# Patient Record
Sex: FEMALE | Race: BLACK OR AFRICAN AMERICAN | NOT HISPANIC OR LATINO | Employment: OTHER | URBAN - NONMETROPOLITAN AREA
[De-identification: names, ages, dates, MRNs, and addresses within clinical notes are randomized per-mention and may not be internally consistent; named-entity substitution may affect disease eponyms.]

---

## 2023-05-05 ENCOUNTER — APPOINTMENT (OUTPATIENT)
Dept: GENERAL RADIOLOGY | Facility: HOSPITAL | Age: 73
End: 2023-05-05
Payer: MEDICARE

## 2023-05-05 ENCOUNTER — HOSPITAL ENCOUNTER (INPATIENT)
Facility: HOSPITAL | Age: 73
LOS: 11 days | Discharge: NURSING, REHAB OR LONG-TERM CARE | End: 2023-05-16
Attending: INTERNAL MEDICINE | Admitting: INTERNAL MEDICINE
Payer: MEDICARE

## 2023-05-05 DIAGNOSIS — Z74.09 IMPAIRED MOBILITY: ICD-10-CM

## 2023-05-05 DIAGNOSIS — R57.1 HYPOVOLEMIC SHOCK: ICD-10-CM

## 2023-05-05 DIAGNOSIS — D62 ACUTE BLOOD LOSS ANEMIA: Primary | ICD-10-CM

## 2023-05-05 DIAGNOSIS — Z78.9 IMPAIRED MOBILITY AND ADLS: ICD-10-CM

## 2023-05-05 DIAGNOSIS — Z74.09 IMPAIRED MOBILITY AND ADLS: ICD-10-CM

## 2023-05-05 DIAGNOSIS — K25.0 ACUTE GASTRIC ULCER WITH HEMORRHAGE: ICD-10-CM

## 2023-05-05 DIAGNOSIS — R13.11 ORAL PHASE DYSPHAGIA: ICD-10-CM

## 2023-05-05 PROBLEM — R09.02 HYPOXIA: Status: ACTIVE | Noted: 2023-05-05

## 2023-05-05 PROBLEM — J18.9 RIGHT LOWER LOBE PNEUMONIA: Status: ACTIVE | Noted: 2023-05-05

## 2023-05-05 PROBLEM — G93.41 METABOLIC ENCEPHALOPATHY: Status: ACTIVE | Noted: 2023-05-05

## 2023-05-05 PROBLEM — R74.01 TRANSAMINITIS: Status: ACTIVE | Noted: 2023-05-05

## 2023-05-05 PROBLEM — D64.9 ANEMIA: Status: ACTIVE | Noted: 2023-05-05

## 2023-05-05 PROBLEM — R65.21 SEPTIC SHOCK: Status: ACTIVE | Noted: 2023-05-05

## 2023-05-05 PROBLEM — F79 INTELLECTUAL DISABILITY: Status: ACTIVE | Noted: 2023-05-05

## 2023-05-05 PROBLEM — D69.6 THROMBOCYTOPENIA: Status: ACTIVE | Noted: 2023-05-05

## 2023-05-05 PROBLEM — A41.9 SEPTIC SHOCK: Status: ACTIVE | Noted: 2023-05-05

## 2023-05-05 LAB
ALBUMIN SERPL-MCNC: 2.1 G/DL (ref 3.5–5.2)
ALBUMIN/GLOB SERPL: 1.2 G/DL
ALP SERPL-CCNC: 53 U/L (ref 39–117)
ALT SERPL W P-5'-P-CCNC: 54 U/L (ref 1–33)
ANION GAP SERPL CALCULATED.3IONS-SCNC: 5 MMOL/L (ref 5–15)
ARTERIAL PATENCY WRIST A: POSITIVE
AST SERPL-CCNC: 39 U/L (ref 1–32)
ATMOSPHERIC PRESS: 751 MMHG
BASE EXCESS BLDA CALC-SCNC: -0.9 MMOL/L (ref 0–2)
BASOPHILS # BLD MANUAL: 0.11 10*3/MM3 (ref 0–0.2)
BASOPHILS NFR BLD MANUAL: 1.6 % (ref 0–1.5)
BDY SITE: ABNORMAL
BILIRUB SERPL-MCNC: 0.3 MG/DL (ref 0–1.2)
BODY TEMPERATURE: 37 C
BUN SERPL-MCNC: 56 MG/DL (ref 8–23)
BUN/CREAT SERPL: 67.5 (ref 7–25)
CALCIUM SPEC-SCNC: 7.8 MG/DL (ref 8.6–10.5)
CHLORIDE SERPL-SCNC: 114 MMOL/L (ref 98–107)
CO2 SERPL-SCNC: 25 MMOL/L (ref 22–29)
CREAT SERPL-MCNC: 0.83 MG/DL (ref 0.57–1)
D-LACTATE SERPL-SCNC: 1.8 MMOL/L (ref 0.5–2)
DEPRECATED RDW RBC AUTO: 45 FL (ref 37–54)
EGFRCR SERPLBLD CKD-EPI 2021: 74.5 ML/MIN/1.73
ERYTHROCYTE [DISTWIDTH] IN BLOOD BY AUTOMATED COUNT: 15 % (ref 12.3–15.4)
GAS FLOW AIRWAY: 2 LPM
GIANT PLATELETS: ABNORMAL
GLOBULIN UR ELPH-MCNC: 1.8 GM/DL
GLUCOSE SERPL-MCNC: 145 MG/DL (ref 65–99)
HCO3 BLDA-SCNC: 23.3 MMOL/L (ref 20–26)
HCT VFR BLD AUTO: 19.4 % (ref 34–46.6)
HGB BLD-MCNC: 6.7 G/DL (ref 12–15.9)
L PNEUMO1 AG UR QL IA: NEGATIVE
LYMPHOCYTES # BLD MANUAL: 1.65 10*3/MM3 (ref 0.7–3.1)
LYMPHOCYTES NFR BLD MANUAL: 2.4 % (ref 5–12)
Lab: ABNORMAL
MAGNESIUM SERPL-MCNC: 1.7 MG/DL (ref 1.6–2.4)
MCH RBC QN AUTO: 28.9 PG (ref 26.6–33)
MCHC RBC AUTO-ENTMCNC: 34.5 G/DL (ref 31.5–35.7)
MCV RBC AUTO: 83.6 FL (ref 79–97)
MODALITY: ABNORMAL
MONOCYTES # BLD: 0.17 10*3/MM3 (ref 0.1–0.9)
MRSA DNA SPEC QL NAA+PROBE: NORMAL
NEUTROPHILS # BLD AUTO: 5.11 10*3/MM3 (ref 1.7–7)
NEUTROPHILS NFR BLD MANUAL: 72.6 % (ref 42.7–76)
NRBC SPEC MANUAL: 7.3 /100 WBC (ref 0–0.2)
PCO2 BLDA: 35.1 MM HG (ref 35–45)
PCO2 TEMP ADJ BLD: 35.1 MM HG (ref 35–45)
PH BLDA: 7.43 PH UNITS (ref 7.35–7.45)
PH, TEMP CORRECTED: 7.43 PH UNITS (ref 7.35–7.45)
PHOSPHATE SERPL-MCNC: 2.1 MG/DL (ref 2.5–4.5)
PLATELET # BLD AUTO: 80 10*3/MM3 (ref 140–450)
PMV BLD AUTO: 12.4 FL (ref 6–12)
PO2 BLDA: 92.1 MM HG (ref 83–108)
PO2 TEMP ADJ BLD: 92.1 MM HG (ref 83–108)
POIKILOCYTOSIS BLD QL SMEAR: ABNORMAL
POTASSIUM SERPL-SCNC: 3.9 MMOL/L (ref 3.5–5.2)
PROCALCITONIN SERPL-MCNC: 0.27 NG/ML (ref 0–0.25)
PROT SERPL-MCNC: 3.9 G/DL (ref 6–8.5)
RBC # BLD AUTO: 2.32 10*6/MM3 (ref 3.77–5.28)
S PNEUM AG SPEC QL LA: NEGATIVE
SAO2 % BLDCOA: 98.4 % (ref 94–99)
SMALL PLATELETS BLD QL SMEAR: ABNORMAL
SODIUM SERPL-SCNC: 144 MMOL/L (ref 136–145)
TARGETS BLD QL SMEAR: ABNORMAL
VARIANT LYMPHS NFR BLD MANUAL: 23.4 % (ref 19.6–45.3)
VENTILATOR MODE: ABNORMAL
WBC MORPH BLD: NORMAL
WBC NRBC COR # BLD: 7.04 10*3/MM3 (ref 3.4–10.8)

## 2023-05-05 PROCEDURE — 86901 BLOOD TYPING SEROLOGIC RH(D): CPT | Performed by: INTERNAL MEDICINE

## 2023-05-05 PROCEDURE — P9016 RBC LEUKOCYTES REDUCED: HCPCS

## 2023-05-05 PROCEDURE — 86900 BLOOD TYPING SEROLOGIC ABO: CPT | Performed by: INTERNAL MEDICINE

## 2023-05-05 PROCEDURE — 85025 COMPLETE CBC W/AUTO DIFF WBC: CPT | Performed by: INTERNAL MEDICINE

## 2023-05-05 PROCEDURE — 86850 RBC ANTIBODY SCREEN: CPT | Performed by: INTERNAL MEDICINE

## 2023-05-05 PROCEDURE — 25010000002 PIPERACILLIN SOD-TAZOBACTAM PER 1 G: Performed by: INTERNAL MEDICINE

## 2023-05-05 PROCEDURE — 36430 TRANSFUSION BLD/BLD COMPNT: CPT

## 2023-05-05 PROCEDURE — 82803 BLOOD GASES ANY COMBINATION: CPT

## 2023-05-05 PROCEDURE — 83735 ASSAY OF MAGNESIUM: CPT | Performed by: INTERNAL MEDICINE

## 2023-05-05 PROCEDURE — 87899 AGENT NOS ASSAY W/OPTIC: CPT | Performed by: INTERNAL MEDICINE

## 2023-05-05 PROCEDURE — 85007 BL SMEAR W/DIFF WBC COUNT: CPT | Performed by: INTERNAL MEDICINE

## 2023-05-05 PROCEDURE — 36600 WITHDRAWAL OF ARTERIAL BLOOD: CPT

## 2023-05-05 PROCEDURE — 94799 UNLISTED PULMONARY SVC/PX: CPT

## 2023-05-05 PROCEDURE — 93010 ELECTROCARDIOGRAM REPORT: CPT | Performed by: EMERGENCY MEDICINE

## 2023-05-05 PROCEDURE — 87641 MR-STAPH DNA AMP PROBE: CPT | Performed by: INTERNAL MEDICINE

## 2023-05-05 PROCEDURE — 86900 BLOOD TYPING SEROLOGIC ABO: CPT

## 2023-05-05 PROCEDURE — 80053 COMPREHEN METABOLIC PANEL: CPT | Performed by: INTERNAL MEDICINE

## 2023-05-05 PROCEDURE — 84145 PROCALCITONIN (PCT): CPT | Performed by: INTERNAL MEDICINE

## 2023-05-05 PROCEDURE — 87040 BLOOD CULTURE FOR BACTERIA: CPT | Performed by: INTERNAL MEDICINE

## 2023-05-05 PROCEDURE — 93005 ELECTROCARDIOGRAM TRACING: CPT | Performed by: INTERNAL MEDICINE

## 2023-05-05 PROCEDURE — 94761 N-INVAS EAR/PLS OXIMETRY MLT: CPT

## 2023-05-05 PROCEDURE — 87449 NOS EACH ORGANISM AG IA: CPT | Performed by: INTERNAL MEDICINE

## 2023-05-05 PROCEDURE — 86920 COMPATIBILITY TEST SPIN: CPT

## 2023-05-05 PROCEDURE — 84100 ASSAY OF PHOSPHORUS: CPT | Performed by: INTERNAL MEDICINE

## 2023-05-05 PROCEDURE — 94640 AIRWAY INHALATION TREATMENT: CPT

## 2023-05-05 PROCEDURE — 94664 DEMO&/EVAL PT USE INHALER: CPT

## 2023-05-05 PROCEDURE — 86901 BLOOD TYPING SEROLOGIC RH(D): CPT

## 2023-05-05 PROCEDURE — 83605 ASSAY OF LACTIC ACID: CPT | Performed by: INTERNAL MEDICINE

## 2023-05-05 PROCEDURE — 71045 X-RAY EXAM CHEST 1 VIEW: CPT

## 2023-05-05 PROCEDURE — 25010000002 VANCOMYCIN 10 G RECONSTITUTED SOLUTION: Performed by: INTERNAL MEDICINE

## 2023-05-05 RX ORDER — IPRATROPIUM BROMIDE AND ALBUTEROL SULFATE 2.5; .5 MG/3ML; MG/3ML
3 SOLUTION RESPIRATORY (INHALATION)
Status: DISCONTINUED | OUTPATIENT
Start: 2023-05-05 | End: 2023-05-06

## 2023-05-05 RX ORDER — ACETAMINOPHEN 325 MG/1
650 TABLET ORAL EVERY 4 HOURS PRN
Status: DISCONTINUED | OUTPATIENT
Start: 2023-05-05 | End: 2023-05-16 | Stop reason: HOSPADM

## 2023-05-05 RX ORDER — SODIUM CHLORIDE 0.9 % (FLUSH) 0.9 %
10 SYRINGE (ML) INJECTION EVERY 12 HOURS SCHEDULED
Status: DISCONTINUED | OUTPATIENT
Start: 2023-05-05 | End: 2023-05-16 | Stop reason: HOSPADM

## 2023-05-05 RX ORDER — SODIUM CHLORIDE 9 MG/ML
40 INJECTION, SOLUTION INTRAVENOUS AS NEEDED
Status: DISCONTINUED | OUTPATIENT
Start: 2023-05-05 | End: 2023-05-16 | Stop reason: HOSPADM

## 2023-05-05 RX ORDER — ACETAMINOPHEN 650 MG/1
650 SUPPOSITORY RECTAL EVERY 4 HOURS PRN
Status: DISCONTINUED | OUTPATIENT
Start: 2023-05-05 | End: 2023-05-16 | Stop reason: HOSPADM

## 2023-05-05 RX ORDER — ONDANSETRON 2 MG/ML
4 INJECTION INTRAMUSCULAR; INTRAVENOUS EVERY 6 HOURS PRN
Status: DISCONTINUED | OUTPATIENT
Start: 2023-05-05 | End: 2023-05-16 | Stop reason: HOSPADM

## 2023-05-05 RX ORDER — SODIUM CHLORIDE 9 MG/ML
100 INJECTION, SOLUTION INTRAVENOUS CONTINUOUS
Status: DISCONTINUED | OUTPATIENT
Start: 2023-05-05 | End: 2023-05-06

## 2023-05-05 RX ORDER — NOREPINEPHRINE BITARTRATE 0.03 MG/ML
.02-.3 INJECTION, SOLUTION INTRAVENOUS
Status: DISCONTINUED | OUTPATIENT
Start: 2023-05-05 | End: 2023-05-07

## 2023-05-05 RX ORDER — SODIUM CHLORIDE 0.9 % (FLUSH) 0.9 %
10 SYRINGE (ML) INJECTION AS NEEDED
Status: DISCONTINUED | OUTPATIENT
Start: 2023-05-05 | End: 2023-05-16 | Stop reason: HOSPADM

## 2023-05-05 RX ORDER — ACETAMINOPHEN 160 MG/5ML
650 SOLUTION ORAL EVERY 4 HOURS PRN
Status: DISCONTINUED | OUTPATIENT
Start: 2023-05-05 | End: 2023-05-16 | Stop reason: HOSPADM

## 2023-05-05 RX ADMIN — VANCOMYCIN HYDROCHLORIDE 1250 MG: 10 INJECTION, POWDER, LYOPHILIZED, FOR SOLUTION INTRAVENOUS at 22:05

## 2023-05-05 RX ADMIN — SODIUM CHLORIDE, POTASSIUM CHLORIDE, SODIUM LACTATE AND CALCIUM CHLORIDE 1000 ML: 600; 310; 30; 20 INJECTION, SOLUTION INTRAVENOUS at 20:09

## 2023-05-05 RX ADMIN — TAZOBACTAM SODIUM AND PIPERACILLIN SODIUM 4.5 G: 500; 4 INJECTION, SOLUTION INTRAVENOUS at 21:38

## 2023-05-05 RX ADMIN — IPRATROPIUM BROMIDE AND ALBUTEROL SULFATE 3 ML: .5; 3 SOLUTION RESPIRATORY (INHALATION) at 20:39

## 2023-05-05 RX ADMIN — Medication 0.27 MCG/KG/MIN: at 20:10

## 2023-05-05 RX ADMIN — SODIUM CHLORIDE 100 ML/HR: 9 INJECTION, SOLUTION INTRAVENOUS at 20:09

## 2023-05-05 RX ADMIN — Medication 0.3 MCG/KG/MIN: at 18:31

## 2023-05-05 RX ADMIN — Medication 10 ML: at 20:10

## 2023-05-05 NOTE — H&P
[facility] Medicine Services  HISTORY AND PHYSICAL    Date of Admission: 5/5/2023  Primary Care Physician: David Frazier MD    Subjective   Primary Historian: Chart.    Chief Complaint: Transferred for hypotension.    History of Present Illness  This is a 72-year-old -American female patient who lives at a group home in [place], [place].  She is a snow of the [facility] in [place].  She has had a prior stroke and has a cognitive deficit according to the transferring physician.  Her baseline is unclear to me.  She arrives unable to provide any history.  She is fairly somnolent and does not speak.    She was admitted at the transferring facility on 5/3.  She comes from [facility] in [facility].  Her primary care doctor, Dr. Frazier, had been caring for her.  He states that when he admitted her she was hypothermic and had altered mental status.  He searched for signs of infection and found none.  He felt that she was dehydrated.  She was warmed and placed on fluids.  She has not improved very much.  She started requiring some oxygen earlier today after she had not been.  He repeated a chest x-ray and saw a right lower lobe pneumonia.  She had also been having blood pressures in the 80s so he placed a central line and started her on norepinephrine.  They do not have an intensive care unit so he requested that she come here.    Review of Systems   Unable to perform ROS: Mental status change      Past Medical History:   Past Medical History:   Diagnosis Date   • Hypertension    • Intellectual disability    • Psychosis    • Right hemiparesis    • Stroke       Past Surgical History:  Past Surgical History:   Procedure Laterality Date   • HIP SURGERY Right 2019     Social History: Alcohol use questions deferred to the physician. Drug use questions deferred to the physician.    Family History: Family history is unknown by patient.       Allergies:  No Known  Allergies    Medications:  It appears that her outpatient medications were acetaminophen, amlodipine, aspirin, atorvastatin, carvedilol, loratadine, Risperdal, Dyazide, calcium and vitamin D, PreserVision, and Senokot.    In the hospital she had been receiving albuterol, pantoprazole, Lovenox, acetaminophen, and vancomycin/Zosyn.    I have utilized all available immediate resources to obtain, update, or review the patient's current medications (including all prescriptions, over-the-counter products, herbals, cannabis/cannabidiol products, and vitamin/mineral/dietary (nutritional) supplements).    Objective     Vital Signs: BP 99/68   Pulse 88   Temp 98.5 °F (36.9 °C) (Axillary)   Resp 12   Wt 57 kg (125 lb 10.6 oz)   SpO2 93%   Physical Exam  Constitutional:       Comments: In bed.   HENT:      Head: Normocephalic and atraumatic.      Mouth/Throat:      Mouth: Mucous membranes are dry.   Eyes:      Conjunctiva/sclera: Conjunctivae normal.      Pupils: Pupils are equal, round, and reactive to light.   Neck:      Vascular: No JVD.      Comments: Right IJ CVC placed at the transferring facility.  We will get a chest x-ray to confirm placement.  Cardiovascular:      Rate and Rhythm: Normal rate and regular rhythm.      Heart sounds: Normal heart sounds.   Pulmonary:      Breath sounds: Rhonchi and rales present. No wheezing.      Comments: On 3 L of oxygen.  Slightly tachypneic at times.  Chest:      Chest wall: No tenderness.   Abdominal:      General: Bowel sounds are normal. There is no distension.      Palpations: Abdomen is soft.      Tenderness: There is no abdominal tenderness.   Musculoskeletal:         General: No tenderness or deformity. Normal range of motion.      Cervical back: Neck supple.   Skin:     General: Skin is warm and dry.      Findings: No rash.   Neurological:      Mental Status: She is disoriented.      Motor: Weakness present. No abnormal muscle tone.      Comments: Seems to be globally  weak at present, but also record stated she has more chronic right hemiparesis after her stroke.  She responded to painful stimuli in the lower extremities, but not well in the upper extremities.   Psychiatric:      Comments: Fairly somnolent.        Results Reviewed from [facility]:  1.  ABG on 5/3 showed a pH of 7.316, PCO2 65, PaO2 555.  Sat of 100% on room air.  2.  Chemistries from 5/5 show a sodium of 147, potassium 4.1.  Chloride 115 and bicarb 29.  BUN and creatinine 51 and 0.96 respectively.  Calcium 7.9 and glucose 126.  Hepatic function panel shows an AST of 54, ALT 85.  Alkaline phosphatase 62 with a total bilirubin of 0.27.  Total protein 4.0 and albumin 1.4.  3.  Magnesium on 5/3 was 2.1.  4.  BNP slightly elevated today at 322.  5.  High-sensitivity troponin on 5/3 was normal at 15 and was repeated today at 52.  Upper limit of normal is 51.  6.  CBC from today shows white blood cell count of 4.8.  H&H 8.3 and 23.9 respectively.  Platelets 86,000.  7.  COVID-19 testing negative.  8.  Urinalysis showed 2+ bacteria with no pyuria and negative nitrites with trace leukocyte esterase.  9.  EKG from today at 1117 showed sinus rhythm without ST or T wave changes suggestive of ischemia.  10.  She had a recent CT of the head without contrast and a chest x-ray.  The transferring physician stated that her initial chest x-ray failed to show any problems.  He told me on the transfer line that the chest x-ray from today showed a developing right lower lobe pneumonia.  Recent CT of the head was apparently unremarkable.  No reads were sent.      I have personally reviewed and interpreted the radiology studies and ECG obtained at time of admission.     Assessment / Plan   Assessment:   Active Hospital Problems    Diagnosis    • **Septic shock    • Right lower lobe pneumonia    • Hypoxia    • Metabolic encephalopathy    • Anemia    • Thrombocytopenia    • Intellectual disability    • Transaminitis       Treatment Plan  The patient will be admitted to my service here at [facility].  She is transferred here from [facility] in [facility].  Her primary care provider had her in the hospital there for the last 2 days.  She presented with hypothermia and altered mental status.  He thought at first she was just dehydrated and then she ultimately developed a right lower lobe pneumonia and some hypoxemia after initially having clear chest imaging and a normal urinalysis.  She became hypotensive.  A central line was placed and she was started on norepinephrine and sent here.    Check lactate and procalcitonin.  MRSA nasal screen.  Streptococcal/Legionella urinary antigens. Highly unlikely to produce sputum.    Dose vancomycin and Zosyn.    Make efforts at pulmonary toilet.  DuoNeb.    Bolus fluids and check CVP.  Try to wean off norepinephrine.    Speech therapy to evaluate her if she becomes more awake.    She may be chronically thrombocytopenic and anemic.  Unclear if she is.  Also has a transaminitis.  These issues may need to be looked into further soon.    SCDs for DVT prophylaxis.    Medical Decision Making  Number and Complexity of problems: 1 acute, highly complex problem in the form of her septic shock caused by a presumed right lower lobe pneumonia that was checked out by the transferring provider.  Differential Diagnosis: CHF.    Conditions and Status        Condition is worsening.     [facility] Data  External documents reviewed: Reviewed records from [facility].   Cardiac tracing (EKG, telemetry) interpretation: NSR.   Radiology interpretation: Was told by the transferring facility that she had a right lower lobe pneumonia.  I was told the CT of the head was unremarkable.  Labs reviewed: As above.  Any tests that were considered but not ordered: CT of the chest.     Decision rules/scores evaluated (example [id number], Wells, etc): None considered at present.     Discussed with: The  transferring physician as above.  Discussed with her nurse (Chantel) here.     Care Planning  Shared decision making: Unable to consent to anything.  Her transfer was approved by her state guardian.  Code status and discussions: Full with full interventions according to her state guardian.  Her primary care provider discussed her case with them prior to transfer.    Disposition  Social Determinants of Health that impact treatment or disposition: Lives in a group home with an intellectual disability.  [place] of the [facility].  Estimated length of stay is 3-5 days.     I confirmed that the patient's advanced care plan is present, code status is documented, and a surrogate decision maker is listed in the patient's medical record.     The patient's surrogate decision maker is her state guardian in [place].     The patient was seen and examined by me on 05/05/23 at 1745.    Electronically signed by David Laird DO, 05/05/23, 19:13 CDT.    I spent 35 minutes providing critical care management to this patient. This excludes time spent in performing separately billed procedures.

## 2023-05-06 ENCOUNTER — ANESTHESIA EVENT (OUTPATIENT)
Dept: PERIOP | Facility: HOSPITAL | Age: 73
End: 2023-05-06
Payer: MEDICARE

## 2023-05-06 ENCOUNTER — ANESTHESIA (OUTPATIENT)
Dept: PERIOP | Facility: HOSPITAL | Age: 73
End: 2023-05-06
Payer: MEDICARE

## 2023-05-06 LAB
ABO GROUP BLD: NORMAL
ANION GAP SERPL CALCULATED.3IONS-SCNC: 8 MMOL/L (ref 5–15)
APTT PPP: 39 SECONDS (ref 24.1–35)
BACTERIA UR QL AUTO: ABNORMAL /HPF
BILIRUB UR QL STRIP: NEGATIVE
BLD GP AB SCN SERPL QL: NEGATIVE
BUN SERPL-MCNC: 52 MG/DL (ref 8–23)
BUN/CREAT SERPL: 83.9 (ref 7–25)
CALCIUM SPEC-SCNC: 7.5 MG/DL (ref 8.6–10.5)
CHLORIDE SERPL-SCNC: 120 MMOL/L (ref 98–107)
CLARITY UR: CLEAR
CO2 SERPL-SCNC: 19 MMOL/L (ref 22–29)
COLOR UR: YELLOW
CREAT SERPL-MCNC: 0.62 MG/DL (ref 0.57–1)
DEPRECATED RDW RBC AUTO: 43.7 FL (ref 37–54)
EGFRCR SERPLBLD CKD-EPI 2021: 94.2 ML/MIN/1.73
ERYTHROCYTE [DISTWIDTH] IN BLOOD BY AUTOMATED COUNT: 13.9 % (ref 12.3–15.4)
GLUCOSE BLDC GLUCOMTR-MCNC: 139 MG/DL (ref 70–130)
GLUCOSE SERPL-MCNC: 156 MG/DL (ref 65–99)
GLUCOSE UR STRIP-MCNC: NEGATIVE MG/DL
HCT VFR BLD AUTO: 23.4 % (ref 34–46.6)
HCT VFR BLD AUTO: 27.1 % (ref 34–46.6)
HCT VFR BLD AUTO: 27.6 % (ref 34–46.6)
HCT VFR BLD AUTO: 30.4 % (ref 34–46.6)
HGB BLD-MCNC: 10 G/DL (ref 12–15.9)
HGB BLD-MCNC: 7.8 G/DL (ref 12–15.9)
HGB BLD-MCNC: 9.2 G/DL (ref 12–15.9)
HGB BLD-MCNC: 9.3 G/DL (ref 12–15.9)
HGB UR QL STRIP.AUTO: ABNORMAL
HYALINE CASTS UR QL AUTO: ABNORMAL /LPF
INR PPP: 1.38 (ref 0.91–1.09)
KETONES UR QL STRIP: NEGATIVE
LEUKOCYTE ESTERASE UR QL STRIP.AUTO: NEGATIVE
MCH RBC QN AUTO: 28.5 PG (ref 26.6–33)
MCHC RBC AUTO-ENTMCNC: 32.9 G/DL (ref 31.5–35.7)
MCV RBC AUTO: 86.6 FL (ref 79–97)
NITRITE UR QL STRIP: NEGATIVE
PH UR STRIP.AUTO: <=5 [PH] (ref 5–8)
PLATELET # BLD AUTO: 59 10*3/MM3 (ref 140–450)
PMV BLD AUTO: 13.4 FL (ref 6–12)
POTASSIUM SERPL-SCNC: 3.6 MMOL/L (ref 3.5–5.2)
PROT UR QL STRIP: NEGATIVE
PROTHROMBIN TIME: 17.2 SECONDS (ref 11.8–14.8)
RBC # BLD AUTO: 3.51 10*6/MM3 (ref 3.77–5.28)
RBC # UR STRIP: ABNORMAL /HPF
REF LAB TEST METHOD: ABNORMAL
RH BLD: POSITIVE
SODIUM SERPL-SCNC: 147 MMOL/L (ref 136–145)
SP GR UR STRIP: 1.02 (ref 1–1.03)
SQUAMOUS #/AREA URNS HPF: ABNORMAL /HPF
T&S EXPIRATION DATE: NORMAL
UROBILINOGEN UR QL STRIP: ABNORMAL
WBC # UR STRIP: ABNORMAL /HPF
WBC NRBC COR # BLD: 8.66 10*3/MM3 (ref 3.4–10.8)

## 2023-05-06 PROCEDURE — 36430 TRANSFUSION BLD/BLD COMPNT: CPT

## 2023-05-06 PROCEDURE — 94799 UNLISTED PULMONARY SVC/PX: CPT

## 2023-05-06 PROCEDURE — 43255 EGD CONTROL BLEEDING ANY: CPT | Performed by: INTERNAL MEDICINE

## 2023-05-06 PROCEDURE — 25010000002 ONDANSETRON PER 1 MG: Performed by: NURSE ANESTHETIST, CERTIFIED REGISTERED

## 2023-05-06 PROCEDURE — P9100 PATHOGEN TEST FOR PLATELETS: HCPCS

## 2023-05-06 PROCEDURE — P9037 PLATE PHERES LEUKOREDU IRRAD: HCPCS

## 2023-05-06 PROCEDURE — 0W3P8ZZ CONTROL BLEEDING IN GASTROINTESTINAL TRACT, VIA NATURAL OR ARTIFICIAL OPENING ENDOSCOPIC: ICD-10-PCS | Performed by: INTERNAL MEDICINE

## 2023-05-06 PROCEDURE — 25010000002 SUGAMMADEX 200 MG/2ML SOLUTION: Performed by: NURSE ANESTHETIST, CERTIFIED REGISTERED

## 2023-05-06 PROCEDURE — 85027 COMPLETE CBC AUTOMATED: CPT | Performed by: INTERNAL MEDICINE

## 2023-05-06 PROCEDURE — P9040 RBC LEUKOREDUCED IRRADIATED: HCPCS

## 2023-05-06 PROCEDURE — 25010000002 DESMOPRESSIN PER 1 MCG: Performed by: INTERNAL MEDICINE

## 2023-05-06 PROCEDURE — 85730 THROMBOPLASTIN TIME PARTIAL: CPT | Performed by: FAMILY MEDICINE

## 2023-05-06 PROCEDURE — 25010000002 PIPERACILLIN SOD-TAZOBACTAM PER 1 G: Performed by: INTERNAL MEDICINE

## 2023-05-06 PROCEDURE — 25010000002 EPINEPHRINE 1 MG/10ML SOLUTION PREFILLED SYRINGE: Performed by: INTERNAL MEDICINE

## 2023-05-06 PROCEDURE — 80048 BASIC METABOLIC PNL TOTAL CA: CPT | Performed by: INTERNAL MEDICINE

## 2023-05-06 PROCEDURE — 85018 HEMOGLOBIN: CPT | Performed by: INTERNAL MEDICINE

## 2023-05-06 PROCEDURE — 81001 URINALYSIS AUTO W/SCOPE: CPT | Performed by: FAMILY MEDICINE

## 2023-05-06 PROCEDURE — 82948 REAGENT STRIP/BLOOD GLUCOSE: CPT

## 2023-05-06 PROCEDURE — P9016 RBC LEUKOCYTES REDUCED: HCPCS

## 2023-05-06 PROCEDURE — 85018 HEMOGLOBIN: CPT | Performed by: FAMILY MEDICINE

## 2023-05-06 PROCEDURE — 85014 HEMATOCRIT: CPT | Performed by: FAMILY MEDICINE

## 2023-05-06 PROCEDURE — P9035 PLATELET PHERES LEUKOREDUCED: HCPCS

## 2023-05-06 PROCEDURE — 25010000002 PROPOFOL 10 MG/ML EMULSION: Performed by: NURSE ANESTHETIST, CERTIFIED REGISTERED

## 2023-05-06 PROCEDURE — 85610 PROTHROMBIN TIME: CPT | Performed by: FAMILY MEDICINE

## 2023-05-06 PROCEDURE — 86900 BLOOD TYPING SEROLOGIC ABO: CPT

## 2023-05-06 PROCEDURE — 85014 HEMATOCRIT: CPT | Performed by: INTERNAL MEDICINE

## 2023-05-06 DEVICE — DEV CLIP ENDO RESOLUTION360 CONTRL ROT 235CM: Type: IMPLANTABLE DEVICE | Site: STOMACH | Status: FUNCTIONAL

## 2023-05-06 DEVICE — DEV CLIP HEMO RESOLUTION360/ULTR 235CM 17MM STRL: Type: IMPLANTABLE DEVICE | Site: STOMACH | Status: FUNCTIONAL

## 2023-05-06 RX ORDER — TRAZODONE HYDROCHLORIDE 50 MG/1
25 TABLET ORAL NIGHTLY
COMMUNITY

## 2023-05-06 RX ORDER — ASPIRIN 81 MG/1
81 TABLET, CHEWABLE ORAL DAILY
COMMUNITY

## 2023-05-06 RX ORDER — IPRATROPIUM BROMIDE AND ALBUTEROL SULFATE 2.5; .5 MG/3ML; MG/3ML
3 SOLUTION RESPIRATORY (INHALATION) EVERY 6 HOURS PRN
Status: DISCONTINUED | OUTPATIENT
Start: 2023-05-06 | End: 2023-05-16 | Stop reason: HOSPADM

## 2023-05-06 RX ORDER — ATORVASTATIN CALCIUM 10 MG/1
10 TABLET, FILM COATED ORAL NIGHTLY
COMMUNITY

## 2023-05-06 RX ORDER — EPINEPHRINE IN SOD CHLOR,ISO 1 MG/10 ML
SYRINGE (ML) INTRAVENOUS AS NEEDED
Status: DISCONTINUED | OUTPATIENT
Start: 2023-05-06 | End: 2023-05-06 | Stop reason: HOSPADM

## 2023-05-06 RX ORDER — RISPERIDONE 0.5 MG/1
0.5 TABLET ORAL DAILY
COMMUNITY
End: 2023-05-16 | Stop reason: HOSPADM

## 2023-05-06 RX ORDER — LORATADINE 10 MG/1
10 CAPSULE, LIQUID FILLED ORAL DAILY PRN
COMMUNITY

## 2023-05-06 RX ORDER — PHENOL 1.4 %
600 AEROSOL, SPRAY (ML) MUCOUS MEMBRANE 2 TIMES DAILY WITH MEALS
Status: ON HOLD | COMMUNITY
End: 2023-05-06

## 2023-05-06 RX ORDER — CARVEDILOL 12.5 MG/1
12.5 TABLET ORAL 2 TIMES DAILY WITH MEALS
COMMUNITY

## 2023-05-06 RX ORDER — PROPOFOL 10 MG/ML
VIAL (ML) INTRAVENOUS AS NEEDED
Status: DISCONTINUED | OUTPATIENT
Start: 2023-05-06 | End: 2023-05-06 | Stop reason: SURG

## 2023-05-06 RX ORDER — ROCURONIUM BROMIDE 10 MG/ML
INJECTION, SOLUTION INTRAVENOUS AS NEEDED
Status: DISCONTINUED | OUTPATIENT
Start: 2023-05-06 | End: 2023-05-06 | Stop reason: SURG

## 2023-05-06 RX ORDER — RISPERIDONE 1 MG/1
1 TABLET ORAL 2 TIMES DAILY
COMMUNITY

## 2023-05-06 RX ORDER — LIDOCAINE HYDROCHLORIDE 20 MG/ML
INJECTION, SOLUTION EPIDURAL; INFILTRATION; INTRACAUDAL; PERINEURAL AS NEEDED
Status: DISCONTINUED | OUTPATIENT
Start: 2023-05-06 | End: 2023-05-06 | Stop reason: SURG

## 2023-05-06 RX ORDER — ANTIOX #8/OM3/DHA/EPA/LUT/ZEAX 250-2.5 MG
1 CAPSULE ORAL 2 TIMES DAILY
COMMUNITY

## 2023-05-06 RX ORDER — ONDANSETRON 2 MG/ML
INJECTION INTRAMUSCULAR; INTRAVENOUS AS NEEDED
Status: DISCONTINUED | OUTPATIENT
Start: 2023-05-06 | End: 2023-05-06 | Stop reason: SURG

## 2023-05-06 RX ORDER — TRIAMTERENE AND HYDROCHLOROTHIAZIDE 37.5; 25 MG/1; MG/1
1 CAPSULE ORAL EVERY MORNING
COMMUNITY
End: 2023-05-16 | Stop reason: HOSPADM

## 2023-05-06 RX ORDER — SODIUM CHLORIDE, SODIUM LACTATE, POTASSIUM CHLORIDE, CALCIUM CHLORIDE 600; 310; 30; 20 MG/100ML; MG/100ML; MG/100ML; MG/100ML
100 INJECTION, SOLUTION INTRAVENOUS CONTINUOUS
Status: DISCONTINUED | OUTPATIENT
Start: 2023-05-06 | End: 2023-05-07

## 2023-05-06 RX ORDER — ACETAMINOPHEN 325 MG/1
650 TABLET ORAL EVERY 6 HOURS PRN
COMMUNITY

## 2023-05-06 RX ORDER — ALBUTEROL SULFATE 2.5 MG/3ML
2.5 SOLUTION RESPIRATORY (INHALATION) EVERY 4 HOURS PRN
COMMUNITY

## 2023-05-06 RX ORDER — BUPIVACAINE HCL/0.9 % NACL/PF 0.125 %
PLASTIC BAG, INJECTION (ML) EPIDURAL AS NEEDED
Status: DISCONTINUED | OUTPATIENT
Start: 2023-05-06 | End: 2023-05-06 | Stop reason: SURG

## 2023-05-06 RX ORDER — AMOXICILLIN 250 MG
2 CAPSULE ORAL NIGHTLY
COMMUNITY

## 2023-05-06 RX ORDER — AMLODIPINE BESYLATE 5 MG/1
5 TABLET ORAL DAILY
COMMUNITY

## 2023-05-06 RX ORDER — POTASSIUM CHLORIDE 20 MEQ/1
20 TABLET, EXTENDED RELEASE ORAL DAILY
COMMUNITY
End: 2023-05-16 | Stop reason: HOSPADM

## 2023-05-06 RX ADMIN — SUGAMMADEX 200 MG: 100 INJECTION, SOLUTION INTRAVENOUS at 14:22

## 2023-05-06 RX ADMIN — PANTOPRAZOLE SODIUM 8 MG/HR: 40 INJECTION, POWDER, FOR SOLUTION INTRAVENOUS at 19:00

## 2023-05-06 RX ADMIN — DESMOPRESSIN ACETATE 18 MCG: 4 SOLUTION INTRAVENOUS at 10:15

## 2023-05-06 RX ADMIN — PROPOFOL 80 MG: 10 INJECTION, EMULSION INTRAVENOUS at 13:57

## 2023-05-06 RX ADMIN — SODIUM CHLORIDE, POTASSIUM CHLORIDE, SODIUM LACTATE AND CALCIUM CHLORIDE 100 ML/HR: 600; 310; 30; 20 INJECTION, SOLUTION INTRAVENOUS at 09:30

## 2023-05-06 RX ADMIN — GLYCOPYRROLATE 0.2 MG: 0.2 INJECTION INTRAMUSCULAR; INTRAVENOUS at 14:01

## 2023-05-06 RX ADMIN — SODIUM CHLORIDE 100 ML/HR: 9 INJECTION, SOLUTION INTRAVENOUS at 03:23

## 2023-05-06 RX ADMIN — IPRATROPIUM BROMIDE AND ALBUTEROL SULFATE 3 ML: .5; 3 SOLUTION RESPIRATORY (INHALATION) at 07:38

## 2023-05-06 RX ADMIN — ONDANSETRON 4 MG: 2 INJECTION INTRAMUSCULAR; INTRAVENOUS at 14:15

## 2023-05-06 RX ADMIN — TAZOBACTAM SODIUM AND PIPERACILLIN SODIUM 4.5 G: 500; 4 INJECTION, SOLUTION INTRAVENOUS at 12:35

## 2023-05-06 RX ADMIN — ROCURONIUM BROMIDE 60 MG: 10 INJECTION, SOLUTION INTRAVENOUS at 13:57

## 2023-05-06 RX ADMIN — PANTOPRAZOLE SODIUM 8 MG/HR: 40 INJECTION, POWDER, FOR SOLUTION INTRAVENOUS at 23:13

## 2023-05-06 RX ADMIN — TAZOBACTAM SODIUM AND PIPERACILLIN SODIUM 4.5 G: 500; 4 INJECTION, SOLUTION INTRAVENOUS at 03:22

## 2023-05-06 RX ADMIN — Medication 0.23 MCG/KG/MIN: at 09:56

## 2023-05-06 RX ADMIN — Medication 100 MCG: at 13:57

## 2023-05-06 RX ADMIN — PANTOPRAZOLE SODIUM 8 MG/HR: 40 INJECTION, POWDER, FOR SOLUTION INTRAVENOUS at 09:30

## 2023-05-06 RX ADMIN — Medication 0.3 MCG/KG/MIN: at 01:03

## 2023-05-06 RX ADMIN — IPRATROPIUM BROMIDE AND ALBUTEROL SULFATE 3 ML: .5; 3 SOLUTION RESPIRATORY (INHALATION) at 10:41

## 2023-05-06 RX ADMIN — Medication 10 ML: at 09:30

## 2023-05-06 RX ADMIN — SODIUM CHLORIDE, POTASSIUM CHLORIDE, SODIUM LACTATE AND CALCIUM CHLORIDE 100 ML/HR: 600; 310; 30; 20 INJECTION, SOLUTION INTRAVENOUS at 17:45

## 2023-05-06 RX ADMIN — Medication 10 ML: at 20:10

## 2023-05-06 RX ADMIN — TAZOBACTAM SODIUM AND PIPERACILLIN SODIUM 4.5 G: 500; 4 INJECTION, SOLUTION INTRAVENOUS at 19:57

## 2023-05-06 RX ADMIN — LIDOCAINE HYDROCHLORIDE 100 MG: 20 INJECTION, SOLUTION EPIDURAL; INFILTRATION; INTRACAUDAL; PERINEURAL at 13:57

## 2023-05-06 NOTE — PROGRESS NOTES
[facility] Medicine Services  INPATIENT PROGRESS NOTE    Patient Name: Jess Ramsey  Date of Admission: 5/5/2023  Today's Date: 05/06/23  Length of Stay: 1  Primary Care Physician: David Frazier MD    Subjective   Chief Complaint: Altered mental status/pneumonia.    HPI   Patient is currently nonverbal, moaning and screaming.  Patient's is more alert than yesterday per nursing staff.  Blood pressure is improving, continue to wean off Levophed drip for now.  Ongoing treatment of pneumonia.    Review of Systems   Unable to obtain due to altered mental status.  All pertinent negatives and positives are as above. All other systems have been reviewed and are negative unless otherwise stated.     Objective    Temp:  [96.9 °F (36.1 °C)-98.5 °F (36.9 °C)] 97.9 °F (36.6 °C)  Heart Rate:  [76-99] 93  Resp:  [12-22] 20  BP: ()/() 91/63  Physical Exam  Vitals and nursing note reviewed.   Constitutional:       Comments: Chronically ill.   HENT:      Head: Normocephalic.   Eyes:      Conjunctiva/sclera: Conjunctivae normal.      Pupils: Pupils are equal, round, and reactive to light.   Neck:      Vascular: No JVD.   Cardiovascular:      Rate and Rhythm: Normal rate and regular rhythm.      Heart sounds: Normal heart sounds.   Pulmonary:      Effort: No respiratory distress.      Breath sounds: Rhonchi present. No wheezing or rales.      Comments: Patient is on 2 L of oxygen, rhonchi bilateral bases.  Chest:      Chest wall: No tenderness.   Abdominal:      General: Bowel sounds are normal. There is no distension.      Palpations: Abdomen is soft.      Tenderness: There is no abdominal tenderness.   Musculoskeletal:         General: No tenderness or deformity.      Cervical back: Neck supple.   Skin:     General: Skin is warm and dry.      Capillary Refill: Capillary refill takes 2 to 3 seconds.      Findings: No rash.   Neurological:      Cranial Nerves: No cranial nerve  deficit.      Motor: Weakness present. No abnormal muscle tone.      Coordination: Coordination abnormal.      Gait: Gait abnormal.      Deep Tendon Reflexes: Reflexes normal.             Results Review:  I have reviewed the labs, radiology results, and diagnostic studies.    Laboratory Data:   Results from last 7 days   Lab Units 05/06/23 0400 05/05/23 2000   WBC 10*3/mm3 8.66 7.04   HEMOGLOBIN g/dL 10.0* 6.7*   HEMATOCRIT % 30.4* 19.4*   PLATELETS 10*3/mm3 59* 80*        Results from last 7 days   Lab Units 05/06/23  0400 05/05/23 2000   SODIUM mmol/L 147* 144   POTASSIUM mmol/L 3.6 3.9   CHLORIDE mmol/L 120* 114*   CO2 mmol/L 19.0* 25.0   BUN mg/dL 52* 56*   CREATININE mg/dL 0.62 0.83   CALCIUM mg/dL 7.5* 7.8*   BILIRUBIN mg/dL  --  0.3   ALK PHOS U/L  --  53   ALT (SGPT) U/L  --  54*   AST (SGOT) U/L  --  39*   GLUCOSE mg/dL 156* 145*       Culture Data:   Blood Culture   Date Value Ref Range Status   05/05/2023 No growth at less than 24 hours  Preliminary       Radiology Data:   Imaging Results (Last 24 Hours)     Procedure Component Value Units Date/Time    XR Chest 1 View [[id number]] Collected: 05/05/23 1959     Updated: 05/05/23 2003    Narrative:      EXAM/TECHNIQUE: XR CHEST 1 VW-     INDICATION: Line placement, pneumonia.     COMPARISON: None     FINDINGS:     Right-sided CVL with tip overlying the cavoatrial junction. No visible  pneumothorax. Hazy veiling RIGHT basilar opacity. LEFT lung and pleural  space are clear. Cardiac silhouette is normal size. No acute osseous  finding.       Impression:         1.  Right-sided CVL with tip overlying the low SVC. No pneumothorax.  2.  Veiling RIGHT basilar opacity, likely representing small layering  pleural effusion and/or atelectasis.  This report was finalized on 05/05/2023 20:00 by Dr. Miguel Angel Grewal MD.          I have reviewed the patient's current medications.     Assessment/Plan   Assessment  Active Hospital Problems    Diagnosis    • **Septic shock     • Right lower lobe pneumonia    • Intellectual disability    • Anemia    • Thrombocytopenia    • Transaminitis    • Hypoxia    • Metabolic encephalopathy        Treatment Plan  HPI . Patient will be admitted at [facility].  She is transferred here from [facility] in [facility].  Her primary care provider had her in the hospital there for the last 2 days.  She presented with hypothermia and altered mental status.  He thought at first she was just dehydrated and then she ultimately developed a right lower lobe pneumonia and some hypoxemia after initially having clear chest imaging and a normal urinalysis.  She became hypotensive.  A central line was placed and she was started on norepinephrine and sent here.    Hypotension.  Wean down Levophed drip.  Normal saline.    GI bleed.  Black tarry stool noted.  GI consult.  Serial hemoglobin and stat hemoglobin now.  Protonix drip.    Anemia.  Status post 2 units of blood transfusion last night.  Hemoglobin 10 this morning.      Thrombocytopenia.  Platelet counts decreasing.  Platelet count today is 59.    Pneumonia/patient arrives somnolent/hypothermia/altered mental status . Zosyn.  Vancomycin.  Chest x-ray-  Right-sided CVL with tip overlying the low SVC,  No pneumothorax,  Veiling RIGHT basilar opacity, likely representing small layering pleural effusion and/or atelectasis.  Patient is currently on 2 L of oxygen    Buttock wound, stage II.  Wound care consult.    Hypernatremia.  Will follow    Dehydration . Ongoing IV hydration    History of stroke and cognitive deficit according to transfer physician.   Patient unable to provide any history.  Chronic right-sided weakness, nonverbal, patient does ambulate at baseline.    Patient from a group home in [facility].     Nutrition . Speech evaluate     Deconditioning . PT and OT consult    Blood culture with SUSHIL- pending.  Legionella antigen-negative.  MRSA screen-negative.  Strep  pneumo-negative.  UA ordered.    Patient transferred from [facility].   Patient is a [facility] of [facility].  [id number]-Tznirmu Virgil emergency contact, ulloa of [facility].    Patient lives in [facility] a [facility].  At baseline patient get around a wheelchair, unable to speak a spoon to feed himself, she can  food and needed, mostly had to be spoon fed.  Behavior wise baseline aggressive and yelling, this has been better with change in medications.    Palliative care consult on Monday    Medical Decision Making  Number and Complexity of problems: Hypotension/pneumonia/GI bleed/anemia/failure to thrive/buttock wound/hyponatremia  Differential Diagnosis: None.     Conditions and Status      Critical .     MDM Data  External documents reviewed: Previous note .  Cardiac tracing (EKG, telemetry) interpretation: Sinus .  Radiology interpretation: X-ray .  Labs reviewed: Laboratory  Any tests that were considered but not ordered: Lab now and in AM.     Decision rules/scores evaluated (example [id number], Wells, etc): None     Discussed with: Nursing and patient.     Care Planning  Shared decision making: Nursing and patient  Code status and discussions: Full code.  [place] of the state    Disposition  Social Determinants of Health that impact treatment or disposition: Patient is from a group home, [place] of the state.  3 to 6 days.    Electronically signed by Gallo Hazel MD, 05/06/23, 08:26 CDT.

## 2023-05-06 NOTE — OP NOTE
ESOPHAGOGASTRODUODENOSCOPY    Date:  5/6/2023    Indications:   1.  Acute blood loss anemia  2.  Melena       Procedure: ESOPHAGOGASTRODUODENOSCOPY/control of bleeding/submucosal tattoo injection    Sedation: As per anesthesia.    Surgeon: Sherif Villegas MD    Anesthesia: Monitored Anesthesia Care     Procedure  Description: After informed consent was obtained, a timeout was called in the endoscopy suite to confirm the correct patient and appropriate procedure.  Thereafter with the patient in the left lateral position, esophagus was intubated under direct vision with adult Olympus gastroscope.  Thereafter scope was slowly advanced into the second portion of the duodenum under direct vision.  Careful examination of the duodenum, stomach and esophagus was performed while slowly withdrawing the scope.  Retroflex examination of the gastric cardia and fundus were also performed.    Findings:   Esophagus: Normal esophageal body mucosa.  Normal Z-line without esophagitis endoscopically.  No Breanne-Wang tear.  No varices.    Stomach:  In the mid to proximal gastric body along the lesser curvature there were 2 subcentimeter fusiform ulcers with overlying eschar and dark adherent clot.  No bleeding to water pump provocation and no obvious significant pathology underlying.  2 large clips placed with 1 on each site with excellent apposition of tissue.  No bleeding.  Adjacent to this there was a large 1.5 cm well-organized fibrinous clot firmly adherent to a small ulcer base.  Approximately 16 mL of 1:10,000 epinephrine injected submucosally with excellent blanching effect in the periphery.  Despite using water pump provocation and a cold snare to gently trim the periphery of the clot, an insufficient amount was removed but not enough to visualize any underlying pathology such as a visible vessel.  The needle sheath was used to displace the clot laterally to visualize the underlying ulcer although again the clot was firmly in  place and no further attempts were made as not to cause any catastrophic bleeding.  2 large clips were placed immediately lateral to the clot at the apices of the small ulcer in an attempt to undermine any feeding vessels.  No resulting bleeding was noted.  No other interventions were performed.  4 mL spot tattoo ink was placed into the deep layers adjacent to the clot should surgical intervention be required in the future. No red blood or active bleeding noted.    Duodenum:  No obvious ulcerations were noted.  Gastric fluid mixed with thin old blood visualized.  No red blood or active bleeding noted.    Complications: No immediate complications.    Recommendations:   1.  Continue pantoprazole IV drip  2.  Serial H&H every 6 hours  3.  Strict n.p.o.  4.  Surgery evaluation in case patient rebleeds.  Should that occur, I would be happy to perform an EGD once again in the operating room in an attempt to control the bleeding endoscopically with surgical backup in case this was unsuccessful.  Interventional radiology will not be available.  5.  Consider relook EGD after 72 hours    Procedure CPT code: 55841    Sherif Villegas MD

## 2023-05-06 NOTE — ANESTHESIA POSTPROCEDURE EVALUATION
"Patient: Jess Ramsey    Procedure Summary     Date: 05/06/23 Room / Location:  [facility] OR 02 /  [facility] OR    Anesthesia Start: 1349 Anesthesia Stop: 1439    Procedure: ESOPHAGOGASTRODUODENOSCOPY WITH ANESTHESIA Diagnosis: (gi bleed)    Surgeons: Sherif Villegas MD Provider: Chandni Aguliar CRNA    Anesthesia Type: general ASA Status: 4 - Emergent          Anesthesia Type: general    Vitals  Vitals Value Taken Time   /105 05/06/23 1451   Temp 97.9 °F (36.6 °C) 05/06/23 1450   Pulse 87 05/06/23 1452   Resp 22 05/06/23 1450   SpO2 94 % 05/06/23 1452   Vitals shown include unvalidated device data.        Post Anesthesia Care and Evaluation    Patient location during evaluation: PACU  Patient participation: complete - patient participated  Level of consciousness: awake and alert  Pain management: adequate    Airway patency: patent  Anesthetic complications: No anesthetic complications    Cardiovascular status: acceptable  Respiratory status: acceptable  Hydration status: acceptable    Comments: Blood pressure 147/85, pulse 88, temperature 97.9 °F (36.6 °C), temperature source Temporal, resp. rate 22, height 154.9 cm (61\"), weight 60 kg (132 lb 4.8 oz), SpO2 96 %.    Pt discharged from PACU based on alma score >8  No anesthesia care post op    "

## 2023-05-06 NOTE — PLAN OF CARE
Patient is more alert this morning. will now open eyes to touch. non verbal at baseline. Afebrile overnight. Continues to be hypotensive. Given I liter bolus, 2 units of PRBC, and remains on a high dose of levophed. Noted HGB was 14.7 on 5/3 in paper chart and has been trending down. 6.7 on 5/5 PM lab work. Up to 10.0 this AM after two units of blood. No obvious signs of bleeding overnight. HR  sinus rhythm. UOP aprrox. 75mls/hr.   Problem: Skin Injury Risk Increased  Goal: Skin Health and Integrity  Outcome: Ongoing, Progressing  Intervention: Optimize Skin Protection  Recent Flowsheet Documentation  Taken 5/6/2023 0411 by Clemente Harris RN  Head of Bed (HOB) Positioning: HOB at 20-30 degrees  Taken 5/6/2023 0150 by Clemente Harris RN  Head of Bed (HOB) Positioning: HOB at 20 degrees  Taken 5/6/2023 0005 by Clemente Harris RN  Head of Bed (HOB) Positioning: HOB at 20 degrees  Taken 5/5/2023 2200 by Clemente Harris RN  Head of Bed (HOB) Positioning: HOB flat  Taken 5/5/2023 2000 by Clemente Harris RN  Head of Bed (HOB) Positioning: HOB flat     Problem: Fall Injury Risk  Goal: Absence of Fall and Fall-Related Injury  Outcome: Ongoing, Progressing  Intervention: Promote Injury-Free Environment  Recent Flowsheet Documentation  Taken 5/6/2023 0411 by Clemente Harris RN  Safety Promotion/Fall Prevention: safety round/check completed  Taken 5/6/2023 0300 by Clemente Harris RN  Safety Promotion/Fall Prevention: safety round/check completed  Taken 5/6/2023 0150 by Clemente Harris RN  Safety Promotion/Fall Prevention: safety round/check completed  Taken 5/6/2023 0100 by Clemente Harris RN  Safety Promotion/Fall Prevention: safety round/check completed  Taken 5/6/2023 0005 by Clemente Harris RN  Safety Promotion/Fall Prevention: safety round/check completed  Taken 5/5/2023 2300 by Clemente Harris RN  Safety Promotion/Fall Prevention: safety round/check completed  Taken 5/5/2023 2200 by Clemente Harris RN  Safety Promotion/Fall Prevention:  safety round/check completed  Taken 5/5/2023 2100 by Clemente Harris RN  Safety Promotion/Fall Prevention: safety round/check completed  Taken 5/5/2023 2000 by Clemente Harris RN  Safety Promotion/Fall Prevention: safety round/check completed  Taken 5/5/2023 1900 by Clemente Harris RN  Safety Promotion/Fall Prevention: safety round/check completed     Problem: Adult Inpatient Plan of Care  Goal: Plan of Care Review  Outcome: Ongoing, Progressing  Goal: Patient-Specific Goal (Individualized)  Outcome: Ongoing, Progressing  Goal: Absence of Hospital-Acquired Illness or Injury  Outcome: Ongoing, Progressing  Intervention: Identify and Manage Fall Risk  Recent Flowsheet Documentation  Taken 5/6/2023 0411 by Clemente Harris RN  Safety Promotion/Fall Prevention: safety round/check completed  Taken 5/6/2023 0300 by Clemente Harris RN  Safety Promotion/Fall Prevention: safety round/check completed  Taken 5/6/2023 0150 by Clemente Harris RN  Safety Promotion/Fall Prevention: safety round/check completed  Taken 5/6/2023 0100 by Clemente Harris RN  Safety Promotion/Fall Prevention: safety round/check completed  Taken 5/6/2023 0005 by Clemente Harris RN  Safety Promotion/Fall Prevention: safety round/check completed  Taken 5/5/2023 2300 by Clemente Harris RN  Safety Promotion/Fall Prevention: safety round/check completed  Taken 5/5/2023 2200 by Clemente Harris RN  Safety Promotion/Fall Prevention: safety round/check completed  Taken 5/5/2023 2100 by Clemente Harris RN  Safety Promotion/Fall Prevention: safety round/check completed  Taken 5/5/2023 2000 by Clemente Harris RN  Safety Promotion/Fall Prevention: safety round/check completed  Taken 5/5/2023 1900 by Clemente Harris RN  Safety Promotion/Fall Prevention: safety round/check completed  Intervention: Prevent Skin Injury  Recent Flowsheet Documentation  Taken 5/6/2023 0411 by Clemente Harris RN  Body Position:   turned   right  Taken 5/6/2023 0150 by Clemente Harris RN  Body Position: supine  Taken  5/6/2023 0005 by Clemente Harris RN  Body Position:   turned   left  Taken 5/5/2023 2200 by Clemente Harris RN  Body Position:   turned   right  Taken 5/5/2023 2000 by Clemente Harris RN  Body Position:   turned   left  Intervention: Prevent and Manage VTE (Venous Thromboembolism) Risk  Recent Flowsheet Documentation  Taken 5/6/2023 0411 by Clemente Harris RN  VTE Prevention/Management: sequential compression devices on  Taken 5/6/2023 0005 by Clemente Harris RN  Activity Management: bedrest  VTE Prevention/Management: sequential compression devices on  Taken 5/5/2023 2000 by Clemente Harris RN  Activity Management: bedrest  VTE Prevention/Management: sequential compression devices on  Taken 5/5/2023 1910 by Clemente Harris RN  VTE Prevention/Management: sequential compression devices on  Goal: Optimal Comfort and Wellbeing  Outcome: Ongoing, Progressing  Goal: Readiness for Transition of Care  Outcome: Ongoing, Progressing  Intervention: Mutually Develop Transition Plan  Recent Flowsheet Documentation  Taken 5/6/2023 0220 by Clemente Harris RN  Transportation Anticipated: agency  Patient/Family Anticipated Services at Transition: none  Patient/Family Anticipates Transition to: (unknown at this time) other (see comments)  Taken 5/6/2023 0217 by Clemente Harris RN  Equipment Currently Used at Home: none     Problem: Fluid Imbalance (Pneumonia)  Goal: Fluid Balance  Outcome: Ongoing, Progressing     Problem: Infection (Pneumonia)  Goal: Resolution of Infection Signs and Symptoms  Outcome: Ongoing, Progressing     Problem: Respiratory Compromise (Pneumonia)  Goal: Effective Oxygenation and Ventilation  Outcome: Ongoing, Progressing  Intervention: Promote Airway Secretion Clearance  Recent Flowsheet Documentation  Taken 5/6/2023 0005 by Clemente Harris RN  Cough And Deep Breathing: unable to perform  Taken 5/5/2023 2000 by Clemente Harris RN  Cough And Deep Breathing: unable to perform  Intervention: Optimize Oxygenation and  Ventilation  Recent Flowsheet Documentation  Taken 5/6/2023 0411 by Clemente Harris RN  Head of Bed (HOB) Positioning: HOB at 20-30 degrees  Taken 5/6/2023 0150 by Clemente Harris RN  Head of Bed (HOB) Positioning: HOB at 20 degrees  Taken 5/6/2023 0005 by Clemente Harris RN  Head of Bed (HOB) Positioning: HOB at 20 degrees  Taken 5/5/2023 2200 by Clemente Harris RN  Head of Bed (HOB) Positioning: HOB flat  Taken 5/5/2023 2000 by Clemente Harris RN  Head of Bed (HOB) Positioning: HOB flat   Goal Outcome Evaluation:

## 2023-05-06 NOTE — CONSULTS
"Pharmacy Dosing Service  Pharmacokinetics  Vancomycin Initial Evaluation  Assessment/Action/Plan:  Loading dose?: 1250mg  Current Order: Vancomycin 500 mg IVPB every 12 hours  Current end date:5/10  Levels: not ordered  Additional antimicrobial agent(s): Zosyn    Vancomycin dosage initiated based on population pharmacokinetic parameters. Pharmacy will continue to follow daily and adjust dose accordingly.     Subjective:  Jess Ramsey is a 72 y.o. female with a Vancomycin \"Pharmacy to Dose\" consult for the treatment of sepsis,PNA , day 1 of 5 of treatment.    AUC Model Data:  Loading dose: 1250mg  Regimen: 500 mg IV every 12 hours.  Start time: 21:13 on 05/05/2023  Exposure target: AUC24 (range)400-600 mg/L.hr   AUC24,ss: 389 mg/L.hr  PAUC*: 47 %  Ctrough,ss: 12.9 mg/L  Pconc*: 10 %  Tox.: 8 %      Objective:  Ht: 154.9 cm (61\"); Wt: 57 kg (125 lb 11.2 oz)  Estimated Creatinine Clearance: 54.3 mL/min (by C-G formula based on SCr of 0.83 mg/dL).   Creatinine   Date Value Ref Range Status   05/05/2023 0.83 0.57 - 1.00 mg/dL Final   01/06/2021 0.6 0.5 - 1.1 mg/dL Final   11/02/2020 0.6 0.5 - 1.1 mg/dL Final   10/23/2019 0.9 0.5 - 1.1 mg/dL Final      Lab Results   Component Value Date    WBC 7.8 10/23/2019    WBC 11.8 (H) 07/21/2019    WBC 9.4 05/24/2019      Baseline culture results:  Microbiology Results (last 10 days)       Procedure Component Value - Date/Time    S. Pneumo Ag Urine or CSF - Urine, Urine, Clean Catch [[id number]]  (Normal) Collected: 05/05/23 1942    Lab Status: Final result Specimen: Urine, Clean Catch Updated: 05/05/23 2035     Strep Pneumo Ag Negative    Legionella Antigen, Urine - Urine, Urine, Clean Catch [[id number]]  (Normal) Collected: 05/05/23 1942    Lab Status: Final result Specimen: Urine, Clean Catch Updated: 05/05/23 2035     LEGIONELLA ANTIGEN, URINE Negative            Princess Stern [facility]  05/05/23 21:13 CDT    "

## 2023-05-06 NOTE — PLAN OF CARE
Goal Outcome Evaluation:  Plan of Care Reviewed With: patient, other (see comments)        Progress: no change  Outcome Evaluation: Initial nutrition assessment.Pt transferred from a hospital in [place] d/t not having an ICU. She lives at a group home in [place], [place]. She has a hx of stroke and cognitive deficit. She is non-verbal at baseline. She is NPO at present. ST to eval when she becomes more awake. She has a stage 2 wound to gluteal area. She is dependent for ADL's. Unsure of recent appetite or diet consistency required. Will follow for nutrition POC and SLP diet recommendations once able to eval. She would likely benefit from an ONS and MVI w/minerals once diet started. If unable to start/tolerate a po diet, she may need enteral/parenteral nutrition if within POC goals. Will follow per protocol.

## 2023-05-06 NOTE — CONSULTS
"Pharmacy Dosing Service  Antimicrobial Dosing  Zosyn    Assessment/Action/Plan:  Based on indication and renal function, Zosyn 4.5 gm IV every 8 hours. Pharmacy will continue to monitor daily and make further adjustment(s) accordingly.     Subjective:  Jess Ramsey is a 72 y.o. female with a  \"Pharmacy to Dose  \" consult for the treatment of PNA, sepsis , day 1 of 3 of treatment.    Objective:  Ht: 154.9 cm (61\"); Wt: 57 kg (125 lb 11.2 oz)  Estimated Creatinine Clearance: 54.3 mL/min (by C-G formula based on SCr of 0.83 mg/dL).   Creatinine   Date Value Ref Range Status   05/05/2023 0.83 0.57 - 1.00 mg/dL Final   01/06/2021 0.6 0.5 - 1.1 mg/dL Final   11/02/2020 0.6 0.5 - 1.1 mg/dL Final   10/23/2019 0.9 0.5 - 1.1 mg/dL Final      Lab Results   Component Value Date    WBC 7.8 10/23/2019    WBC 11.8 (H) 07/21/2019    WBC 9.4 05/24/2019      Baseline culture results:  Microbiology Results (last 10 days)       Procedure Component Value - Date/Time    S. Pneumo Ag Urine or CSF - Urine, Urine, Clean Catch [[id number]]  (Normal) Collected: 05/05/23 1942    Lab Status: Final result Specimen: Urine, Clean Catch Updated: 05/05/23 2035     Strep Pneumo Ag Negative    Legionella Antigen, Urine - Urine, Urine, Clean Catch [[id number]]  (Normal) Collected: 05/05/23 1942    Lab Status: Final result Specimen: Urine, Clean Catch Updated: 05/05/23 2035     LEGIONELLA ANTIGEN, URINE Negative            Princess Stern [facility]  05/05/23 21:02 CDT    "

## 2023-05-06 NOTE — ANESTHESIA PREPROCEDURE EVALUATION
Anesthesia Evaluation                  Airway   Mallampati: I  Dental    (+) poor dentition and edentulous    Pulmonary    (+) pneumonia ,   Cardiovascular   Exercise tolerance: poor (<4 METS)    Beta blocker given within 24 hours of surgery    (+) hypertension,     ROS comment: Currently on 0.2 of levophed    Neuro/Psych  (+) CVA residual symptoms,      ROS Comment: Mental retardation.   GI/Hepatic/Renal/Endo      Musculoskeletal     Abdominal    Substance History      OB/GYN          Other   blood dyscrasia anemia,                   Anesthesia Plan    ASA 4 - emergent     general   Rapid sequence  (Pt is a [place] living in a nursing home. Pt is mentally impaired and unable to provide consent or medical history. Medical history obtained from the chart. Proceeded to OR emergently. Pt currently receiving 1unit PRBC for hgb of 7.2)  intravenous induction       Plan discussed with CRNA.        CODE STATUS:    Code Status (Patient has no pulse and is not breathing): CPR (Attempt to Resuscitate)  Medical Interventions (Patient has pulse or is breathing): Full Support  Comments: Records from [place]

## 2023-05-06 NOTE — PLAN OF CARE
Goal Outcome Evaluation:  Plan of Care Reviewed With: other (see comments) (guardian)        Progress: improving  Outcome Evaluation: Large liquid tarry to dark red stools this morning.  Protonix drip, DDAVP, platelets, and 1 unit PRBCs given.  Sent for endo this afternoon.  No stools this afternoon and have weaned off levophed.  T94.7, warming blanket applied.  General surgery consulted in case rebleeding occurs, palliative care consulted for Monday.

## 2023-05-06 NOTE — PLAN OF CARE
Goal Outcome Evaluation:  Plan of Care Reviewed With: patient        Progress: no change  Outcome Evaluation: Received patient in transfer from outside hospital.  She is maxed on levophed drip receiving fluid bolus.  Central line and villagran are in place.  She is on 3L NC. She is responsive to pain only.

## 2023-05-06 NOTE — CONSULTS
[facility] Gastroenterology  Inpatient Consult Note  Today's date:  05/06/23    Jess Ramsey  1950       Referring Provider: David Frazier MD  Primary Physician: David Frazier MD     Date of Admission: 5/5/2023  Date of Service:  05/06/23    Reason for Consultation/Chief Complaint:   Melena     History of present illness:    Patient is a 72-year-old female with a history of developmental delay, prior CVA and cognitive deficit who is a snow of the [facility] and has been designated a state appointed guardian who was transferred from an outlying facility with hypothermia and hypotension and was designated a DNR.  She was excepted at our facility where a work-up revealed a probable RLL pneumonia, and she has been receiving antibiotics.  Since admission she has had a large melanotic stool this morning when a GI consult was obtained.  Hgb at that time was 6.7 compared to a prior hemoglobin of 14.1 on 12/22/2021.  She received 2 units PRBCs with increase hemoglobin of 10 decreasing to 9.2 and a 12 PM hemoglobin dropping further to 7.8.  Her INR was 1.38 and her platelet count was 59 which had decreased from 271 on 12/22/2021.  She had been given a unit of platelets in addition to a dose of DDAVP.  She was also started on Protonix IV drip.  She has had 2 additional stools which have become maroon.  Her BUNs/creatinine was 52/0.62 she was placed on Levophed pressor support which stabilized her blood pressure and it further responded to the colloid.  She continues to be on Levophed.  The patient is not able to communicate very well but she does appear that she is asking to go home.  She is not following verbal commands.  She is unable to verbalize what else is bothering her.  According to her facility, she does get out of bed and eat her meals and have some degree of a quality of life.  When they were told she would be DNR at the other facility they were surprised that she has been somewhat  functional at the facility.  No obvious history of GI bleeding as per care everywhere.  She had an EUS performed due to a pancreatic cyst which revealed a main IPMN and an ERCP attempt at that time that was not successful.     Past Medical History:   Diagnosis Date   • Gastritis    • Hypertension    • Intellectual disability    • Psychosis    • Right hemiparesis    • Stroke        Past Surgical History:   Procedure Laterality Date   • HIP SURGERY Right 2019        No Known Allergies    Medications Prior to Admission   Medication Sig Dispense Refill Last Dose   • acetaminophen (TYLENOL) 325 MG tablet Take 2 tablets by mouth Every 6 (Six) Hours As Needed for Mild Pain.      • albuterol (PROVENTIL) (2.5 MG/3ML) 0.083% nebulizer solution Take 2.5 mg by nebulization Every 4 (Four) Hours As Needed for Wheezing.      • amLODIPine (NORVASC) 5 MG tablet Take 1 tablet by mouth Daily.      • aspirin 81 MG chewable tablet Chew 1 tablet Daily.      • atorvastatin (LIPITOR) 10 MG tablet Take 1 tablet by mouth Every Night.      • calcium carbonate (OS-SAVANA) 600 MG tablet Take 1 tablet by mouth 2 (Two) Times a Day With Meals.      • carvedilol (COREG) 12.5 MG tablet Take 1 tablet by mouth 2 (Two) Times a Day With Meals.      • Loratadine 10 MG capsule Take 1 capsule by mouth Daily As Needed.      • multivitamins-minerals (PRESERVISION AREDS 2) capsule capsule Take 1 capsule by mouth 2 (Two) Times a Day.      • risperiDONE (risperDAL) 0.5 MG tablet Take 1 tablet by mouth Daily.      • risperiDONE (risperDAL) 1 MG tablet Take 1 tablet by mouth Every Night.      • sennosides-docusate (Senexon-S) 8.6-50 MG per tablet Take 1 tablet by mouth Every Night.      • triamterene-hydrochlorothiazide (DYAZIDE) 37.5-25 MG per capsule Take 1 capsule by mouth Every Morning.          Hospital Medications (active)       Dose Frequency Start End    acetaminophen (TYLENOL) 160 MG/5ML solution 650 mg 650 mg Every 4 Hours PRN 5/5/2023     Admin  Instructions: Based on patient request - if ordered for moderate or severe pain, provider allows for administration of a medication prescribed for a lower pain scale.  Do not exceed 4 grams of acetaminophen in a 24 hr period. Max dose of 2gm for AST/ALT greater than 120 units/L    If given for fever, use fever parameter: fever greater than 100.4 °F.    If given for pain, use the following pain scale:   Mild Pain = Pain Score of 1-3, CPOT 1-2  Moderate Pain = Pain Score of 4-6, CPOT 3-4  Severe Pain = Pain Score of 7-10, CPOT 5-8    Route: Oral    Linked Group 1: See Hyperspace for full Linked Orders Report.        acetaminophen (TYLENOL) suppository 650 mg 650 mg Every 4 Hours PRN 5/5/2023     Admin Instructions: Based on patient request - if ordered for moderate or severe pain, provider allows for administration of a medication prescribed for a lower pain scale.  Do not exceed 4 grams of acetaminophen in a 24 hr period. Max dose of 2gm for AST/ALT greater than 120 units/L    If given for fever, use fever parameter: fever greater than 100.4 °F.    If given for pain, use the following pain scale:   Mild Pain = Pain Score of 1-3, CPOT 1-2  Moderate Pain = Pain Score of 4-6, CPOT 3-4  Severe Pain = Pain Score of 7-10, CPOT 5-8    Route: Rectal    Linked Group 1: See [employer] for full Linked Orders Report.        acetaminophen (TYLENOL) tablet 650 mg 650 mg Every 4 Hours PRN 5/5/2023     Admin Instructions: Based on patient request - if ordered for moderate or severe pain, provider allows for administration of a medication prescribed for a lower pain scale.  Do not exceed 4 grams of acetaminophen in a 24 hr period. Max dose of 2gm for AST/ALT greater than 120 units/L    If given for fever, use fever parameter: fever greater than 100.4 °F.    If given for pain, use the following pain scale:   Mild Pain = Pain Score of 1-3, CPOT 1-2  Moderate Pain = Pain Score of 4-6, CPOT 3-4  Severe Pain = Pain Score of 7-10, CPOT 5-8     Route: Oral    Linked Group 1: See [facility] for full Linked Orders Report.        desmopressin (DDAVP) 18 mcg in sodium chloride 0.9 % 50 mL IVPB 0.3 mcg/kg × 60 kg Once 5/6/2023     Route: Intravenous    ipratropium-albuterol (DUO-NEB) nebulizer solution 3 mL 3 mL 4 Times Daily - RT 5/5/2023     Admin Instructions: Include Respiratory Treatment Education    Route: Nebulization    lactated ringers infusion 100 mL/hr Continuous 5/6/2023     Route: Intravenous    norepinephrine (LEVOPHED) 8 mg in 250 mL NS infusion (premix) 0.02-0.3 mcg/kg/min × 57 kg Titrated 5/5/2023     Admin Instructions: Initiate infusion at 0.02 mcg/kg/min and titrate by 0.02 - 0.06 mcg/kg/min every 5 - 10 minutes to use the lowest dose possible to maintain a MAP greater than or equal To 65 mmHg. Maximum Dose = 0.3 mcg/kg/min. Contact provider if unable to maintain MAP target at the maximum dose or if MAP is greater than 95 mmHg. Once MAP target achieved, obtain vitals a minimum of every 30 minutes.  With provider order may titrate to maximum dose of 0.5 mcg/kg/min.  Concentration 8 mg/250 mL          Central line preferred, if unavailable use large bore IV access with frequent nurse monitoring of IV site.    Route: Intravenous    ondansetron (ZOFRAN) injection 4 mg 4 mg Every 6 Hours PRN 5/5/2023     Admin Instructions: If BOTH ondansetron (ZOFRAN) and promethazine (PHENERGAN) are ordered use ondansetron first and THEN promethazine IF ondansetron is ineffective.    Route: Intravenous    pantoprazole (PROTONIX) 40 mg in 100mL NS IVPB 8 mg/hr Continuous 5/6/2023     Admin Instructions: Break seal and mix to activate vial before use    Route: Intravenous    Pharmacy Consult  Continuous PRN 5/6/2023     Route: Does not apply    Pharmacy to Dose Zosyn  Continuous PRN 5/5/2023 5/8/2023    Route: Does not apply    piperacillin-tazobactam (ZOSYN) 4.5 g in iso-osmotic dextrose 100 mL IVPB (premix) 4.5 g Every 8 Hours 5/6/2023 5/9/2023    Admin  Instructions: Refrigerate    Route: Intravenous    sodium chloride 0.9 % flush 10 mL 10 mL Every 12 Hours Scheduled 5/5/2023     Route: Intravenous    sodium chloride 0.9 % flush 10 mL 10 mL As Needed 5/5/2023     Route: Intravenous    sodium chloride 0.9 % infusion 40 mL 40 mL As Needed 5/5/2023     Admin Instructions: Following administration of an IV intermittent medication, flush line with 40mL NS at 100mL/hr.    Route: Intravenous          Social History     Tobacco Use   • Smoking status: Unknown   • Smokeless tobacco: Not on file   Substance Use Topics   • Alcohol use: Defer        Past Family History:  Family History   Family history unknown: Yes       Review of Systems:  Unable to obtain secondary to mental status      Physical Exam:  Temp:  [96.9 °F (36.1 °C)-98.5 °F (36.9 °C)] 98.5 °F (36.9 °C)  Heart Rate:  [76-99] 93  Resp:  [12-22] 19  BP: ()/() 67/56  Body mass index is 25 kg/m².    Intake/Output Summary (Last 24 hours) at 5/6/2023 0902  Last data filed at 5/6/2023 0800  Gross per 24 hour   Intake 3559 ml   Output 1130 ml   Net 2429 ml     I/O this shift:  In: 481 [I.V.:415; IV Piggyback:66]  Out: 200 [Urine:200]    General appearance:   HEENT: Nonicteric sclerae.    Lungs: Good expansion bilaterally  Heart: Regular rate and rhythm.    Abdomen: Soft, nondistended, possibly tender upper abdomen.  Extremities: No cyanosis, edema or pulse deficits.  Skin: No rash or jaundice.    Results Review:  Lab Results (last 24 hours)     Procedure Component Value Units Date/Time    aPTT [[id number]]  (Abnormal) Collected: 05/06/23 0839    Specimen: Blood Updated: 05/06/23 0856     PTT 39.0 seconds     Protime-INR [[id number]]  (Abnormal) Collected: 05/06/23 0839    Specimen: Blood Updated: 05/06/23 0856     Protime 17.2 Seconds      INR 1.38    Hemoglobin & Hematocrit, Blood [[id number]]  (Abnormal) Collected: 05/06/23 0834    Specimen: Blood Updated: 05/06/23 0846     Hemoglobin 9.2 g/dL      Hematocrit  27.1 %     Blood Culture With SUSHIL - Blood, Arm, Left [[id number]]  (Normal) Collected: 05/05/23 1959    Specimen: Blood from Arm, Left Updated: 05/06/23 0815     Blood Culture No growth at less than 24 hours    Basic Metabolic Panel [[id number]]  (Abnormal) Collected: 05/06/23 0400    Specimen: Blood Updated: 05/06/23 0459     Glucose 156 mg/dL      BUN 52 mg/dL      Creatinine 0.62 mg/dL      Sodium 147 mmol/L      Potassium 3.6 mmol/L      Comment: Slight hemolysis detected by analyzer. Results may be affected.        Chloride 120 mmol/L      CO2 19.0 mmol/L      Calcium 7.5 mg/dL      BUN/Creatinine Ratio 83.9     Anion Gap 8.0 mmol/L      eGFR 94.2 mL/min/1.73     Narrative:      GFR Normal >60  Chronic Kidney Disease <60  Kidney Failure <15    The GFR formula is only valid for adults with stable renal function between ages 18 and 70.    CBC (No Diff) [[id number]]  (Abnormal) Collected: 05/06/23 0400    Specimen: Blood Updated: 05/06/23 0458     WBC 8.66 10*3/mm3      RBC 3.51 10*6/mm3      Hemoglobin 10.0 g/dL      Hematocrit 30.4 %      MCV 86.6 fL      MCH 28.5 pg      MCHC 32.9 g/dL      RDW 13.9 %      RDW-SD 43.7 fl      MPV 13.4 fL      Platelets 59 10*3/mm3     MRSA Screen, PCR (Inpatient) - Swab, Nares [[id number]]  (Normal) Collected: 05/05/23 1946    Specimen: Swab from Nares Updated: 05/05/23 2138     MRSA PCR No MRSA Detected    Narrative:      The negative predictive value of this diagnostic test is high and should only be used to consider de-escalating anti-MRSA therapy. A positive result may indicate colonization with MRSA and must be correlated clinically.    Manual Differential [[id number]]  (Abnormal) Collected: 05/05/23 2000    Specimen: Blood Updated: 05/05/23 2127     Neutrophil % 72.6 %      Lymphocyte % 23.4 %      Monocyte % 2.4 %      Basophil % 1.6 %      Neutrophils Absolute 5.11 10*3/mm3      Lymphocytes Absolute 1.65 10*3/mm3      Monocytes Absolute 0.17 10*3/mm3      Basophils Absolute  0.11 10*3/mm3      nRBC 7.3 /100 WBC      Poikilocytes Slight/1+     Target Cells Slight/1+     WBC Morphology Normal     Platelet Estimate Decreased     Giant Platelets Large/3+    CBC Auto Differential [[id number]]  (Abnormal) Collected: 05/05/23 2000    Specimen: Blood Updated: 05/05/23 2127     WBC 7.04 10*3/mm3      RBC 2.32 10*6/mm3      Hemoglobin 6.7 g/dL      Hematocrit 19.4 %      MCV 83.6 fL      MCH 28.9 pg      MCHC 34.5 g/dL      RDW 15.0 %      RDW-SD 45.0 fl      MPV 12.4 fL      Platelets 80 10*3/mm3     Blood Gas, Arterial - [[id number]]  (Abnormal) Collected: 05/05/23 2103    Specimen: Arterial Blood Updated: 05/05/23 2101     Site Right Radial     Cayden's Test Positive     pH, Arterial 7.430 pH units      pCO2, Arterial 35.1 mm Hg      pO2, Arterial 92.1 mm Hg      HCO3, Arterial 23.3 mmol/L      Base Excess, Arterial -0.9 mmol/L      Comment: 84 Value below reference range        O2 Saturation, Arterial 98.4 %      Temperature 37.0 C      Barometric Pressure for Blood Gas 751 mmHg      Modality Nasal Cup     Flow Rate 2.0 lpm      Ventilator Mode NA     Collected by [id number]     Comment: Meter: [id number]     :  [id number]        pCO2, Temperature Corrected 35.1 mm Hg      pH, Temp Corrected 7.430 pH Units      pO2, Temperature Corrected 92.1 mm Hg     Procalcitonin [[id number]]  (Abnormal) Collected: 05/05/23 2000    Specimen: Blood Updated: 05/05/23 2042     Procalcitonin 0.27 ng/mL     Narrative:      As a Marker for Sepsis (Non-Neonates):    1. <0.5 ng/mL represents a low risk of severe sepsis and/or septic shock.  2. >2 ng/mL represents a high risk of severe sepsis and/or septic shock.    As a Marker for Lower Respiratory Tract Infections that require antibiotic therapy:    PCT on Admission    Antibiotic Therapy       6-12 Hrs later    >0.5                Strongly Recommended  >0.25 - <0.5        Recommended   0.1 - 0.25          Discouraged              Remeasure/reassess PCT  <0.1   "              Strongly Discouraged     Remeasure/reassess PCT    As 28 day mortality risk marker: \"Change in Procalcitonin Result\" (>80% or <=80%) if Day 0 (or Day 1) and Day 4 values are available. Refer to [email or web address]-pct-[email or web address]    Change in PCT <=80%  A decrease of PCT levels below or equal to 80% defines a positive change in PCT test result representing a higher risk for 28-day all-cause mortality of patients diagnosed with severe sepsis for septic shock.    Change in PCT >80%  A decrease of PCT levels of more than 80% defines a negative change in PCT result representing a lower risk for 28-day all-cause mortality of patients diagnosed with severe sepsis or septic shock.       S. Pneumo Ag Urine or CSF - Urine, Urine, Clean Catch [[id number]]  (Normal) Collected: 05/05/23 1942    Specimen: Urine, Clean Catch Updated: 05/05/23 2035     Strep Pneumo Ag Negative    Magnesium [[id number]]  (Normal) Collected: 05/05/23 2000    Specimen: Blood Updated: 05/05/23 2035     Magnesium 1.7 mg/dL     Comprehensive Metabolic Panel [[id number]]  (Abnormal) Collected: 05/05/23 2000    Specimen: Blood Updated: 05/05/23 2035     Glucose 145 mg/dL      BUN 56 mg/dL      Creatinine 0.83 mg/dL      Sodium 144 mmol/L      Potassium 3.9 mmol/L      Comment: Slight hemolysis detected by analyzer. Results may be affected.        Chloride 114 mmol/L      CO2 25.0 mmol/L      Calcium 7.8 mg/dL      Total Protein 3.9 g/dL      Albumin 2.1 g/dL      ALT (SGPT) 54 U/L      AST (SGOT) 39 U/L      Alkaline Phosphatase 53 U/L      Total Bilirubin 0.3 mg/dL      Globulin 1.8 gm/dL      A/G Ratio 1.2 g/dL      BUN/Creatinine Ratio 67.5     Anion Gap 5.0 mmol/L      eGFR 74.5 mL/min/1.73     Narrative:      GFR Normal >60  Chronic Kidney Disease <60  Kidney Failure <15    The GFR formula is only valid for adults with stable renal function between ages 18 and 70.    Legionella Antigen, Urine - Urine, Urine, Clean Catch [[id number]]  " (Normal) Collected: 05/05/23 1942    Specimen: Urine, Clean Catch Updated: 05/05/23 2035     LEGIONELLA ANTIGEN, URINE Negative    Lactic Acid, Plasma [[id number]]  (Normal) Collected: 05/05/23 2000    Specimen: Blood Updated: 05/05/23 2033     Lactate 1.8 mmol/L     Phosphorus [[id number]]  (Abnormal) Collected: 05/05/23 2000    Specimen: Blood Updated: 05/05/23 2032     Phosphorus 2.1 mg/dL           Radiology Review:  Imaging Results (Last 72 Hours)     Procedure Component Value Units Date/Time    XR Chest 1 View [[id number]] Collected: 05/05/23 1959     Updated: 05/05/23 2003    Narrative:      EXAM/TECHNIQUE: XR CHEST 1 VW-     INDICATION: Line placement, pneumonia.     COMPARISON: None     FINDINGS:     Right-sided CVL with tip overlying the cavoatrial junction. No visible  pneumothorax. Hazy veiling RIGHT basilar opacity. LEFT lung and pleural  space are clear. Cardiac silhouette is normal size. No acute osseous  finding.       Impression:         1.  Right-sided CVL with tip overlying the low SVC. No pneumothorax.  2.  Veiling RIGHT basilar opacity, likely representing small layering  pleural effusion and/or atelectasis.  This report was finalized on 05/05/2023 20:00 by Dr. Miguel Angel Grewal MD.          Impression/Plan:  Patient Active Problem List   Diagnosis Code   • Septic shock A41.9, R65.21   • Right lower lobe pneumonia J18.9   • Intellectual disability F79   • Anemia D64.9   • Thrombocytopenia D69.6   • Transaminitis R74.01   • Hypoxia R09.02   • Metabolic encephalopathy G93.41       1.  Melena/acute blood loss anemia.  Elevated BUN.  Levophed support.  Suggestive of brisk upper GI bleed.  Thrombocytopenia being corrected.  Status post 2 units PRBCs.  Protonix IV drip on board.  Status post DDAVP for potential qualitative platelet defect.    -Stat EGD   -Continue aggressive support  -If EGD is negative, will need CTA if possible, if not bleeding scan with further recommendations.  May require transfer to  facility with interventional radiology.      2. RLL pneumonia/    -Continue antibiotics    Sherif Villegas MD  05/06/23   09:02 CDT

## 2023-05-06 NOTE — ANESTHESIA PROCEDURE NOTES
Airway  Urgency: elective    Date/Time: 5/6/2023 1:58 PM  Airway not difficult    General Information and Staff    Patient location during procedure: OR  CRNA/CAA: Chandni Aguilar CRNA    Indications and Patient Condition  Indications for airway management: airway protection    Preoxygenated: yes  Mask difficulty assessment: 0 - not attempted    Final Airway Details  Final airway type: endotracheal airway      Successful airway: ETT  Cuffed: yes   Successful intubation technique: direct laryngoscopy, video laryngoscopy and RSI  Facilitating devices/methods: intubating stylet and cricoid pressure  Endotracheal tube insertion site: oral  Blade: Armenta  ETT size (mm): 7.5  Cormack-Lehane Classification: grade I - full view of glottis  Placement verified by: chest auscultation and capnometry   Cuff volume (mL): 8  Measured from: lips  ETT/EBT  to lips (cm): 21  Number of attempts at approach: 1  Assessment: lips, teeth, and gum same as pre-op and atraumatic intubation

## 2023-05-07 LAB
ALBUMIN SERPL-MCNC: 2.4 G/DL (ref 3.5–5.2)
ALBUMIN/GLOB SERPL: 1.5 G/DL
ALP SERPL-CCNC: 51 U/L (ref 39–117)
ALT SERPL W P-5'-P-CCNC: 42 U/L (ref 1–33)
ANION GAP SERPL CALCULATED.3IONS-SCNC: 4 MMOL/L (ref 5–15)
AST SERPL-CCNC: 39 U/L (ref 1–32)
BASOPHILS # BLD AUTO: 0.02 10*3/MM3 (ref 0–0.2)
BASOPHILS NFR BLD AUTO: 0.2 % (ref 0–1.5)
BH BB BLOOD EXPIRATION DATE: NORMAL
BH BB BLOOD TYPE BARCODE: 5100
BH BB BLOOD TYPE BARCODE: 5100
BH BB BLOOD TYPE BARCODE: 6200
BH BB BLOOD TYPE BARCODE: 9500
BH BB DISPENSE STATUS: NORMAL
BH BB PRODUCT CODE: NORMAL
BH BB UNIT NUMBER: NORMAL
BILIRUB SERPL-MCNC: 0.7 MG/DL (ref 0–1.2)
BUN SERPL-MCNC: 31 MG/DL (ref 8–23)
BUN/CREAT SERPL: 60.8 (ref 7–25)
CALCIUM SPEC-SCNC: 8.3 MG/DL (ref 8.6–10.5)
CHLORIDE SERPL-SCNC: 123 MMOL/L (ref 98–107)
CHOLEST SERPL-MCNC: 81 MG/DL (ref 0–200)
CO2 SERPL-SCNC: 26 MMOL/L (ref 22–29)
CREAT SERPL-MCNC: 0.51 MG/DL (ref 0.57–1)
CROSSMATCH INTERPRETATION: NORMAL
DEPRECATED RDW RBC AUTO: 43.6 FL (ref 37–54)
EGFRCR SERPLBLD CKD-EPI 2021: 98.7 ML/MIN/1.73
EOSINOPHIL # BLD AUTO: 0.08 10*3/MM3 (ref 0–0.4)
EOSINOPHIL NFR BLD AUTO: 0.9 % (ref 0.3–6.2)
ERYTHROCYTE [DISTWIDTH] IN BLOOD BY AUTOMATED COUNT: 14.2 % (ref 12.3–15.4)
GLOBULIN UR ELPH-MCNC: 1.6 GM/DL
GLUCOSE SERPL-MCNC: 78 MG/DL (ref 65–99)
HBA1C MFR BLD: 5.7 % (ref 4.8–5.6)
HCT VFR BLD AUTO: 23.4 % (ref 34–46.6)
HCT VFR BLD AUTO: 27.1 % (ref 34–46.6)
HCT VFR BLD AUTO: 28 % (ref 34–46.6)
HCT VFR BLD AUTO: 28.7 % (ref 34–46.6)
HDLC SERPL-MCNC: 26 MG/DL (ref 40–60)
HGB BLD-MCNC: 8.2 G/DL (ref 12–15.9)
HGB BLD-MCNC: 8.7 G/DL (ref 12–15.9)
HGB BLD-MCNC: 9.7 G/DL (ref 12–15.9)
HGB BLD-MCNC: 9.7 G/DL (ref 12–15.9)
INR PPP: 1.09 (ref 0.91–1.09)
LDLC SERPL CALC-MCNC: 37 MG/DL (ref 0–100)
LDLC/HDLC SERPL: 1.43 {RATIO}
LYMPHOCYTES # BLD AUTO: 1.18 10*3/MM3 (ref 0.7–3.1)
LYMPHOCYTES NFR BLD AUTO: 13.8 % (ref 19.6–45.3)
MAGNESIUM SERPL-MCNC: 1.7 MG/DL (ref 1.6–2.4)
MCH RBC QN AUTO: 29.7 PG (ref 26.6–33)
MCHC RBC AUTO-ENTMCNC: 35 G/DL (ref 31.5–35.7)
MCV RBC AUTO: 84.8 FL (ref 79–97)
MONOCYTES # BLD AUTO: 0.66 10*3/MM3 (ref 0.1–0.9)
MONOCYTES NFR BLD AUTO: 7.7 % (ref 5–12)
NEUTROPHILS NFR BLD AUTO: 6.55 10*3/MM3 (ref 1.7–7)
NEUTROPHILS NFR BLD AUTO: 76.6 % (ref 42.7–76)
PLATELET # BLD AUTO: 84 10*3/MM3 (ref 140–450)
PMV BLD AUTO: 11.5 FL (ref 6–12)
POTASSIUM SERPL-SCNC: 3.1 MMOL/L (ref 3.5–5.2)
PROT SERPL-MCNC: 4 G/DL (ref 6–8.5)
PROTHROMBIN TIME: 14.2 SECONDS (ref 11.8–14.8)
RBC # BLD AUTO: 2.76 10*6/MM3 (ref 3.77–5.28)
SODIUM SERPL-SCNC: 153 MMOL/L (ref 136–145)
TRIGL SERPL-MCNC: 89 MG/DL (ref 0–150)
TSH SERPL DL<=0.05 MIU/L-ACNC: 4.05 UIU/ML (ref 0.27–4.2)
UNIT  ABO: NORMAL
UNIT  RH: NORMAL
VLDLC SERPL-MCNC: 18 MG/DL (ref 5–40)
WBC NRBC COR # BLD: 8.56 10*3/MM3 (ref 3.4–10.8)

## 2023-05-07 PROCEDURE — 83036 HEMOGLOBIN GLYCOSYLATED A1C: CPT | Performed by: INTERNAL MEDICINE

## 2023-05-07 PROCEDURE — 85018 HEMOGLOBIN: CPT | Performed by: INTERNAL MEDICINE

## 2023-05-07 PROCEDURE — 85014 HEMATOCRIT: CPT | Performed by: INTERNAL MEDICINE

## 2023-05-07 PROCEDURE — 85014 HEMATOCRIT: CPT | Performed by: FAMILY MEDICINE

## 2023-05-07 PROCEDURE — 25010000002 MORPHINE PER 10 MG: Performed by: INTERNAL MEDICINE

## 2023-05-07 PROCEDURE — 86677 HELICOBACTER PYLORI ANTIBODY: CPT | Performed by: INTERNAL MEDICINE

## 2023-05-07 PROCEDURE — 99221 1ST HOSP IP/OBS SF/LOW 40: CPT | Performed by: SPECIALIST

## 2023-05-07 PROCEDURE — 80053 COMPREHEN METABOLIC PANEL: CPT | Performed by: INTERNAL MEDICINE

## 2023-05-07 PROCEDURE — 83735 ASSAY OF MAGNESIUM: CPT | Performed by: FAMILY MEDICINE

## 2023-05-07 PROCEDURE — 85025 COMPLETE CBC W/AUTO DIFF WBC: CPT | Performed by: INTERNAL MEDICINE

## 2023-05-07 PROCEDURE — 25010000002 ZIPRASIDONE MESYLATE PER 10 MG: Performed by: INTERNAL MEDICINE

## 2023-05-07 PROCEDURE — 25010000002 POTASSIUM CHLORIDE PER 2 MEQ: Performed by: FAMILY MEDICINE

## 2023-05-07 PROCEDURE — 84443 ASSAY THYROID STIM HORMONE: CPT | Performed by: INTERNAL MEDICINE

## 2023-05-07 PROCEDURE — 85018 HEMOGLOBIN: CPT | Performed by: FAMILY MEDICINE

## 2023-05-07 PROCEDURE — 85610 PROTHROMBIN TIME: CPT | Performed by: SPECIALIST

## 2023-05-07 PROCEDURE — 25810000003 DEXTROSE 5 % WITH KCL 20 MEQ 20 MEQ/L SOLUTION: Performed by: FAMILY MEDICINE

## 2023-05-07 PROCEDURE — 80061 LIPID PANEL: CPT | Performed by: INTERNAL MEDICINE

## 2023-05-07 PROCEDURE — 25010000002 PIPERACILLIN SOD-TAZOBACTAM PER 1 G: Performed by: INTERNAL MEDICINE

## 2023-05-07 RX ORDER — MORPHINE SULFATE 2 MG/ML
1 INJECTION, SOLUTION INTRAMUSCULAR; INTRAVENOUS EVERY 4 HOURS PRN
Status: DISCONTINUED | OUTPATIENT
Start: 2023-05-07 | End: 2023-05-12 | Stop reason: RX

## 2023-05-07 RX ORDER — POTASSIUM CHLORIDE, DEXTROSE MONOHYDRATE 150; 5 MG/100ML; G/100ML
75 INJECTION, SOLUTION INTRAVENOUS CONTINUOUS
Status: DISCONTINUED | OUTPATIENT
Start: 2023-05-07 | End: 2023-05-09

## 2023-05-07 RX ORDER — POTASSIUM CHLORIDE 14.9 MG/ML
20 INJECTION INTRAVENOUS ONCE
Status: COMPLETED | OUTPATIENT
Start: 2023-05-07 | End: 2023-05-07

## 2023-05-07 RX ORDER — ZIPRASIDONE MESYLATE 20 MG/ML
10 INJECTION, POWDER, LYOPHILIZED, FOR SOLUTION INTRAMUSCULAR EVERY 12 HOURS PRN
Status: DISCONTINUED | OUTPATIENT
Start: 2023-05-07 | End: 2023-05-15

## 2023-05-07 RX ORDER — CHLORHEXIDINE GLUCONATE 500 MG/1
1 CLOTH TOPICAL ONCE
Status: COMPLETED | OUTPATIENT
Start: 2023-05-07 | End: 2023-05-07

## 2023-05-07 RX ORDER — CHLORHEXIDINE GLUCONATE 500 MG/1
1 CLOTH TOPICAL EVERY 24 HOURS
Status: DISCONTINUED | OUTPATIENT
Start: 2023-05-08 | End: 2023-05-09 | Stop reason: HOSPADM

## 2023-05-07 RX ADMIN — TAZOBACTAM SODIUM AND PIPERACILLIN SODIUM 4.5 G: 500; 4 INJECTION, SOLUTION INTRAVENOUS at 04:20

## 2023-05-07 RX ADMIN — CHLORHEXIDINE GLUCONATE 1 APPLICATION: 500 CLOTH TOPICAL at 12:27

## 2023-05-07 RX ADMIN — MORPHINE SULFATE 1 MG: 2 INJECTION, SOLUTION INTRAMUSCULAR; INTRAVENOUS at 00:16

## 2023-05-07 RX ADMIN — SODIUM CHLORIDE, POTASSIUM CHLORIDE, SODIUM LACTATE AND CALCIUM CHLORIDE 100 ML/HR: 600; 310; 30; 20 INJECTION, SOLUTION INTRAVENOUS at 02:58

## 2023-05-07 RX ADMIN — PANTOPRAZOLE SODIUM 8 MG/HR: 40 INJECTION, POWDER, FOR SOLUTION INTRAVENOUS at 16:15

## 2023-05-07 RX ADMIN — ZIPRASIDONE MESYLATE 10 MG: 20 INJECTION, POWDER, LYOPHILIZED, FOR SOLUTION INTRAMUSCULAR at 02:58

## 2023-05-07 RX ADMIN — POTASSIUM CHLORIDE AND DEXTROSE MONOHYDRATE 50 ML/HR: 150; 5 INJECTION, SOLUTION INTRAVENOUS at 13:53

## 2023-05-07 RX ADMIN — PANTOPRAZOLE SODIUM 8 MG/HR: 40 INJECTION, POWDER, FOR SOLUTION INTRAVENOUS at 04:20

## 2023-05-07 RX ADMIN — Medication 1 APPLICATION: at 21:16

## 2023-05-07 RX ADMIN — Medication 10 ML: at 09:02

## 2023-05-07 RX ADMIN — Medication 1 APPLICATION: at 12:27

## 2023-05-07 RX ADMIN — PANTOPRAZOLE SODIUM 8 MG/HR: 40 INJECTION, POWDER, FOR SOLUTION INTRAVENOUS at 09:02

## 2023-05-07 RX ADMIN — PANTOPRAZOLE SODIUM 8 MG/HR: 40 INJECTION, POWDER, FOR SOLUTION INTRAVENOUS at 21:16

## 2023-05-07 RX ADMIN — TAZOBACTAM SODIUM AND PIPERACILLIN SODIUM 4.5 G: 500; 4 INJECTION, SOLUTION INTRAVENOUS at 12:27

## 2023-05-07 RX ADMIN — POTASSIUM CHLORIDE 20 MEQ: 14.9 INJECTION, SOLUTION INTRAVENOUS at 14:03

## 2023-05-07 RX ADMIN — Medication 10 ML: at 21:16

## 2023-05-07 RX ADMIN — TAZOBACTAM SODIUM AND PIPERACILLIN SODIUM 4.5 G: 500; 4 INJECTION, SOLUTION INTRAVENOUS at 19:34

## 2023-05-07 NOTE — PROGRESS NOTES
[facility] Gastroenterology  Inpatient Progress Note  Today's date:  05/07/23    Jess Ramsey  1950       Reason for Follow Up:    1.  Upper GI bleed  2.  Gastric ulcer    Subjective:   Patient had a few small black stools with the last one being 1 smear.  No new events overnight.  Essentially has been hemodynamically stable.    No Known Allergies    Current Facility-Administered Medications:   •  acetaminophen (TYLENOL) tablet 650 mg, 650 mg, Oral, Q4H PRN **OR** acetaminophen (TYLENOL) 160 MG/5ML solution 650 mg, 650 mg, Oral, Q4H PRN **OR** acetaminophen (TYLENOL) suppository 650 mg, 650 mg, Rectal, Q4H PRN, Sherif Villegas MD  •  [START ON 5/8/2023] Chlorhexidine Gluconate Cloth 2 % pads 1 application, 1 application, Topical, Q24H, Gallo Hazel MD  •  dextrose 5 % with KCl 20 mEq/L infusion, 50 mL/hr, Intravenous, Continuous, Gallo Hazel MD, Last Rate: 50 mL/hr at 05/07/23 1353, 50 mL/hr at 05/07/23 1353  •  ipratropium-albuterol (DUO-NEB) nebulizer solution 3 mL, 3 mL, Nebulization, Q6H PRN, Sherif Villegas MD  •  Morphine sulfate (PF) injection 1 mg, 1 mg, Intravenous, Q4H PRN, David Laird DO, 1 mg at 05/07/23 0016  •  mupirocin (BACTROBAN) 2 % nasal ointment 1 application, 1 application, Each Nare, BID, Gallo Hazel MD, 1 application at 05/07/23 1227  •  ondansetron (ZOFRAN) injection 4 mg, 4 mg, Intravenous, Q6H PRN, Sherif Villegas MD  •  pantoprazole (PROTONIX) 40 mg in 100mL NS IVPB, 8 mg/hr, Intravenous, Continuous, Sherif Villegas MD, Last Rate: 20 mL/hr at 05/07/23 0902, 8 mg/hr at 05/07/23 0902  •  Pharmacy to Dose Zosyn, , Does not apply, Continuous PRN, Sherif Villegas MD  •  piperacillin-tazobactam (ZOSYN) 4.5 g in iso-osmotic dextrose 100 mL IVPB (premix), 4.5 g, Intravenous, Q8H, Sherif Villegas MD, 4.5 g at 05/07/23 1227  •  potassium chloride 20 mEq in 100 mL IVPB, 20 mEq, Intravenous, Once, Gallo Hazel MD, Last Rate: 50 mL/hr at 05/07/23 1403, 20 mEq at  05/07/23 1403  •  sodium chloride 0.9 % flush 10 mL, 10 mL, Intravenous, Q12H, Sherif Villegas MD, 10 mL at 05/07/23 0902  •  sodium chloride 0.9 % flush 10 mL, 10 mL, Intravenous, PRN, Sherif Villegas MD  •  sodium chloride 0.9 % infusion 40 mL, 40 mL, Intravenous, PRN, Sherif Villegas MD  •  ziprasidone (GEODON) injection 10 mg, 10 mg, Intramuscular, Q12H PRN, David Laird DO, 10 mg at 05/07/23 0258    Review of Systems:   Review of Systems   All other systems reviewed and are negative.  Unable to obtain due to mental status; patient minimally verbal; difficult to communicate    Vital Signs:  Temp:  [94.7 °F (34.8 °C)-98.8 °F (37.1 °C)] 97.7 °F (36.5 °C)  Heart Rate:  [71-93] 81  Resp:  [13-16] 15  BP: ()/(37-96) 135/82  Body mass index is 24.83 kg/m².     Intake/Output Summary (Last 24 hours) at 5/7/2023 1601  Last data filed at 5/7/2023 1222  Gross per 24 hour   Intake 3021 ml   Output 1350 ml   Net 1671 ml     I/O this shift:  In: 1039 [I.V.:939; IV Piggyback:100]  Out: 350 [Urine:350]  Physical Exam:  Physical Exam  CV-RRR  Lung-good expansion bilaterally  ABD-benign   Results Review:   I have reviewed all of the patient's current test results    Results from last 7 days   Lab Units 05/07/23  1156 05/07/23  0537 05/07/23  0010 05/06/23  0834 05/06/23  0400 05/05/23 2000   WBC 10*3/mm3  --  8.56  --   --  8.66 7.04   HEMOGLOBIN g/dL 9.7* 8.2* 8.7*   < > 10.0* 6.7*   HEMATOCRIT % 28.0* 23.4* 27.1*   < > 30.4* 19.4*   PLATELETS 10*3/mm3  --  84*  --   --  59* 80*    < > = values in this interval not displayed.       Results from last 7 days   Lab Units 05/07/23  0537 05/06/23  0400 05/05/23 2000   SODIUM mmol/L 153* 147* 144   POTASSIUM mmol/L 3.1* 3.6 3.9   CHLORIDE mmol/L 123* 120* 114*   CO2 mmol/L 26.0 19.0* 25.0   BUN mg/dL 31* 52* 56*   CREATININE mg/dL 0.51* 0.62 0.83   CALCIUM mg/dL 8.3* 7.5* 7.8*   BILIRUBIN mg/dL 0.7  --  0.3   ALK PHOS U/L 51  --  53   ALT (SGPT) U/L 42*  --  54*   AST  (SGOT) U/L 39*  --  39*   GLUCOSE mg/dL 78 156* 145*       Results from last 7 days   Lab Units 05/07/23  1156 05/06/23  0839   INR  1.09 1.38*       Lab Results   Lab Value Date/Time    LIPASE 25 08/14/2020 0712    LIPASE 16 07/21/2019 2134       Radiology Review:  Imaging Results (Last 24 Hours)     ** No results found for the last 24 hours. **          Impression/Plan:  Patient Active Problem List   Diagnosis Code   • Septic shock A41.9, R65.21   • Right lower lobe pneumonia J18.9   • Intellectual disability F79   • Anemia D64.9   • Thrombocytopenia D69.6   • Transaminitis R74.01   • Hypoxia R09.02   • Metabolic encephalopathy G93.41     UGI bleed/secondary to gastric ulcer.  Status post epinephrine/Hemoclip with moderate success.  No further bleeding with stable Hgb.  Thrombocytopenia stable.  INR WNL.    Surgery evaluation appreciated.  Agree with vascular consult for possible coiling as prevention of rebleeding.    -Continue aggressive supportive care  -PPI  -Clears okay  -Consider relook EGD on Tuesday  -Check H. pylori antibody    Sherif Villegas MD  05/07/23  16:01 CDT

## 2023-05-07 NOTE — PROGRESS NOTES
[facility]  INPATIENT PROGRESS NOTE    Patient Name: Jess Ramsey  Date of Admission: 5/5/2023  Today's Date: 05/07/23  Length of Stay: 2  Primary Care Physician: David Frazier MD    Subjective   Chief Complaint: Altered mental status/pneumonia/GI    HPI   Patient remains nonverbal.  Patient does scream and yell.  Blood pressure stable, Levophed stopped yesterday.  Hyponatremia, change fluids to D5 water with 20 KCl.  Hypokalemia 20 mg potassium runs.  Hemoglobin stabilizing, still has black tarry stool but improving.  Thrombocytopenia, improving with 1 unit of platelets.  Change serial hemoglobin to every 8 hours.  Patient is on 2 L.    Review of Systems    Unable to obtain due to altered mental status.    All pertinent negatives and positives are as above. All other systems have been reviewed and are negative unless otherwise stated.     Objective    Temp:  [94.5 °F (34.7 °C)-98.8 °F (37.1 °C)] 97.7 °F (36.5 °C)  Heart Rate:  [] 82  Resp:  [13-27] 14  BP: ()/(37-96) 101/68  Physical Exam  Vitals and nursing note reviewed.   Constitutional:       Comments: Chronically ill.   Nonverbal  HENT:      Head: Normocephalic.   Eyes:      Conjunctiva/sclera: Conjunctivae normal.      Pupils: Pupils are equal, round, and reactive to light.   Neck:      Vascular: No JVD.   Cardiovascular:      Rate and Rhythm: Normal rate and regular rhythm.      Heart sounds: Normal heart sounds.   Pulmonary:      Effort: No respiratory distress.      Breath sounds: Rhonchi present. No wheezing or rales.      Comments: Patient is on 2 L of oxygen, rhonchi bilateral bases.  Chest:      Chest wall: No tenderness.   Abdominal:      General: Bowel sounds are normal. There is no distension.      Palpations: Abdomen is soft.      Tenderness: There is no abdominal tenderness.   Musculoskeletal:         General: No tenderness or deformity.      Cervical back: Neck supple.    Skin:     General: Skin is warm and dry.      Capillary Refill: Capillary refill takes 2 to 3 seconds.      Findings: No rash.   Neurological:      Cranial Nerves: No cranial nerve deficit.      Motor: Weakness present. No abnormal muscle tone.      Coordination: Coordination abnormal.      Gait: Gait abnormal.      Deep Tendon Reflexes: Reflexes normal.       Results Review:  I have reviewed the labs, radiology results, and diagnostic studies.    Laboratory Data:   Results from last 7 days   Lab Units 05/07/23  1156 05/07/23  0537 05/07/23  0010 05/06/23  0834 05/06/23  0400 05/05/23 2000   WBC 10*3/mm3  --  8.56  --   --  8.66 7.04   HEMOGLOBIN g/dL 9.7* 8.2* 8.7*   < > 10.0* 6.7*   HEMATOCRIT % 28.0* 23.4* 27.1*   < > 30.4* 19.4*   PLATELETS 10*3/mm3  --  84*  --   --  59* 80*    < > = values in this interval not displayed.        Results from last 7 days   Lab Units 05/07/23 0537 05/06/23  0400 05/05/23 2000   SODIUM mmol/L 153* 147* 144   POTASSIUM mmol/L 3.1* 3.6 3.9   CHLORIDE mmol/L 123* 120* 114*   CO2 mmol/L 26.0 19.0* 25.0   BUN mg/dL 31* 52* 56*   CREATININE mg/dL 0.51* 0.62 0.83   CALCIUM mg/dL 8.3* 7.5* 7.8*   BILIRUBIN mg/dL 0.7  --  0.3   ALK PHOS U/L 51  --  53   ALT (SGPT) U/L 42*  --  54*   AST (SGOT) U/L 39*  --  39*   GLUCOSE mg/dL 78 156* 145*       Culture Data:   Blood Culture   Date Value Ref Range Status   05/05/2023 No growth at 24 hours  Preliminary       Radiology Data:   Imaging Results (Last 24 Hours)     ** No results found for the last 24 hours. **          I have reviewed the patient's current medications.     Assessment/Plan   Assessment  Active Hospital Problems    Diagnosis    • **Septic shock    • Right lower lobe pneumonia    • Intellectual disability    • Anemia    • Thrombocytopenia    • Transaminitis    • Hypoxia    • Metabolic encephalopathy        Treatment Plan  HPI . Patient will be admitted at [facility].  She is transferred here from [facility] in [facility].  Her primary care provider had her in the hospital there for the last 2 days.  She presented with hypothermia and altered mental status.  He thought at first she was just dehydrated and then she ultimately developed a right lower lobe pneumonia and some hypoxemia after initially having clear chest imaging and a normal urinalysis.  She became hypotensive.  A central line was placed and she was started on norepinephrine and sent here.     Hypotension.  Resolved.  Levophed DC yesterday 5/6/2023.    Hypernatremia.  Change fluids to D5 water with 20 KCl.     Hypokalemia.  Lab in a.m. 20 mg IV potassium.     GI bleed.  Black tarry stool improving.  GI consult.  Serial hemoglobin and stat hemoglobin now.  Protonix drip.  I think the bleeding has been stopped.  EGD- 2 subcentimeter fusiform ulcers with overlying eschar and dark adherent clot- 2 clip was placed, epinephrine was injected, tattoo ink.  GI recommendation- possible EGD at the 72 hours.  General surgery recommendation- potentially could be coil by vascular.  Consult vascular in AM.      Anemia.  Status post 3 units of blood transfusion last night.  Hemoglobin 9.7 this morning.     Thrombocytopenia.    Status post 1 unit of platelets transfusion.  Platelet count today is 84.     Pneumonia/patient arrives somnolent/hypothermia/altered mental status . Zosyn.  Vancomycin.  Chest x-ray-  Right-sided CVL with tip overlying the low SVC,  No pneumothorax,  Veiling RIGHT basilar opacity, likely representing small layering pleural effusion and/or atelectasis.  Patient is currently on 2 L of oxygen    Elevated liver enzyme.  We will follow.     Buttock wound, stage II.  Wound care consult.       Dehydration . Ongoing IV hydration     History of stroke and cognitive deficit according to transfer physician.   Patient unable to provide any history.  Chronic right-sided weakness, nonverbal, patient does ambulate at baseline.     Patient from a  group home in [facility].     Nutrition . Speech evaluate      Deconditioning . PT and OT consult     Blood culture with SUSHIL-  no growth in 24 hours.  Legionella antigen-negative.  MRSA screen-negative.  Strep pneumo-negative.  UA ordered.     Patient transferred from [facility].   Patient is a ulloa of the [facility].  [phone]-[employer] [place] emergency contact, ulloa of [facility].   Patient lives in [facility].  At baseline patient get around a wheelchair, unable to use spoon to feed herself, she can  food with her hand and had to be spoon fed. Behavior wise baseline aggressive and yelling, this has been better with change in psych medications.      Palliative care consult on Monday     Medical Decision Making  Number and Complexity of problems: Hypotension/pneumonia/GI bleed/anemia/failure to thrive/buttock wound/hyponatremia  Differential Diagnosis: None.     Conditions and Status      Critical .     MDM Data  External documents reviewed: Previous note .  Cardiac tracing (EKG, telemetry) interpretation: Sinus .  Radiology interpretation: X-ray .  Labs reviewed: Laboratory  Any tests that were considered but not ordered: Lab now and in AM.     Decision rules/scores evaluated (example [id number], [place], etc): None     Discussed with: Nursing and patient.     Care Planning  Shared decision making: Nursing and patient  Code status and discussions: Full code.  [place]     Disposition  Social Determinants of Health that impact treatment or disposition: Patient is from a group home, [place] the state.  3 to 6 days.     Electronically signed by Gallo Hazel MD, 05/07/23, 12:39 CDT.

## 2023-05-07 NOTE — PLAN OF CARE
Goal Outcome Evaluation:  Plan of Care Reviewed With: patient        Progress: improving  Outcome Evaluation: Two small tarry stools this morning.  Alert, stable H/H and vital signs.  Protonix drip continued.  Vascular consult for am. May advance to clear liquid diet once speech evaluates.

## 2023-05-07 NOTE — PROGRESS NOTES
Laine Olsen MD Consult Note      Patient Care Team:  David Frazier MD as PCP - General (Family Medicine)    Chief complaint bleeding ulcer    Subjective .     History of present illness: Patient is a snow of the [facility] and nonverbal.  She was discovered to have a bleeding ulcer yesterday by GI and endoscopy.  It was clipped.  I was consulted for backup to be aware.  Her H&H is drifted down through the day yesterday.  She has had several what appeared to be old blood tarry stools.    Review of Systems  Unobtainable due to the patient's inability to communicate    History  Past Medical History:   Diagnosis Date   • Gastritis    • Hypertension    • Intellectual disability    • Psychosis    • Right hemiparesis    • Stroke    ,   Past Surgical History:   Procedure Laterality Date   • HIP SURGERY Right 2019   ,   Family History   Family history unknown: Yes   ,   Social History     Tobacco Use   • Smoking status: Unknown   Vaping Use   • Vaping Use: Never used   Substance Use Topics   • Alcohol use: Defer   • Drug use: Defer   ,   Medications Prior to Admission   Medication Sig Dispense Refill Last Dose   • acetaminophen (TYLENOL) 325 MG tablet Take 2 tablets by mouth Every 6 (Six) Hours As Needed for Mild Pain.      • albuterol (PROVENTIL) (2.5 MG/3ML) 0.083% nebulizer solution Take 2.5 mg by nebulization Every 4 (Four) Hours As Needed for Wheezing.      • amLODIPine (NORVASC) 5 MG tablet Take 1 tablet by mouth Daily.      • aspirin 81 MG chewable tablet Chew 1 tablet Daily.      • atorvastatin (LIPITOR) 10 MG tablet Take 1 tablet by mouth Every Night.      • Calcium Carbonate-Vitamin D 600-10 MG-MCG per tablet Take 1 tablet by mouth 2 (Two) Times a Day.      • carvedilol (COREG) 12.5 MG tablet Take 1 tablet by mouth 2 (Two) Times a Day With Meals.      • Loratadine 10 MG capsule Take 1 capsule by mouth Daily As Needed.      • multivitamins-minerals (PRESERVISION AREDS 2) capsule capsule Take 1 capsule by mouth 2  (Two) Times a Day.      • potassium chloride (K-DUR,KLOR-CON) 20 MEQ CR tablet Take 1 tablet by mouth Daily.      • risperiDONE (risperDAL) 0.5 MG tablet Take 1 tablet by mouth Daily.      • risperiDONE (risperDAL) 1 MG tablet Take 1 tablet by mouth 2 (Two) Times a Day.      • sennosides-docusate (PERICOLACE) 8.6-50 MG per tablet Take 2 tablets by mouth Every Night.      • traZODone (DESYREL) 50 MG tablet Take 25 mg by mouth Every Night.      • triamterene-hydrochlorothiazide (DYAZIDE) 37.5-25 MG per capsule Take 1 capsule by mouth Every Morning.      , Scheduled Meds:  piperacillin-tazobactam, 4.5 g, Intravenous, Q8H  sodium chloride, 10 mL, Intravenous, Q12H    , Continuous Infusions:  lactated ringers, 100 mL/hr, Last Rate: 100 mL/hr (05/07/23 0258)  norepinephrine, 0.02-0.3 mcg/kg/min, Last Rate: 0.18 mcg/kg/min (05/06/23 1430)  pantoprazole, 8 mg/hr, Last Rate: 8 mg/hr (05/07/23 0420)  Pharmacy to Dose Zosyn,     , PRN Meds:  •  acetaminophen **OR** acetaminophen **OR** acetaminophen  •  ipratropium-albuterol  •  Morphine  •  ondansetron  •  Pharmacy to Dose Zosyn  •  sodium chloride  •  sodium chloride  •  ziprasidone and Allergies:  Patient has no known allergies.    Objective     Vital Signs   Temp:  [94.5 °F (34.7 °C)-98.8 °F (37.1 °C)] 97.4 °F (36.3 °C)  Heart Rate:  [] 80  Resp:  [13-31] 13  BP: ()/(37-96) 126/70    Intake & Output (last 3 days)       05/04 0701  05/05 0700 05/05 0701  05/06 0700 05/06 0701  05/07 0700 05/07 0701  05/08 0700    I.V. (mL/kg)  1128 (18.8) 2650.8 (44.5)     Blood  600 553     IV Piggyback  1350 166     Total Intake(mL/kg)  3078 (51.3) 3369.8 (56.5)     Urine (mL/kg/hr)  930 1500 (1)     Stool   0     Total Output  930 1500     Net  +2148 +1869.8             Stool Unmeasured Occurrence   8 x            Physical Exam:     General Appearance:   Noncommunicable.  Able to arouse she is in a somewhat contracted state   Head:    Normocephalic, without obvious  abnormality, atraumatic   Eyes:            Lids and lashes normal, conjunctivae and sclerae normal, no   icterus, no pallor, corneas clear, PERRLA   Ears:    Ears appear intact with no abnormalities noted   Neck:   No adenopathy, supple, trachea midline   Back:     No kyphosis present, no scoliosis present, no skin lesions,      erythema or scars, no tenderness to percussion or                   palpation,  range of motion normal   Lungs:     Clear to auscultation,respirations regular, even and                  unlabored    Heart:    Regular rhythm and normal rate, normal S1 and S2, no            murmur, no gallop, no rub, no click   Chest Wall:    No abnormalities observed   Abdomen:    Nondistended nontender there is no incisions   Rectal:     Deferred   Extremities:   Moves all extremities well, no edema, no cyanosis, no             redness   Pulses:   Pulses palpable and equal bilaterally   Skin:   No bleeding, bruising or rash   Lymph nodes:   No palpable adenopathy   Neurologic:   No focal deficits       Lab Results (last 72 hours)     Procedure Component Value Units Date/Time    Comprehensive Metabolic Panel [[id number]]  (Abnormal) Collected: 05/07/23 0537    Specimen: Blood Updated: 05/07/23 0642     Glucose 78 mg/dL      BUN 31 mg/dL      Creatinine 0.51 mg/dL      Sodium 153 mmol/L      Potassium 3.1 mmol/L      Chloride 123 mmol/L      CO2 26.0 mmol/L      Calcium 8.3 mg/dL      Total Protein 4.0 g/dL      Albumin 2.4 g/dL      ALT (SGPT) 42 U/L      AST (SGOT) 39 U/L      Alkaline Phosphatase 51 U/L      Total Bilirubin 0.7 mg/dL      Globulin 1.6 gm/dL      A/G Ratio 1.5 g/dL      BUN/Creatinine Ratio 60.8     Anion Gap 4.0 mmol/L      eGFR 98.7 mL/min/1.73     Narrative:      GFR Normal >60  Chronic Kidney Disease <60  Kidney Failure <15    The GFR formula is only valid for adults with stable renal function between ages 18 and 70.    Lipid Panel [[id number]]  (Abnormal) Collected: 05/07/23 0537     Specimen: Blood Updated: 05/07/23 0642     Total Cholesterol 81 mg/dL      Triglycerides 89 mg/dL      HDL Cholesterol 26 mg/dL      LDL Cholesterol  37 mg/dL      VLDL Cholesterol 18 mg/dL      LDL/HDL Ratio 1.43    Narrative:      Cholesterol Reference Ranges  (U.S. Department of Health and Human Services ATP III Classifications)    Desirable          <200 mg/dL  Borderline High    200-239 mg/dL  High Risk          >240 mg/dL      Triglyceride Reference Ranges  (U.S. Department of Health and Human Services ATP III Classifications)    Normal           <150 mg/dL  Borderline High  150-199 mg/dL  High             200-499 mg/dL  Very High        >500 mg/dL    HDL Reference Ranges  (U.S. Department of Health and Human Services ATP III Classifications)    Low     <40 mg/dl (major risk factor for CHD)  High    >60 mg/dl ('negative' risk factor for CHD)        LDL Reference Ranges  (U.S. Department of Health and Human Services ATP III Classifications)    Optimal          <100 mg/dL  Near Optimal     100-129 mg/dL  Borderline High  130-159 mg/dL  High             160-189 mg/dL  Very High        >189 mg/dL    TSH [[id number]]  (Normal) Collected: 05/07/23 0537    Specimen: Blood Updated: 05/07/23 0642     TSH 4.050 uIU/mL     CBC & Differential [[id number]]  (Abnormal) Collected: 05/07/23 0537    Specimen: Blood Updated: 05/07/23 0603    Narrative:      The following orders were created for panel order CBC & Differential.  Procedure                               Abnormality         Status                     ---------                               -----------         ------                     CBC Auto Differential[[id number]]        Abnormal            Final result                 Please view results for these tests on the individual orders.    CBC Auto Differential [[id number]]  (Abnormal) Collected: 05/07/23 0537    Specimen: Blood Updated: 05/07/23 0603     WBC 8.56 10*3/mm3      RBC 2.76 10*6/mm3      Hemoglobin 8.2 g/dL       Hematocrit 23.4 %      MCV 84.8 fL      MCH 29.7 pg      MCHC 35.0 g/dL      RDW 14.2 %      RDW-SD 43.6 fl      MPV 11.5 fL      Platelets 84 10*3/mm3      Neutrophil % 76.6 %      Lymphocyte % 13.8 %      Monocyte % 7.7 %      Eosinophil % 0.9 %      Basophil % 0.2 %      Neutrophils, Absolute 6.55 10*3/mm3      Lymphocytes, Absolute 1.18 10*3/mm3      Monocytes, Absolute 0.66 10*3/mm3      Eosinophils, Absolute 0.08 10*3/mm3      Basophils, Absolute 0.02 10*3/mm3     Hemoglobin A1c [[id number]]  (Abnormal) Collected: 05/07/23 0537    Specimen: Blood Updated: 05/07/23 0603     Hemoglobin A1C 5.70 %     Narrative:      Hemoglobin A1C Ranges:    Increased Risk for Diabetes  5.7% to 6.4%  Diabetes                     >= 6.5%  Diabetic Goal                < 7.0%    Hemoglobin & Hematocrit, Blood [[id number]]  (Abnormal) Collected: 05/07/23 0010    Specimen: Blood Updated: 05/07/23 0018     Hemoglobin 8.7 g/dL      Hematocrit 27.1 %     Blood Culture With SUSHIL - Blood, Arm, Left [[id number]]  (Normal) Collected: 05/05/23 1959    Specimen: Blood from Arm, Left Updated: 05/06/23 2016     Blood Culture No growth at 24 hours    Hemoglobin & Hematocrit, Blood [[id number]]  (Abnormal) Collected: 05/06/23 1743    Specimen: Blood Updated: 05/06/23 1755     Hemoglobin 9.3 g/dL      Hematocrit 27.6 %     POC Glucose Once [[id number]]  (Abnormal) Collected: 05/06/23 1741    Specimen: Blood Updated: 05/06/23 1752     Glucose 139 mg/dL      Comment: : [id number] See Avendano ID: [id number]       Hemoglobin & Hematocrit, Blood [[id number]]  (Abnormal) Collected: 05/06/23 1224    Specimen: Blood Updated: 05/06/23 1236     Hemoglobin 7.8 g/dL      Hematocrit 23.4 %     Urinalysis, Microscopic Only - Indwelling Urethral Catheter [[id number]]  (Abnormal) Collected: 05/06/23 1000    Specimen: Urine from Indwelling Urethral Catheter Updated: 05/06/23 1020     RBC, UA 3-5 /HPF      WBC, UA 0-2 /HPF      Comment: Urine culture not  indicated.        Bacteria, UA None Seen /HPF      Squamous Epithelial Cells, UA 0-2 /HPF      Hyaline Casts, UA 0-2 /LPF      Methodology Automated Microscopy    Urinalysis With Culture If Indicated - Indwelling Urethral Catheter [[id number]]  (Abnormal) Collected: 05/06/23 1000    Specimen: Urine from Indwelling Urethral Catheter Updated: 05/06/23 1020     Color, UA Yellow     Appearance, UA Clear     pH, UA <=5.0     Specific Gravity, UA 1.022     Glucose, UA Negative     Ketones, UA Negative     Bilirubin, UA Negative     Blood, UA Small (1+)     Protein, UA Negative     Leuk Esterase, UA Negative     Nitrite, UA Negative     Urobilinogen, UA 0.2 E.U./dL    Narrative:      In absence of clinical symptoms, the presence of pyuria, bacteria, and/or nitrites on the urinalysis result does not correlate with infection.    aPTT [[id number]]  (Abnormal) Collected: 05/06/23 0839    Specimen: Blood Updated: 05/06/23 0856     PTT 39.0 seconds     Protime-INR [[id number]]  (Abnormal) Collected: 05/06/23 0839    Specimen: Blood Updated: 05/06/23 0856     Protime 17.2 Seconds      INR 1.38    Hemoglobin & Hematocrit, Blood [[id number]]  (Abnormal) Collected: 05/06/23 0834    Specimen: Blood Updated: 05/06/23 0846     Hemoglobin 9.2 g/dL      Hematocrit 27.1 %     Basic Metabolic Panel [[id number]]  (Abnormal) Collected: 05/06/23 0400    Specimen: Blood Updated: 05/06/23 0459     Glucose 156 mg/dL      BUN 52 mg/dL      Creatinine 0.62 mg/dL      Sodium 147 mmol/L      Potassium 3.6 mmol/L      Comment: Slight hemolysis detected by analyzer. Results may be affected.        Chloride 120 mmol/L      CO2 19.0 mmol/L      Calcium 7.5 mg/dL      BUN/Creatinine Ratio 83.9     Anion Gap 8.0 mmol/L      eGFR 94.2 mL/min/1.73     Narrative:      GFR Normal >60  Chronic Kidney Disease <60  Kidney Failure <15    The GFR formula is only valid for adults with stable renal function between ages 18 and 70.    CBC (No Diff) [[id number]]  (Abnormal)  Collected: 05/06/23 0400    Specimen: Blood Updated: 05/06/23 0458     WBC 8.66 10*3/mm3      RBC 3.51 10*6/mm3      Hemoglobin 10.0 g/dL      Hematocrit 30.4 %      MCV 86.6 fL      MCH 28.5 pg      MCHC 32.9 g/dL      RDW 13.9 %      RDW-SD 43.7 fl      MPV 13.4 fL      Platelets 59 10*3/mm3     MRSA Screen, PCR (Inpatient) - Swab, Nares [[id number]]  (Normal) Collected: 05/05/23 1946    Specimen: Swab from Nares Updated: 05/05/23 2138     MRSA PCR No MRSA Detected    Narrative:      The negative predictive value of this diagnostic test is high and should only be used to consider de-escalating anti-MRSA therapy. A positive result may indicate colonization with MRSA and must be correlated clinically.    Manual Differential [[id number]]  (Abnormal) Collected: 05/05/23 2000    Specimen: Blood Updated: 05/05/23 2127     Neutrophil % 72.6 %      Lymphocyte % 23.4 %      Monocyte % 2.4 %      Basophil % 1.6 %      Neutrophils Absolute 5.11 10*3/mm3      Lymphocytes Absolute 1.65 10*3/mm3      Monocytes Absolute 0.17 10*3/mm3      Basophils Absolute 0.11 10*3/mm3      nRBC 7.3 /100 WBC      Poikilocytes Slight/1+     Target Cells Slight/1+     WBC Morphology Normal     Platelet Estimate Decreased     Giant Platelets Large/3+    CBC Auto Differential [[id number]]  (Abnormal) Collected: 05/05/23 2000    Specimen: Blood Updated: 05/05/23 2127     WBC 7.04 10*3/mm3      RBC 2.32 10*6/mm3      Hemoglobin 6.7 g/dL      Hematocrit 19.4 %      MCV 83.6 fL      MCH 28.9 pg      MCHC 34.5 g/dL      RDW 15.0 %      RDW-SD 45.0 fl      MPV 12.4 fL      Platelets 80 10*3/mm3     Blood Gas, Arterial - [[id number]]  (Abnormal) Collected: 05/05/23 2103    Specimen: Arterial Blood Updated: 05/05/23 2101     Site Right Radial     Cayden's Test Positive     pH, Arterial 7.430 pH units      pCO2, Arterial 35.1 mm Hg      pO2, Arterial 92.1 mm Hg      HCO3, Arterial 23.3 mmol/L      Base Excess, Arterial -0.9 mmol/L      Comment: 84 Value below  "reference range        O2 Saturation, Arterial 98.4 %      Temperature 37.0 C      Barometric Pressure for Blood Gas 751 mmHg      Modality Nasal Cup     Flow Rate 2.0 lpm      Ventilator Mode NA     Collected by [id number]     Comment: Meter: [id number]     :  [id number]        pCO2, Temperature Corrected 35.1 mm Hg      pH, Temp Corrected 7.430 pH Units      pO2, Temperature Corrected 92.1 mm Hg     Procalcitonin [[id number]]  (Abnormal) Collected: 05/05/23 2000    Specimen: Blood Updated: 05/05/23 2042     Procalcitonin 0.27 ng/mL     Narrative:      As a Marker for Sepsis (Non-Neonates):    1. <0.5 ng/mL represents a low risk of severe sepsis and/or septic shock.  2. >2 ng/mL represents a high risk of severe sepsis and/or septic shock.    As a Marker for Lower Respiratory Tract Infections that require antibiotic therapy:    PCT on Admission    Antibiotic Therapy       6-12 Hrs later    >0.5                Strongly Recommended  >0.25 - <0.5        Recommended   0.1 - 0.25          Discouraged              Remeasure/reassess PCT  <0.1                Strongly Discouraged     Remeasure/reassess PCT    As 28 day mortality risk marker: \"Change in Procalcitonin Result\" (>80% or <=80%) if Day 0 (or Day 1) and Day 4 values are available. Refer to http://www.[facility]pct[facility]calculator.com    Change in PCT <=80%  A decrease of PCT levels below or equal to 80% defines a positive change in PCT test result representing a higher risk for 28-day all-cause mortality of patients diagnosed with severe sepsis for septic shock.    Change in PCT >80%  A decrease of PCT levels of more than 80% defines a negative change in PCT result representing a lower risk for 28-day all-cause mortality of patients diagnosed with severe sepsis or septic shock.       S. Pneumo Ag Urine or CSF - Urine, Urine, Clean Catch [[id number]]  (Normal) Collected: 05/05/23 1942    Specimen: Urine, Clean Catch Updated: 05/05/23 2035     Strep Pneumo Ag Negative "    Magnesium [[id number]]  (Normal) Collected: 05/05/23 2000    Specimen: Blood Updated: 05/05/23 2035     Magnesium 1.7 mg/dL     Comprehensive Metabolic Panel [[id number]]  (Abnormal) Collected: 05/05/23 2000    Specimen: Blood Updated: 05/05/23 2035     Glucose 145 mg/dL      BUN 56 mg/dL      Creatinine 0.83 mg/dL      Sodium 144 mmol/L      Potassium 3.9 mmol/L      Comment: Slight hemolysis detected by analyzer. Results may be affected.        Chloride 114 mmol/L      CO2 25.0 mmol/L      Calcium 7.8 mg/dL      Total Protein 3.9 g/dL      Albumin 2.1 g/dL      ALT (SGPT) 54 U/L      AST (SGOT) 39 U/L      Alkaline Phosphatase 53 U/L      Total Bilirubin 0.3 mg/dL      Globulin 1.8 gm/dL      A/G Ratio 1.2 g/dL      BUN/Creatinine Ratio 67.5     Anion Gap 5.0 mmol/L      eGFR 74.5 mL/min/1.73     Narrative:      GFR Normal >60  Chronic Kidney Disease <60  Kidney Failure <15    The GFR formula is only valid for adults with stable renal function between ages 18 and 70.    Legionella Antigen, Urine - Urine, Urine, Clean Catch [[id number]]  (Normal) Collected: 05/05/23 1942    Specimen: Urine, Clean Catch Updated: 05/05/23 2035     LEGIONELLA ANTIGEN, URINE Negative    Lactic Acid, Plasma [[id number]]  (Normal) Collected: 05/05/23 2000    Specimen: Blood Updated: 05/05/23 2033     Lactate 1.8 mmol/L     Phosphorus [[id number]]  (Abnormal) Collected: 05/05/23 2000    Specimen: Blood Updated: 05/05/23 2032     Phosphorus 2.1 mg/dL         Imaging Results (Last 72 Hours)     Procedure Component Value Units Date/Time    XR Chest 1 View [[id number]] Collected: 05/05/23 1959     Updated: 05/05/23 2003    Narrative:      EXAM/TECHNIQUE: XR CHEST 1 VW-     INDICATION: Line placement, pneumonia.     COMPARISON: None     FINDINGS:     Right-sided CVL with tip overlying the cavoatrial junction. No visible  pneumothorax. Hazy veiling RIGHT basilar opacity. LEFT lung and pleural  space are clear. Cardiac silhouette is normal size. No  acute osseous  finding.       Impression:         1.  Right-sided CVL with tip overlying the low SVC. No pneumothorax.  2.  Veiling RIGHT basilar opacity, likely representing small layering  pleural effusion and/or atelectasis.  This report was finalized on 05/05/2023 20:00 by Dr. Miguel Angel Grewal MD.                Assessment & Plan       Septic shock    Right lower lobe pneumonia    Intellectual disability    Anemia    Thrombocytopenia    Transaminitis    Hypoxia    Metabolic encephalopathy      I reviewed her blood work    I reviewed endoscopy.  These ulcers are in the mid to proximal body along the lesser curvature.    Plan bleeding gastric ulcers- support with blood products and platelets.  Check coags.  I discussed this very difficult situation with Dr. Hazel.  The location of these ulcers is difficult to treat surgically.  It is possible that a small little wedge resection can be performed but oftentimes it is more of a subtotal gastrectomy that is required in ideal settings.  Potentially this could be coiled by vascular.      Laine Olsen MD  05/07/23  08:45 CDT

## 2023-05-07 NOTE — PLAN OF CARE
Patient more alert. Eyes open spontaneously and nods head appropriately at times. Multiple liquid black/tarry stools overnight but appear to have slowed down in frequency and amount. Q6 H&H drawn. No pressors required this shift.  UOP remains adequate.   Problem: Skin Injury Risk Increased  Goal: Skin Health and Integrity  Outcome: Ongoing, Progressing  Intervention: Optimize Skin Protection  Recent Flowsheet Documentation  Taken 5/7/2023 0402 by Clemente Harris RN  Pressure Reduction Techniques: frequent weight shift encouraged  Head of Bed (HOB) Positioning: HOB at 30 degrees  Pressure Reduction Devices: pressure-redistributing mattress utilized  Skin Protection: tubing/devices free from skin contact  Taken 5/7/2023 0200 by Clemente Harris RN  Head of Bed (HOB) Positioning: HOB at 30 degrees  Taken 5/7/2023 0000 by Clemente Harris RN  Pressure Reduction Techniques: frequent weight shift encouraged  Head of Bed (HOB) Positioning: HOB at 30 degrees  Pressure Reduction Devices: pressure-redistributing mattress utilized  Skin Protection: tubing/devices free from skin contact  Taken 5/6/2023 2200 by Clemente Harris RN  Head of Bed (HOB) Positioning: HOB at 20 degrees  Taken 5/6/2023 2003 by Clemente Harris RN  Pressure Reduction Techniques: frequent weight shift encouraged  Head of Bed (HOB) Positioning: HOB at 30 degrees  Pressure Reduction Devices: pressure-redistributing mattress utilized  Skin Protection: tubing/devices free from skin contact     Problem: Fall Injury Risk  Goal: Absence of Fall and Fall-Related Injury  Outcome: Ongoing, Progressing  Intervention: Promote Injury-Free Environment  Recent Flowsheet Documentation  Taken 5/7/2023 0600 by Clemente Harris RN  Safety Promotion/Fall Prevention: safety round/check completed  Taken 5/7/2023 0500 by Clemente Harris RN  Safety Promotion/Fall Prevention: safety round/check completed  Taken 5/7/2023 0402 by Clemente Harris RN  Safety Promotion/Fall Prevention: safety round/check  completed  Taken 5/7/2023 0300 by Clemente Harris RN  Safety Promotion/Fall Prevention: safety round/check completed  Taken 5/7/2023 0200 by Clemente Harris RN  Safety Promotion/Fall Prevention: safety round/check completed  Taken 5/7/2023 0045 by Clemente Harris RN  Safety Promotion/Fall Prevention: safety round/check completed  Taken 5/7/2023 0000 by Clemente Harris RN  Safety Promotion/Fall Prevention: safety round/check completed  Taken 5/6/2023 2300 by Clemente Harris RN  Safety Promotion/Fall Prevention: safety round/check completed  Taken 5/6/2023 2200 by Clemente Harris RN  Safety Promotion/Fall Prevention: safety round/check completed  Taken 5/6/2023 2100 by Clemente Harris RN  Safety Promotion/Fall Prevention: safety round/check completed  Taken 5/6/2023 2003 by Clemente Harris RN  Safety Promotion/Fall Prevention: safety round/check completed  Taken 5/6/2023 1900 by Clemente Harris RN  Safety Promotion/Fall Prevention: safety round/check completed     Problem: Adult Inpatient Plan of Care  Goal: Plan of Care Review  Outcome: Ongoing, Progressing  Goal: Patient-Specific Goal (Individualized)  Outcome: Ongoing, Progressing  Goal: Absence of Hospital-Acquired Illness or Injury  Outcome: Ongoing, Progressing  Intervention: Identify and Manage Fall Risk  Recent Flowsheet Documentation  Taken 5/7/2023 0600 by Clemente Harris RN  Safety Promotion/Fall Prevention: safety round/check completed  Taken 5/7/2023 0500 by Clemente Harris RN  Safety Promotion/Fall Prevention: safety round/check completed  Taken 5/7/2023 0402 by Clemente Harris RN  Safety Promotion/Fall Prevention: safety round/check completed  Taken 5/7/2023 0300 by Clemente Harris RN  Safety Promotion/Fall Prevention: safety round/check completed  Taken 5/7/2023 0200 by Clemente Harris RN  Safety Promotion/Fall Prevention: safety round/check completed  Taken 5/7/2023 0045 by Clemente Harris RN  Safety Promotion/Fall Prevention: safety round/check completed  Taken 5/7/2023 0000 by  York, Cornejo, RN  Safety Promotion/Fall Prevention: safety round/check completed  Taken 5/6/2023 2300 by Clemente Harris RN  Safety Promotion/Fall Prevention: safety round/check completed  Taken 5/6/2023 2200 by Clemente Harris RN  Safety Promotion/Fall Prevention: safety round/check completed  Taken 5/6/2023 2100 by Clemente Harris RN  Safety Promotion/Fall Prevention: safety round/check completed  Taken 5/6/2023 2003 by Clemente Harris RN  Safety Promotion/Fall Prevention: safety round/check completed  Taken 5/6/2023 1900 by Clemente Harris RN  Safety Promotion/Fall Prevention: safety round/check completed  Intervention: Prevent Skin Injury  Recent Flowsheet Documentation  Taken 5/7/2023 0402 by Clemente Harris RN  Body Position:   turned   left  Skin Protection: tubing/devices free from skin contact  Taken 5/7/2023 0200 by Clemente Harris RN  Body Position:   turned   right  Taken 5/7/2023 0000 by Clemente Harris RN  Body Position:   turned   left  Skin Protection: tubing/devices free from skin contact  Taken 5/6/2023 2200 by Clemente Harris RN  Body Position: supine  Taken 5/6/2023 2003 by Clemente Harris RN  Body Position:   turned   right  Skin Protection: tubing/devices free from skin contact  Intervention: Prevent and Manage VTE (Venous Thromboembolism) Risk  Recent Flowsheet Documentation  Taken 5/7/2023 0402 by Clemente Harris RN  VTE Prevention/Management: sequential compression devices on  Taken 5/7/2023 0000 by Clemente Harris RN  VTE Prevention/Management:   sequential compression devices on   bilateral  Taken 5/6/2023 2003 by Clemente Harris RN  Activity Management: bedrest  VTE Prevention/Management:   bilateral   sequential compression devices on  Goal: Optimal Comfort and Wellbeing  Outcome: Ongoing, Progressing  Intervention: Monitor Pain and Promote Comfort  Recent Flowsheet Documentation  Taken 5/7/2023 0000 by Clemente Harris RN  Pain Management Interventions: (provider notified)   see MAR   other (see comments)  Taken  5/6/2023 2300 by Clemente Harris RN  Pain Management Interventions:   diversional activity provided   pillow support provided   quiet environment facilitated  Taken 5/6/2023 2003 by Clemente Harris RN  Pain Management Interventions:   pillow support provided   quiet environment facilitated  Goal: Readiness for Transition of Care  Outcome: Ongoing, Progressing     Problem: Fluid Imbalance (Pneumonia)  Goal: Fluid Balance  Outcome: Ongoing, Progressing     Problem: Infection (Pneumonia)  Goal: Resolution of Infection Signs and Symptoms  Outcome: Ongoing, Progressing     Problem: Respiratory Compromise (Pneumonia)  Goal: Effective Oxygenation and Ventilation  Outcome: Ongoing, Progressing  Intervention: Promote Airway Secretion Clearance  Recent Flowsheet Documentation  Taken 5/6/2023 2003 by Clemente Harris RN  Cough And Deep Breathing: unable to perform  Intervention: Optimize Oxygenation and Ventilation  Recent Flowsheet Documentation  Taken 5/7/2023 0402 by Clemente Harris RN  Head of Bed (HOB) Positioning: HOB at 30 degrees  Taken 5/7/2023 0200 by Clemente Harris RN  Head of Bed (HOB) Positioning: HOB at 30 degrees  Taken 5/7/2023 0000 by Clemente Harris RN  Head of Bed (HOB) Positioning: HOB at 30 degrees  Taken 5/6/2023 2200 by Clemente Harris RN  Head of Bed (HOB) Positioning: HOB at 20 degrees  Taken 5/6/2023 2003 by Clemente Harris RN  Head of Bed (HOB) Positioning: HOB at 30 degrees     Problem: Oral Intake Inadequate  Goal: Improved Oral Intake  Outcome: Ongoing, Progressing   Goal Outcome Evaluation:

## 2023-05-08 PROBLEM — D62 ACUTE BLOOD LOSS ANEMIA: Status: ACTIVE | Noted: 2023-05-05

## 2023-05-08 PROBLEM — R57.1 HYPOVOLEMIC SHOCK: Status: ACTIVE | Noted: 2023-05-05

## 2023-05-08 LAB
ALBUMIN SERPL-MCNC: 2.6 G/DL (ref 3.5–5.2)
ALBUMIN/GLOB SERPL: 1.2 G/DL
ALP SERPL-CCNC: 60 U/L (ref 39–117)
ALT SERPL W P-5'-P-CCNC: 48 U/L (ref 1–33)
ANION GAP SERPL CALCULATED.3IONS-SCNC: 7 MMOL/L (ref 5–15)
AST SERPL-CCNC: 40 U/L (ref 1–32)
BASOPHILS # BLD AUTO: 0.02 10*3/MM3 (ref 0–0.2)
BASOPHILS NFR BLD AUTO: 0.3 % (ref 0–1.5)
BILIRUB SERPL-MCNC: 0.6 MG/DL (ref 0–1.2)
BUN SERPL-MCNC: 18 MG/DL (ref 8–23)
BUN/CREAT SERPL: 40 (ref 7–25)
CALCIUM SPEC-SCNC: 8.5 MG/DL (ref 8.6–10.5)
CHLORIDE SERPL-SCNC: 117 MMOL/L (ref 98–107)
CO2 SERPL-SCNC: 26 MMOL/L (ref 22–29)
CREAT SERPL-MCNC: 0.45 MG/DL (ref 0.57–1)
DEPRECATED RDW RBC AUTO: 46.7 FL (ref 37–54)
EGFRCR SERPLBLD CKD-EPI 2021: 101.7 ML/MIN/1.73
EOSINOPHIL # BLD AUTO: 0.17 10*3/MM3 (ref 0–0.4)
EOSINOPHIL NFR BLD AUTO: 2.3 % (ref 0.3–6.2)
ERYTHROCYTE [DISTWIDTH] IN BLOOD BY AUTOMATED COUNT: 14.8 % (ref 12.3–15.4)
GLOBULIN UR ELPH-MCNC: 2.2 GM/DL
GLUCOSE SERPL-MCNC: 87 MG/DL (ref 65–99)
HCT VFR BLD AUTO: 28.4 % (ref 34–46.6)
HGB BLD-MCNC: 9.5 G/DL (ref 12–15.9)
LYMPHOCYTES # BLD AUTO: 0.79 10*3/MM3 (ref 0.7–3.1)
LYMPHOCYTES NFR BLD AUTO: 10.7 % (ref 19.6–45.3)
MCH RBC QN AUTO: 29 PG (ref 26.6–33)
MCHC RBC AUTO-ENTMCNC: 33.5 G/DL (ref 31.5–35.7)
MCV RBC AUTO: 86.6 FL (ref 79–97)
MONOCYTES # BLD AUTO: 0.47 10*3/MM3 (ref 0.1–0.9)
MONOCYTES NFR BLD AUTO: 6.4 % (ref 5–12)
NEUTROPHILS NFR BLD AUTO: 5.91 10*3/MM3 (ref 1.7–7)
NEUTROPHILS NFR BLD AUTO: 79.8 % (ref 42.7–76)
PLATELET # BLD AUTO: 77 10*3/MM3 (ref 140–450)
PMV BLD AUTO: 11.9 FL (ref 6–12)
POTASSIUM SERPL-SCNC: 3.3 MMOL/L (ref 3.5–5.2)
PROT SERPL-MCNC: 4.8 G/DL (ref 6–8.5)
QT INTERVAL: 328 MS
QTC INTERVAL: 405 MS
RBC # BLD AUTO: 3.28 10*6/MM3 (ref 3.77–5.28)
SODIUM SERPL-SCNC: 150 MMOL/L (ref 136–145)
WBC NRBC COR # BLD: 7.4 10*3/MM3 (ref 3.4–10.8)

## 2023-05-08 PROCEDURE — 25010000002 PIPERACILLIN SOD-TAZOBACTAM PER 1 G: Performed by: FAMILY MEDICINE

## 2023-05-08 PROCEDURE — 92610 EVALUATE SWALLOWING FUNCTION: CPT | Performed by: SPEECH-LANGUAGE PATHOLOGIST

## 2023-05-08 PROCEDURE — 25810000003 DEXTROSE 5 % WITH KCL 20 MEQ 20 MEQ/L SOLUTION: Performed by: FAMILY MEDICINE

## 2023-05-08 PROCEDURE — 99222 1ST HOSP IP/OBS MODERATE 55: CPT | Performed by: SURGERY

## 2023-05-08 PROCEDURE — 99222 1ST HOSP IP/OBS MODERATE 55: CPT | Performed by: CLINICAL NURSE SPECIALIST

## 2023-05-08 PROCEDURE — 80053 COMPREHEN METABOLIC PANEL: CPT | Performed by: FAMILY MEDICINE

## 2023-05-08 PROCEDURE — 99231 SBSQ HOSP IP/OBS SF/LOW 25: CPT | Performed by: SPECIALIST

## 2023-05-08 PROCEDURE — 25010000002 PIPERACILLIN SOD-TAZOBACTAM PER 1 G: Performed by: INTERNAL MEDICINE

## 2023-05-08 PROCEDURE — 85025 COMPLETE CBC W/AUTO DIFF WBC: CPT | Performed by: FAMILY MEDICINE

## 2023-05-08 RX ORDER — POTASSIUM CHLORIDE 750 MG/1
40 CAPSULE, EXTENDED RELEASE ORAL ONCE
Status: DISCONTINUED | OUTPATIENT
Start: 2023-05-08 | End: 2023-05-09

## 2023-05-08 RX ADMIN — Medication 10 ML: at 09:16

## 2023-05-08 RX ADMIN — Medication 1 APPLICATION: at 09:16

## 2023-05-08 RX ADMIN — PANTOPRAZOLE SODIUM 8 MG/HR: 40 INJECTION, POWDER, FOR SOLUTION INTRAVENOUS at 22:35

## 2023-05-08 RX ADMIN — TAZOBACTAM SODIUM AND PIPERACILLIN SODIUM 4.5 G: 500; 4 INJECTION, SOLUTION INTRAVENOUS at 03:49

## 2023-05-08 RX ADMIN — POTASSIUM CHLORIDE AND DEXTROSE MONOHYDRATE 50 ML/HR: 150; 5 INJECTION, SOLUTION INTRAVENOUS at 12:27

## 2023-05-08 RX ADMIN — CHLORHEXIDINE GLUCONATE 1 APPLICATION: 500 CLOTH TOPICAL at 03:49

## 2023-05-08 RX ADMIN — COLLAGENASE SANTYL 1 APPLICATION: 250 OINTMENT TOPICAL at 20:57

## 2023-05-08 RX ADMIN — PANTOPRAZOLE SODIUM 8 MG/HR: 40 INJECTION, POWDER, FOR SOLUTION INTRAVENOUS at 12:27

## 2023-05-08 RX ADMIN — TAZOBACTAM SODIUM AND PIPERACILLIN SODIUM 4.5 G: 500; 4 INJECTION, SOLUTION INTRAVENOUS at 12:30

## 2023-05-08 RX ADMIN — Medication 1 APPLICATION: at 20:45

## 2023-05-08 RX ADMIN — PANTOPRAZOLE SODIUM 8 MG/HR: 40 INJECTION, POWDER, FOR SOLUTION INTRAVENOUS at 06:54

## 2023-05-08 RX ADMIN — TAZOBACTAM SODIUM AND PIPERACILLIN SODIUM 4.5 G: 500; 4 INJECTION, SOLUTION INTRAVENOUS at 20:40

## 2023-05-08 NOTE — CONSULTS
Jess Ramsey  [id number]  [id number]  I004/1  Gallo Hazel MD  5/5/2023    CC: Upper GI bleed with bleeding gastric ulcer    HPI: The patient is a 72-year-old -American female who lives in a group home in [place].  She is a snow of the [facility].  She has had a prior stroke with cognitive deficit.  She was admitted to our facility on 5/3/2023 with hypotension and hypothermia.  During work-up, she was found to have a right lower lobe pneumonia.  Since her admission she started having large melanotic stools.  She underwent EGD with control of the bleeding gastric ulcer near the lesser curve of the stomach.  She has been evaluated by general surgery as well.      Past Medical History:   Diagnosis Date   • Gastritis    • Hypertension    • Intellectual disability    • Psychosis    • Right hemiparesis    • Stroke        Past Surgical History:   Procedure Laterality Date   • ENDOSCOPY N/A 5/6/2023    Procedure: ESOPHAGOGASTRODUODENOSCOPY WITH ANESTHESIA;  Surgeon: Sherif Villegas MD;  Location: [facility];  Service: Gastroenterology;  Laterality: N/A;  PRE GI BLEED  POST gastric ulcers,ink and clip   • HIP SURGERY Right 2019       Family History   Family history unknown: Yes       Social History     Socioeconomic History   • Marital status: Single   Tobacco Use   • Smoking status: Unknown   Vaping Use   • Vaping Use: Never used   Substance and Sexual Activity   • Alcohol use: Defer   • Drug use: Defer   • Sexual activity: Defer       No Known Allergies    Hospital Medications (active)       Dose Frequency Start End    acetaminophen (TYLENOL) 160 MG/5ML solution 650 mg 650 mg Every 4 Hours PRN 5/5/2023     Admin Instructions: Based on patient request - if ordered for moderate or severe pain, provider allows for administration of a medication prescribed for a lower pain scale.  Do not exceed 4 grams of acetaminophen in a 24 hr period. Max dose of 2gm for AST/ALT greater than 120 units/L    If given for fever, use  fever parameter: fever greater than 100.4 °F.    If given for pain, use the following pain scale:   Mild Pain = Pain Score of 1-3, CPOT 1-2  Moderate Pain = Pain Score of 4-6, CPOT 3-4  Severe Pain = Pain Score of 7-10, CPOT 5-8    Route: Oral    Linked Group 1: See Hyperspace for full Linked Orders Report.        acetaminophen (TYLENOL) suppository 650 mg 650 mg Every 4 Hours PRN 5/5/2023     Admin Instructions: Based on patient request - if ordered for moderate or severe pain, provider allows for administration of a medication prescribed for a lower pain scale.  Do not exceed 4 grams of acetaminophen in a 24 hr period. Max dose of 2gm for AST/ALT greater than 120 units/L    If given for fever, use fever parameter: fever greater than 100.4 °F.    If given for pain, use the following pain scale:   Mild Pain = Pain Score of 1-3, CPOT 1-2  Moderate Pain = Pain Score of 4-6, CPOT 3-4  Severe Pain = Pain Score of 7-10, CPOT 5-8    Route: Rectal    Linked Group 1: See Hyperspace for full Linked Orders Report.        acetaminophen (TYLENOL) tablet 650 mg 650 mg Every 4 Hours PRN 5/5/2023     Admin Instructions: Based on patient request - if ordered for moderate or severe pain, provider allows for administration of a medication prescribed for a lower pain scale.  Do not exceed 4 grams of acetaminophen in a 24 hr period. Max dose of 2gm for AST/ALT greater than 120 units/L    If given for fever, use fever parameter: fever greater than 100.4 °F.    If given for pain, use the following pain scale:   Mild Pain = Pain Score of 1-3, CPOT 1-2  Moderate Pain = Pain Score of 4-6, CPOT 3-4  Severe Pain = Pain Score of 7-10, CPOT 5-8    Route: Oral    Linked Group 1: See Hyperspace for full Linked Orders Report.        Chlorhexidine Gluconate Cloth 2 % pads 1 application 1 application Every 24 Hours 5/8/2023     Admin Instructions: Use for admission bath and continue for length of critical care stay.    Route: Topical    dextrose 5 %  with KCl 20 mEq/L infusion 50 mL/hr Continuous 5/7/2023     Route: Intravenous    ipratropium-albuterol (DUO-NEB) nebulizer solution 3 mL 3 mL Every 6 Hours PRN 5/6/2023     Admin Instructions: Include Respiratory Treatment Education    Route: Nebulization    Morphine sulfate (PF) injection 1 mg 1 mg Every 4 Hours PRN 5/7/2023 5/14/2023    Admin Instructions: Based on patient request - if ordered for moderate or severe pain, provider allows for administration of a medication prescribed for a lower pain scale.      Caution: Look alike/sound alike drug alert    If given for pain, use the following pain scale:  Mild Pain = Pain Score of 1-3, CPOT 1-2  Moderate Pain = Pain Score of 4-6, CPOT 3-4  Severe Pain = Pain Score of 7-10, CPOT 5-8    Route: Intravenous    mupirocin (BACTROBAN) 2 % nasal ointment 1 application 1 application 2 Times Daily 5/7/2023 5/12/2023    Admin Instructions: Begin on day 1 of ICU admission and continue for 5 days OR until critical care discharge (if prior to 5 days).      Route: Each Nare    ondansetron (ZOFRAN) injection 4 mg 4 mg Every 6 Hours PRN 5/5/2023     Admin Instructions: If BOTH ondansetron (ZOFRAN) and promethazine (PHENERGAN) are ordered use ondansetron first and THEN promethazine IF ondansetron is ineffective.    Route: Intravenous    pantoprazole (PROTONIX) 40 mg in 100mL NS IVPB 8 mg/hr Continuous 5/6/2023     Admin Instructions: Break seal and mix to activate vial before use    Route: Intravenous    Pharmacy to Dose Zosyn  Continuous PRN 5/5/2023 5/8/2023    Route: Does not apply    piperacillin-tazobactam (ZOSYN) 4.5 g in iso-osmotic dextrose 100 mL IVPB (premix) 4.5 g Every 8 Hours 5/6/2023 5/9/2023    Admin Instructions: Refrigerate    Route: Intravenous    sodium chloride 0.9 % flush 10 mL 10 mL Every 12 Hours Scheduled 5/5/2023     Route: Intravenous    sodium chloride 0.9 % flush 10 mL 10 mL As Needed 5/5/2023     Route: Intravenous    sodium chloride 0.9 % infusion 40  "mL 40 mL As Needed 5/5/2023     Admin Instructions: Following administration of an IV intermittent medication, flush line with 40mL NS at 100mL/hr.    Route: Intravenous    ziprasidone (GEODON) injection 10 mg 10 mg Every 12 Hours PRN 5/7/2023     Admin Instructions: DO NOT administer IV. Max dose 40 mg/day.  Group 2 (Pink) Hazardous Drug - Reproductive Risk Only - See Handling Guide    Route: Intramuscular          Review of Systems   Unable to perform ROS: Dementia   Constitutional: Negative.    HENT: Negative.    Eyes: Negative.    Respiratory: Negative.    Cardiovascular: Negative.    Gastrointestinal: Negative.    Endocrine: Negative.    Genitourinary: Negative.    Musculoskeletal: Negative.    Skin: Negative.    Allergic/Immunologic: Negative.    Neurological: Negative.    Hematological: Negative.    Psychiatric/Behavioral: Negative.        /84   Pulse 81   Temp 97.7 °F (36.5 °C) (Oral)   Resp 20   Ht 154.9 cm (61\")   Wt 59.6 kg (131 lb 6.4 oz)   SpO2 96%   BMI 24.83 kg/m²     Physical Exam  Vitals and nursing note reviewed.   Constitutional:       Appearance: She is well-developed.   HENT:      Head: Normocephalic and atraumatic.   Eyes:      General: No scleral icterus.     Pupils: Pupils are equal, round, and reactive to light.   Neck:      Thyroid: No thyromegaly.      Vascular: No carotid bruit or JVD.   Cardiovascular:      Rate and Rhythm: Normal rate and regular rhythm.      Pulses:           Carotid pulses are 2+ on the right side and 2+ on the left side.       Femoral pulses are 2+ on the right side and 2+ on the left side.       Popliteal pulses are 2+ on the right side and 2+ on the left side.        Dorsalis pedis pulses are 2+ on the right side and 2+ on the left side.        Posterior tibial pulses are 2+ on the right side and 2+ on the left side.      Heart sounds: Normal heart sounds.   Pulmonary:      Effort: Pulmonary effort is normal.      Breath sounds: Normal breath sounds. "   Abdominal:      General: Bowel sounds are normal. There is no distension or abdominal bruit.      Palpations: Abdomen is soft. There is no mass.      Tenderness: There is no abdominal tenderness.   Musculoskeletal:         General: Normal range of motion.      Cervical back: Neck supple.   Lymphadenopathy:      Cervical: No cervical adenopathy.   Skin:     General: Skin is warm and dry.   Neurological:      Mental Status: She is oriented to person, place, and time.      Cranial Nerves: No cranial nerve deficit.      Sensory: No sensory deficit.         Laboratory Data:  Results from last 7 days   Lab Units 05/08/23  0401 05/07/23  1547 05/07/23  1156 05/07/23  0537 05/06/23  0834 05/06/23  0400   WBC 10*3/mm3 7.40  --   --  8.56  --  8.66   HEMOGLOBIN g/dL 9.5* 9.7* 9.7* 8.2*   < > 10.0*   HEMATOCRIT % 28.4* 28.7* 28.0* 23.4*   < > 30.4*   PLATELETS 10*3/mm3 77*  --   --  84*  --  59*    < > = values in this interval not displayed.       Results from last 7 days   Lab Units 05/08/23  0455 05/07/23  0537 05/06/23  0400 05/05/23  2000   SODIUM mmol/L 150* 153* 147* 144   POTASSIUM mmol/L 3.3* 3.1* 3.6 3.9   CHLORIDE mmol/L 117* 123* 120* 114*   CO2 mmol/L 26.0 26.0 19.0* 25.0   BUN mg/dL 18 31* 52* 56*   CREATININE mg/dL 0.45* 0.51* 0.62 0.83   CALCIUM mg/dL 8.5* 8.3* 7.5* 7.8*   BILIRUBIN mg/dL 0.6 0.7  --  0.3   ALK PHOS U/L 60 51  --  53   ALT (SGPT) U/L 48* 42*  --  54*   AST (SGOT) U/L 40* 39*  --  39*   GLUCOSE mg/dL 87 78 156* 145*     Results from last 7 days   Lab Units 05/07/23  1156 05/06/23  0839   INR  1.09 1.38*   APTT seconds  --  39.0*         Diagnostic Data:  Imaging Results (Last 24 Hours)     ** No results found for the last 24 hours. **          Impression:    Septic shock    Right lower lobe pneumonia    Intellectual disability    Anemia    Thrombocytopenia    Transaminitis    Hypoxia    Metabolic encephalopathy    Acute blood loss anemia    Hypovolemic shock      Plan: After thoroughly  evaluating Jess Ramsey, I believe the best course of action is to remain conservative from a vascular surgery standpoint.  Currently her H&H have remained stable.  She will undergo EGD tomorrow by Dr. Villegas for reevaluation of this area.  If she does have continued bleeding or worsening H&H/hypotension then coil embolization can be considered.  This was discussed with both general surgery and GI.  Thank you for allowing me to see Jess Ramsey in consult. Please feel free to reach out with any questions or concerns.    Monster Mora,   5/8/2023  13:01 CDT

## 2023-05-08 NOTE — PROGRESS NOTES
[facility] Gastroenterology  Inpatient Progress Note  Today's date:  05/08/23    Jess Ramsey  1950       Reason for Follow Up:    1.  Upper GI bleed  2.  Gastric ulcer    Subjective:   Patient much more docile today.  Minimal dark stool overnight.    No Known Allergies    Current Facility-Administered Medications:   •  acetaminophen (TYLENOL) tablet 650 mg, 650 mg, Oral, Q4H PRN **OR** acetaminophen (TYLENOL) 160 MG/5ML solution 650 mg, 650 mg, Oral, Q4H PRN **OR** acetaminophen (TYLENOL) suppository 650 mg, 650 mg, Rectal, Q4H PRN, Gallo Hazel MD  •  Chlorhexidine Gluconate Cloth 2 % pads 1 application, 1 application, Topical, Q24H, Gallo Hazel MD, 1 application at 05/08/23 0349  •  collagenase ointment 1 application, 1 application, Topical, Q12H, Estrella Valdez APRN  •  dextrose 5 % with KCl 20 mEq/L infusion, 75 mL/hr, Intravenous, Continuous, Gallo Hazel MD, Last Rate: 75 mL/hr at 05/08/23 1418, 75 mL/hr at 05/08/23 1418  •  ipratropium-albuterol (DUO-NEB) nebulizer solution 3 mL, 3 mL, Nebulization, Q6H PRN, Gallo Hazel MD  •  Morphine sulfate (PF) injection 1 mg, 1 mg, Intravenous, Q4H PRN, Gallo Hazel MD, 1 mg at 05/07/23 0016  •  mupirocin (BACTROBAN) 2 % nasal ointment 1 application, 1 application, Each Nare, BID, Gallo Hazel MD, 1 application at 05/08/23 0916  •  ondansetron (ZOFRAN) injection 4 mg, 4 mg, Intravenous, Q6H PRN, Gallo Hazel MD  •  pantoprazole (PROTONIX) 40 mg in 100mL NS IVPB, 8 mg/hr, Intravenous, Continuous, Gallo Hazel MD, Last Rate: 20 mL/hr at 05/08/23 1227, 8 mg/hr at 05/08/23 1227  •  Pharmacy to Dose Zosyn, , Does not apply, Continuous PRN, Gallo Hazel MD  •  piperacillin-tazobactam (ZOSYN) 4.5 g in iso-osmotic dextrose 100 mL IVPB (premix), 4.5 g, Intravenous, Q8H, Gallo Hazel MD, 4.5 g at 05/08/23 1230  •  potassium chloride (MICRO-K) CR capsule 40 mEq, 40 mEq, Oral, Once, Gallo Hazel MD  •  sodium chloride 0.9 %  flush 10 mL, 10 mL, Intravenous, Q12H, Gallo Hazel MD, 10 mL at 05/08/23 0916  •  sodium chloride 0.9 % flush 10 mL, 10 mL, Intravenous, PRN, Gallo Hazel MD  •  sodium chloride 0.9 % infusion 40 mL, 40 mL, Intravenous, PRN, Gallo Hazel MD  •  ziprasidone (GEODON) injection 10 mg, 10 mg, Intramuscular, Q12H PRN, Gallo Hazel MD, 10 mg at 05/07/23 0258    Review of Systems:   Review of Systems   All other systems reviewed and are negative.  Unable to obtain due to mental status; patient minimally verbal; difficult to communicate  Vital Signs:  Temp:  [96.9 °F (36.1 °C)-97.7 °F (36.5 °C)] 97.6 °F (36.4 °C)  Heart Rate:  [75-84] 79  Resp:  [17-22] 18  BP: ()/() 135/72  Body mass index is 24.83 kg/m².     Intake/Output Summary (Last 24 hours) at 5/8/2023 1830  Last data filed at 5/8/2023 1200  Gross per 24 hour   Intake 1531.6 ml   Output 655 ml   Net 876.6 ml     I/O this shift:  In: 600.6 [I.V.:485.6; IV Piggyback:115]  Out: 300 [Urine:300]  Physical Exam:  Physical Exam  CV-RRR  Lung-good expansion bilaterally  ABD-benign      Results Review:   I have reviewed all of the patient's current test results    Results from last 7 days   Lab Units 05/08/23  0401 05/07/23  1547 05/07/23  1156 05/07/23  0537 05/06/23  0834 05/06/23  0400   WBC 10*3/mm3 7.40  --   --  8.56  --  8.66   HEMOGLOBIN g/dL 9.5* 9.7* 9.7* 8.2*   < > 10.0*   HEMATOCRIT % 28.4* 28.7* 28.0* 23.4*   < > 30.4*   PLATELETS 10*3/mm3 77*  --   --  84*  --  59*    < > = values in this interval not displayed.       Results from last 7 days   Lab Units 05/08/23  0455 05/07/23  0537 05/06/23  0400 05/05/23 2000   SODIUM mmol/L 150* 153* 147* 144   POTASSIUM mmol/L 3.3* 3.1* 3.6 3.9   CHLORIDE mmol/L 117* 123* 120* 114*   CO2 mmol/L 26.0 26.0 19.0* 25.0   BUN mg/dL 18 31* 52* 56*   CREATININE mg/dL 0.45* 0.51* 0.62 0.83   CALCIUM mg/dL 8.5* 8.3* 7.5* 7.8*   BILIRUBIN mg/dL 0.6 0.7  --  0.3   ALK PHOS U/L 60 51  --  53   ALT (SGPT) U/L  48* 42*  --  54*   AST (SGOT) U/L 40* 39*  --  39*   GLUCOSE mg/dL 87 78 156* 145*       Results from last 7 days   Lab Units 05/07/23  1156 05/06/23  0839   INR  1.09 1.38*       Lab Results   Lab Value Date/Time    LIPASE 25 08/14/2020 0712    LIPASE 16 07/21/2019 2134       Radiology Review:  Imaging Results (Last 24 Hours)     ** No results found for the last 24 hours. **          Impression/Plan:  Patient Active Problem List   Diagnosis Code   • Septic shock A41.9, R65.21   • Right lower lobe pneumonia J18.9   • Intellectual disability F79   • Anemia D64.9   • Thrombocytopenia D69.6   • Transaminitis R74.01   • Hypoxia R09.02   • Metabolic encephalopathy G93.41   • Acute blood loss anemia D62   • Hypovolemic shock R57.1     UGI bleed/secondary to gastric ulcer.  Status post epinephrine/Hemoclip with moderate success.  No further bleeding with stable Hgb.       Vascular surgery evaluation appreciated     -Continue aggressive supportive care  -PPI  -Advance to full liquids  -Relook EGD tomorrow  -Follow-up H. pylori antibody    Sherif Villegas MD  05/08/23  18:30 CDT

## 2023-05-08 NOTE — PLAN OF CARE
Goal Outcome Evaluation:  Plan of Care Reviewed With: other (see comments)        Progress: improving  Outcome Evaluation: Transferred to 4B room 449. VSS. Dark stools x2 today.  Nurse, VINAY Baker @ bedside.

## 2023-05-08 NOTE — PROGRESS NOTES
[facility] Medicine Services  INPATIENT PROGRESS NOTE    Patient Name: Jess Ramsey  Date of Admission: 5/5/2023  Today's Date: 05/08/23  Length of Stay: 3  Primary Care Physician: David Frazier MD    Subjective   Chief Complaint:Altered mental status/pneumonia/GI    HPI   Patient is on 2 L of oxygen.  Patient remained nonverbal but does moan and shake her head, blood pressure is on the high side, afebrile.  Hypernatremia, improving, increase D5 normal saline 20 KCl's.  Hypokalemia, p.o. potassium.  Elevated liver enzyme, stable.  Anemia, hemoglobin stable, no sign of acute bleed.  Thrombocytopenia, decrease in platelets.    Plan to transfer patient to the floor.    Review of Systems   Unable to obtain due to difficult to communicate, patient is unable to talk, chronic condition.  All pertinent negatives and positives are as above. All other systems have been reviewed and are negative unless otherwise stated.     Objective    Temp:  [96.9 °F (36.1 °C)-98.7 °F (37.1 °C)] 97.7 °F (36.5 °C)  Heart Rate:  [75-83] 81  Resp:  [16-26] 20  BP: ()/() 150/84  Physical Exam  Vitals and nursing note reviewed.   Constitutional:       Comments: Chronically ill.   Nonverbal  HENT:      Head: Normocephalic.   Eyes:      Conjunctiva/sclera: Conjunctivae normal.      Pupils: Pupils are equal, round, and reactive to light.   Neck:      Vascular: No JVD.   Cardiovascular:      Rate and Rhythm: Normal rate and regular rhythm.      Heart sounds: Normal heart sounds.   Pulmonary:      Effort: No respiratory distress.      Breath sounds: Rhonchi present. No wheezing or rales.      Comments: Patient is on 2 L of oxygen, rhonchi bilateral bases.  Chest:      Chest wall: No tenderness.   Abdominal:      General: Bowel sounds are normal. There is no distension.      Palpations: Abdomen is soft.      Tenderness: There is no abdominal tenderness.   Musculoskeletal:         General: No  tenderness or deformity.      Cervical back: Neck supple.  Arthritis of the hand with deformity.  Skin:     General: Skin is warm and dry.      Capillary Refill: Capillary refill takes 2 to 3 seconds.      Findings: No rash.   Neurological:      Cranial Nerves: No cranial nerve deficit.      Motor: Weakness present. No abnormal muscle tone.      Coordination: Coordination abnormal.      Gait: Gait abnormal.      Deep Tendon Reflexes: Reflexes normal.       Results Review:  I have reviewed the labs, radiology results, and diagnostic studies.    Laboratory Data:   Results from last 7 days   Lab Units 05/08/23  0401 05/07/23  1547 05/07/23  1156 05/07/23  0537 05/06/23  0834 05/06/23  0400   WBC 10*3/mm3 7.40  --   --  8.56  --  8.66   HEMOGLOBIN g/dL 9.5* 9.7* 9.7* 8.2*   < > 10.0*   HEMATOCRIT % 28.4* 28.7* 28.0* 23.4*   < > 30.4*   PLATELETS 10*3/mm3 77*  --   --  84*  --  59*    < > = values in this interval not displayed.        Results from last 7 days   Lab Units 05/08/23  0455 05/07/23  0537 05/06/23  0400 05/05/23  2000   SODIUM mmol/L 150* 153* 147* 144   POTASSIUM mmol/L 3.3* 3.1* 3.6 3.9   CHLORIDE mmol/L 117* 123* 120* 114*   CO2 mmol/L 26.0 26.0 19.0* 25.0   BUN mg/dL 18 31* 52* 56*   CREATININE mg/dL 0.45* 0.51* 0.62 0.83   CALCIUM mg/dL 8.5* 8.3* 7.5* 7.8*   BILIRUBIN mg/dL 0.6 0.7  --  0.3   ALK PHOS U/L 60 51  --  53   ALT (SGPT) U/L 48* 42*  --  54*   AST (SGOT) U/L 40* 39*  --  39*   GLUCOSE mg/dL 87 78 156* 145*       Culture Data:   Blood Culture   Date Value Ref Range Status   05/05/2023 No growth at 2 days  Preliminary       Radiology Data:   Imaging Results (Last 24 Hours)     ** No results found for the last 24 hours. **          I have reviewed the patient's current medications.     Assessment/Plan   Assessment  Active Hospital Problems    Diagnosis    • **Septic shock    • Right lower lobe pneumonia    • Intellectual disability    • Anemia    • Thrombocytopenia    • Transaminitis    •  Hypoxia    • Metabolic encephalopathy    • Acute blood loss anemia    • Hypovolemic shock        Treatment Plan  HPI . Patient will be admitted at [facility].  She is transferred here from [facility] in [facility].  Her primary care provider had her in the hospital there for the last 2 days.  She presented with hypothermia and altered mental status.  He thought at first she was just dehydrated and then she ultimately developed a right lower lobe pneumonia and some hypoxemia after initially having clear chest imaging and a normal urinalysis.  She became hypotensive.  A central line was placed and she was started on norepinephrine and sent here.     Hypotension.  Resolved.  Levophed DC yesterday 5/6/2023.     Hypernatremia.  Change fluids to D5 water with 20 KCl.      Hypokalemia.  Lab in a.m. 20 mg IV potassium.     GI bleed.  Black tarry stool improving.  GI consult.  Serial hemoglobin and stat hemoglobin now.  Protonix drip.  I think the bleeding has been stopped.  EGD- 2 subcentimeter fusiform ulcers with overlying eschar and dark adherent clot- 2 clip was placed, epinephrine was injected, tattoo ink.  GI recommendation- possible EGD at the 72 hours.  General surgery recommendation- potentially could be coil by vascular.  Vascular recommendation- conservative treatment.  If continued bleeding or worsening of hemoglobin and hypotension then to consider coil embolization.    Anemia.  Status post 3 units of blood transfusion last night.  Hemoglobin  9.5, stable.     Thrombocytopenia.    Status post 1 unit of platelets transfusion.  Platelet count today is  77, decreased.     Pneumonia/patient arrives somnolent/hypothermia/altered mental status . Zosyn.  Vancomycin.  Chest x-ray-  Right-sided CVL with tip overlying the low SVC,  No pneumothorax,  Veiling RIGHT basilar opacity, likely representing small layering pleural effusion and/or atelectasis.  Patient is currently on 2 L of  oxygen.    Hyponatremia.  Improving.  Increase D5 free water with 20 KCl.    Hypokalemia.  P.o. potassium.     Elevated liver enzyme.    Stable.     Buttock wound, stage II.  Wound care consult.      History of stroke and cognitive deficit according to transfer physician.   Patient unable to provide any history.  Chronic right-sided weakness, nonverbal, patient does ambulate at baseline.     Patient from a group home in [facility].     Nutrition .  Full liquid diet.     Deconditioning . PT and OT consult     Blood culture with SUSHIL-  no growth in 2 days.  Legionella antigen-negative.  MRSA screen-negative.  Strep pneumo-negative.  UA ordered.     Patient transferred from [facility].   Patient is a ulloa of [facility].  [phone] Hamilton emergency contact, [facility].   Patient lives in [facility].  At baseline patient get around a wheelchair, unable to use spoon to feed herself, she can  food with her hand and had to be spoon fed. Behavior wise baseline aggressive and yelling, this has been better with change in psych medications.      Palliative care consult . Consult  for nursing home placement.     Medical Decision Making  Number and Complexity of problems: Hypotension/pneumonia/GI bleed/anemia/failure to thrive/buttock wound/hyponatremia  Differential Diagnosis: None.     Conditions and Status        Improving.     [facility] Data  External documents reviewed: Previous note .  Cardiac tracing (EKG, telemetry) interpretation: Sinus .  Radiology interpretation: X-ray .  Labs reviewed: Laboratory  Any tests that were considered but not ordered: Lab now and in AM.     Decision rules/scores evaluated (example [id number], Wells, etc): None     Discussed with: Nursing and patient.     Care Planning  Shared decision making: Nursing and patient  Code status and discussions: Full code.  [place]     Disposition  Social  Determinants of Health that impact treatment or disposition: Patient is from a group home, snow of the [facility].  3 to 6 days.     Electronically signed by Gallo Hazel MD, 05/08/23, 14:01 CDT.

## 2023-05-08 NOTE — CONSULTS
[facility] Palliative Care Services    Palliative Care Initial Consult   Attending Physician: Gallo Hazel MD  Referring Provider: Gallo Hazel MD    Reason for Referral: assistance with clarification of goals of care and assistance with withdrawal of life prolonging interventions  Family/Support: guardian/group home    Code Status and Medical Interventions:   Ordered at: 05/05/23 1910     Code Status (Patient has no pulse and is not breathing):    CPR (Attempt to Resuscitate)     Medical Interventions (Patient has pulse or is breathing):    Full Support     Comments:    [place] from [place]     Goals of Care: TBD.    HPI:   72 y.o. female has a past medical history of prior stroke with right hemiparesis, intellectual disability, hypertension, and psychosis.  Patient a resident of [facility] in [place], [place].  Patient is a snow of the [facility] in [place]. Patient presented to [facility] as a transfer from [facility] in [place] on 5/5/2023 related to hypotension.  According to chart review patient initially admitted at transferring facility on 5/3 with findings of hypothermia and altered mental status.  She was treated with IV fluids and warmed without significant improvement.  Respiratory decline requiring oxygen with findings of right lower lobe pneumonia per chest x-ray.  Developed hypotension and started on norepinephrine and transferred to this facility for higher level of care.  Additional diagnostics obtained and given a dose of vancomycin and Zosyn.  GI consulted due to melena drop in hemoglobin 6.7.  She received 2 units of PRBCs.  She was noted to have thrombocytopenia and received a unit of platelets and a dose of DDAVP.  She was started on Protonix drip.  She underwent stat EGD by Dr. Villegas on 5/6 with findings of bleeding fusiform ulcers requiring clips and plans to potentially reevaluate with EGD and 72 hours (Tuesday). Surgery consulted due to concerns of  potential re-bleeding, tough given location would be difficult to treat surgically, though could potentially be coiled by vascular surgery.  Levophed was able to be weaned off on 5/6.  Hemoglobin appears stable and currently 9.5 from 8.2 yesterday.  Hypernatremia improving now 150 from 153 while potassium improving 3.3 from 3.1.  Vascular surgery consult pending.  Speech to advance diet per nursing.  Sitting up in bed without apparent distress.  Garbled speech.  Unable to provide historical health information for complex decision-making.  No family/guardian at bedside.     Review of Systems   Unable to perform ROS: acuity of condition     1- Pain Assessment  CPOT Facial Expression: 0-->relaxed, neutral  CPOT Body Movements: 0-->absence of movements  CPOT Muscle Tension: 0-->relaxed  Ventilator Compliance/Vocalization: 0-->tolerating ventilator or movement  CPOT Score: 0    Past Medical History:   Diagnosis Date   • Gastritis    • Hypertension    • Intellectual disability    • Psychosis    • Right hemiparesis    • Stroke      Past Surgical History:   Procedure Laterality Date   • HIP SURGERY Right 2019     Social History     Socioeconomic History   • Marital status: Single   Tobacco Use   • Smoking status: Unknown   Vaping Use   • Vaping Use: Never used   Substance and Sexual Activity   • Alcohol use: Defer   • Drug use: Defer   • Sexual activity: Defer       Current Facility-Administered Medications   Medication Dose Route Frequency Provider Last Rate Last Admin   • acetaminophen (TYLENOL) tablet 650 mg  650 mg Oral Q4H PRN Sherif Villegas MD        Or   • acetaminophen (TYLENOL) 160 MG/5ML solution 650 mg  650 mg Oral Q4H PRN Sherif Villegas MD        Or   • acetaminophen (TYLENOL) suppository 650 mg  650 mg Rectal Q4H PRN Sherif Villegas MD       • Chlorhexidine Gluconate Cloth 2 % pads 1 application  1 application Topical Q24H Gallo Hazel MD   1 application at 05/08/23 0349   • dextrose 5 % with KCl 20 mEq/L  infusion  50 mL/hr Intravenous Continuous Gallo Hazel MD 50 mL/hr at 05/07/23 1353 50 mL/hr at 05/07/23 1353   • ipratropium-albuterol (DUO-NEB) nebulizer solution 3 mL  3 mL Nebulization Q6H PRN Sherif Villegas MD       • Morphine sulfate (PF) injection 1 mg  1 mg Intravenous Q4H PRN David Laird, DO   1 mg at 05/07/23 0016   • mupirocin (BACTROBAN) 2 % nasal ointment 1 application  1 application Each Nare BID Gallo Hazel MD   1 application at 05/07/23 2116   • ondansetron (ZOFRAN) injection 4 mg  4 mg Intravenous Q6H PRN Sherif Villegas MD       • pantoprazole (PROTONIX) 40 mg in 100mL NS IVPB  8 mg/hr Intravenous Continuous Sherif Villegas MD 20 mL/hr at 05/08/23 0654 8 mg/hr at 05/08/23 0654   • Pharmacy to Dose Zosyn   Does not apply Continuous PRN Sherif Villegas MD       • piperacillin-tazobactam (ZOSYN) 4.5 g in iso-osmotic dextrose 100 mL IVPB (premix)  4.5 g Intravenous Q8H Sherif Villegas MD   4.5 g at 05/08/23 0349   • sodium chloride 0.9 % flush 10 mL  10 mL Intravenous Q12H Sherif Villegas MD   10 mL at 05/07/23 2116   • sodium chloride 0.9 % flush 10 mL  10 mL Intravenous PRN Sherif Villegas MD       • sodium chloride 0.9 % infusion 40 mL  40 mL Intravenous PRN Sherif Villegas MD       • ziprasidone (GEODON) injection 10 mg  10 mg Intramuscular Q12H PRN David Laird DO   10 mg at 05/07/23 0258     dextrose 5 % with KCl 20 mEq, 50 mL/hr, Last Rate: 50 mL/hr (05/07/23 1353)  pantoprazole, 8 mg/hr, Last Rate: 8 mg/hr (05/08/23 0654)  Pharmacy to Dose Zosyn,       •  acetaminophen **OR** acetaminophen **OR** acetaminophen  •  ipratropium-albuterol  •  Morphine  •  ondansetron  •  Pharmacy to Dose Zosyn  •  sodium chloride  •  sodium chloride  •  ziprasidone    No Known Allergies  Attest that current medications reviewed  including but not limited to prescriptions, over-the counter, herbals and vitamin/mineral/dietary (nutritional) supplements for name, route of administration, type, dose  "and frequency and are current using all immediate resources available at time of dictation.      Intake/Output Summary (Last 24 hours) at 5/8/2023 0806  Last data filed at 5/8/2023 0352  Gross per 24 hour   Intake 2264 ml   Output 830 ml   Net 1434 ml       Physical Exam:    Diagnostics: Reviewed  /89 (Patient Position: Lying)   Pulse 81   Temp 96.9 °F (36.1 °C) (Axillary)   Resp 20   Ht 154.9 cm (61\")   Wt 59.6 kg (131 lb 6.4 oz)   SpO2 98%   BMI 24.83 kg/m²     Vitals and nursing note reviewed.   Constitutional:       Appearance: Ill-appearing and chronically ill-appearing.      Interventions: Nasal cannula in place.   Neck:      Comments: Right IJ  Pulmonary:      Effort: Pulmonary effort is normal.      Breath sounds: Decreased breath sounds present.   Cardiovascular:      Normal rate.   Edema:     Peripheral edema present.  Abdominal:      Palpations: Abdomen is soft.   Musculoskeletal:      Cervical back: Neck supple. Skin:     General: Skin is warm.      Comments: Wounds as document   Genitourinary:     Comments: Bauer catheter in place  Neurological:      Mental Status: Alert.   Psychiatric:      Comments: Garbled speech, intellectual disability     Patient status: Disease state: Controlled with current treatments.  Functional status: Palliative Performance Scale Score: Performance 30% based on the following measures: Ambulation: Totally bed bound, Activity and Evidence of Disease: Unable to do any work, extensive evidence of disease, Self-Care: Total care required,  Intake: Reduced, LOC: Full, drowsy or confusion   Nutritional status: Albumin 2.1 Body mass index is 24.83 kg/m².         Hospital Problem List      Septic shock    Right lower lobe pneumonia    Intellectual disability    Anemia    Thrombocytopenia    Transaminitis    Hypoxia    Metabolic encephalopathy    Impression/Problem List:    1. Septic shock  2. Upper GI bleed secondary to gastric ulcer  3. Acute blood loss " anemia  4. Thrombocytopenia  5. Right lower lobe pneumonia  6. Transaminitis  7. Hypoxia  8. Previous CVA with with right hemiparesis  9. Intellectual disability  10. Hypertension, baseline  11. Psychosis  12. Hypoalbuminemia   13. Dysphagia    Recommendations/Plan:  1. plan: Goals of care include CODE STATUS CPR/full support interventions.    Family support: The patient receives support from her Guardian/group home..  Advance Directives: Advance Directive Status: Patient does not have advance directive   POA/Healthcare surrogate-Macrina Basim-guardian.    2.  Palliative care encounter  - Prognosis is guarded long-term secondary to septic shock, upper GI bleed secondary to gastric ulcer, acute blood loss anemia, thrombocytopenia, pneumonia, history of CVA, and comorbidities.    -resident of [facility] in [place], [place].  -snow of the [facility] in [place].     -treated with IV fluids and warmed without significant improvement at outlying facility.  Respiratory decline requiring oxygen with findings of right lower lobe pneumonia per chest x-ray.  Developed hypotension and started on norepinephrine and transferred to this facility for higher level of care. Received a dose of vancomycin and Zosyn.    5/6-GI consulted due to melena drop in hemoglobin 6.7.  She received 2 units of PRBCs.  She was noted to have thrombocytopenia and received a unit of platelets and a dose of DDAVP.  She was started on Protonix drip.  She underwent stat EGD by Dr. Villegas on 5/6 with findings of bleeding fusiform ulcers requiring clips and plans to potentially reevaluate with EGD and 72 hours (Tuesday).   -Surgery consulted due to concerns of potential re-bleeding, tough given location would be difficult to treat surgically, though could potentially be coiled by vascular surgery.    5/6-Levophed was able to be weaned off on 5/6.    5/8-Hemoglobin appears stable and currently 9.5 from 8.2 yesterday.    -Vascular surgery consult  pending.    5/8-Patient does not currently have a terminal condition or permanently unconscious. Patient does not currently have comorbid conditions in which two (2) or more coexisting medical conditions compromise the chance of recovery or of benefiting from active treatment. Should patient experience an illness or injury or for which there is no reasonable prospect of a cure or recovery, that ultimately will cause the snow's death if life-sustaining treatment is initiated or continued, and that imposes severe pain otherwise imposes an inhumane burden on the snow, or continuing life-sustaining treatment provides only minimal medical benefit this would warrant further re-evaluation for potential de-escalation of medical priorities.  -Would recommend to continue current aggressive interventions and monitor effect/outcome for now.       Thank you for this consult and allowing us to participate in patient's plan of care. Palliative Care Team will continue to follow patient.     Ely Hardin, JOSE  5/8/2023  08:06 CDT

## 2023-05-08 NOTE — H&P (VIEW-ONLY)
General acute hospital Gastroenterology  Inpatient Progress Note  Today's date:  05/08/23    Jess Ramsey  1950       Reason for Follow Up:    1.  Upper GI bleed  2.  Gastric ulcer    Subjective:   Patient much more docile today.  Minimal dark stool overnight.    No Known Allergies    Current Facility-Administered Medications:   •  acetaminophen (TYLENOL) tablet 650 mg, 650 mg, Oral, Q4H PRN **OR** acetaminophen (TYLENOL) 160 MG/5ML solution 650 mg, 650 mg, Oral, Q4H PRN **OR** acetaminophen (TYLENOL) suppository 650 mg, 650 mg, Rectal, Q4H PRN, Gallo Hazel MD  •  Chlorhexidine Gluconate Cloth 2 % pads 1 application, 1 application, Topical, Q24H, Gallo Hazel MD, 1 application at 05/08/23 0349  •  collagenase ointment 1 application, 1 application, Topical, Q12H, Estrella Valdez APRN  •  dextrose 5 % with KCl 20 mEq/L infusion, 75 mL/hr, Intravenous, Continuous, Gallo Hazel MD, Last Rate: 75 mL/hr at 05/08/23 1418, 75 mL/hr at 05/08/23 1418  •  ipratropium-albuterol (DUO-NEB) nebulizer solution 3 mL, 3 mL, Nebulization, Q6H PRN, Gallo Hazel MD  •  Morphine sulfate (PF) injection 1 mg, 1 mg, Intravenous, Q4H PRN, Gallo Hazel MD, 1 mg at 05/07/23 0016  •  mupirocin (BACTROBAN) 2 % nasal ointment 1 application, 1 application, Each Nare, BID, Gallo Hazel MD, 1 application at 05/08/23 0916  •  ondansetron (ZOFRAN) injection 4 mg, 4 mg, Intravenous, Q6H PRN, Gallo Hazel MD  •  pantoprazole (PROTONIX) 40 mg in 100mL NS IVPB, 8 mg/hr, Intravenous, Continuous, Gallo Hazel MD, Last Rate: 20 mL/hr at 05/08/23 1227, 8 mg/hr at 05/08/23 1227  •  Pharmacy to Dose Zosyn, , Does not apply, Continuous PRN, Gallo Hazel MD  •  piperacillin-tazobactam (ZOSYN) 4.5 g in iso-osmotic dextrose 100 mL IVPB (premix), 4.5 g, Intravenous, Q8H, Gallo Hazel MD, 4.5 g at 05/08/23 1230  •  potassium chloride (MICRO-K) CR capsule 40 mEq, 40 mEq, Oral, Once, Gallo Hazel MD  •  sodium chloride 0.9 %  flush 10 mL, 10 mL, Intravenous, Q12H, Gallo Hazel MD, 10 mL at 05/08/23 0916  •  sodium chloride 0.9 % flush 10 mL, 10 mL, Intravenous, PRN, Gallo Hazel MD  •  sodium chloride 0.9 % infusion 40 mL, 40 mL, Intravenous, PRN, Gallo Hazel MD  •  ziprasidone (GEODON) injection 10 mg, 10 mg, Intramuscular, Q12H PRN, Gallo Hazel MD, 10 mg at 05/07/23 0258    Review of Systems:   Review of Systems   All other systems reviewed and are negative.  Unable to obtain due to mental status; patient minimally verbal; difficult to communicate  Vital Signs:  Temp:  [96.9 °F (36.1 °C)-97.7 °F (36.5 °C)] 97.6 °F (36.4 °C)  Heart Rate:  [75-84] 79  Resp:  [17-22] 18  BP: ()/() 135/72  Body mass index is 24.83 kg/m².     Intake/Output Summary (Last 24 hours) at 5/8/2023 1830  Last data filed at 5/8/2023 1200  Gross per 24 hour   Intake 1531.6 ml   Output 655 ml   Net 876.6 ml     I/O this shift:  In: 600.6 [I.V.:485.6; IV Piggyback:115]  Out: 300 [Urine:300]  Physical Exam:  Physical Exam  CV-RRR  Lung-good expansion bilaterally  ABD-benign      Results Review:   I have reviewed all of the patient's current test results    Results from last 7 days   Lab Units 05/08/23  0401 05/07/23  1547 05/07/23  1156 05/07/23  0537 05/06/23  0834 05/06/23  0400   WBC 10*3/mm3 7.40  --   --  8.56  --  8.66   HEMOGLOBIN g/dL 9.5* 9.7* 9.7* 8.2*   < > 10.0*   HEMATOCRIT % 28.4* 28.7* 28.0* 23.4*   < > 30.4*   PLATELETS 10*3/mm3 77*  --   --  84*  --  59*    < > = values in this interval not displayed.       Results from last 7 days   Lab Units 05/08/23  0455 05/07/23  0537 05/06/23  0400 05/05/23 2000   SODIUM mmol/L 150* 153* 147* 144   POTASSIUM mmol/L 3.3* 3.1* 3.6 3.9   CHLORIDE mmol/L 117* 123* 120* 114*   CO2 mmol/L 26.0 26.0 19.0* 25.0   BUN mg/dL 18 31* 52* 56*   CREATININE mg/dL 0.45* 0.51* 0.62 0.83   CALCIUM mg/dL 8.5* 8.3* 7.5* 7.8*   BILIRUBIN mg/dL 0.6 0.7  --  0.3   ALK PHOS U/L 60 51  --  53   ALT (SGPT) U/L  48* 42*  --  54*   AST (SGOT) U/L 40* 39*  --  39*   GLUCOSE mg/dL 87 78 156* 145*       Results from last 7 days   Lab Units 05/07/23  1156 05/06/23  0839   INR  1.09 1.38*       Lab Results   Lab Value Date/Time    LIPASE 25 08/14/2020 0712    LIPASE 16 07/21/2019 2134       Radiology Review:  Imaging Results (Last 24 Hours)     ** No results found for the last 24 hours. **          Impression/Plan:  Patient Active Problem List   Diagnosis Code   • Septic shock A41.9, R65.21   • Right lower lobe pneumonia J18.9   • Intellectual disability F79   • Anemia D64.9   • Thrombocytopenia D69.6   • Transaminitis R74.01   • Hypoxia R09.02   • Metabolic encephalopathy G93.41   • Acute blood loss anemia D62   • Hypovolemic shock R57.1     UGI bleed/secondary to gastric ulcer.  Status post epinephrine/Hemoclip with moderate success.  No further bleeding with stable Hgb.       Vascular surgery evaluation appreciated     -Continue aggressive supportive care  -PPI  -Advance to full liquids  -Relook EGD tomorrow  -Follow-up H. pylori antibody    Sherif Villegas MD  05/08/23  18:30 CDT

## 2023-05-08 NOTE — PLAN OF CARE
Patient had 2 black tarry stools overnight much smaller than prior shift. H and H stable. VSS. No pressors required. Plan for speech eval today.   Problem: Skin Injury Risk Increased  Goal: Skin Health and Integrity  5/8/2023 0643 by Clemente Harris RN  Outcome: Ongoing, Progressing  5/8/2023 0643 by Clemente Harris RN  Outcome: Ongoing, Progressing  Intervention: Optimize Skin Protection  Recent Flowsheet Documentation  Taken 5/8/2023 0402 by Clemente Harris RN  Pressure Reduction Techniques: weight shift assistance provided  Head of Bed (HOB) Positioning: HOB at 30 degrees  Pressure Reduction Devices: pressure-redistributing mattress utilized  Skin Protection: tubing/devices free from skin contact  Taken 5/8/2023 0200 by Clemente Harris RN  Head of Bed (HOB) Positioning: HOB at 30 degrees  Taken 5/8/2023 0008 by Clemente Harris RN  Head of Bed (HOB) Positioning: HOB at 30 degrees  Taken 5/7/2023 2200 by Clemente Harris RN  Head of Bed (HOB) Positioning: HOB at 30 degrees  Taken 5/7/2023 2000 by Clemente Harris RN  Pressure Reduction Techniques: weight shift assistance provided  Head of Bed (HOB) Positioning: HOB at 30 degrees  Pressure Reduction Devices: pressure-redistributing mattress utilized  Skin Protection: tubing/devices free from skin contact     Problem: Fall Injury Risk  Goal: Absence of Fall and Fall-Related Injury  5/8/2023 0643 by Clemente Harris RN  Outcome: Ongoing, Progressing  5/8/2023 0643 by Clemente Harris RN  Outcome: Ongoing, Progressing  Intervention: Promote Injury-Free Environment  Recent Flowsheet Documentation  Taken 5/8/2023 0402 by Clemente Harris RN  Safety Promotion/Fall Prevention: safety round/check completed  Taken 5/8/2023 0300 by Clemente Harris RN  Safety Promotion/Fall Prevention: safety round/check completed  Taken 5/8/2023 0200 by Clemente Harris RN  Safety Promotion/Fall Prevention: safety round/check completed  Taken 5/8/2023 0100 by Clemente Harris RN  Safety Promotion/Fall Prevention: safety  round/check completed  Taken 5/8/2023 0008 by Clemente Harris RN  Safety Promotion/Fall Prevention: safety round/check completed  Taken 5/7/2023 2300 by Clemente Harris RN  Safety Promotion/Fall Prevention: safety round/check completed  Taken 5/7/2023 2200 by Clemente Harris RN  Safety Promotion/Fall Prevention: safety round/check completed  Taken 5/7/2023 2100 by Clemente Harris RN  Safety Promotion/Fall Prevention: safety round/check completed  Taken 5/7/2023 2000 by Clemente Harris RN  Safety Promotion/Fall Prevention: safety round/check completed  Taken 5/7/2023 1900 by Clemente Harris RN  Safety Promotion/Fall Prevention: safety round/check completed     Problem: Adult Inpatient Plan of Care  Goal: Plan of Care Review  5/8/2023 0643 by Clemente Harris RN  Outcome: Ongoing, Progressing  5/8/2023 0643 by Clemente Harris RN  Outcome: Ongoing, Progressing  Goal: Patient-Specific Goal (Individualized)  5/8/2023 0643 by Clemente Harris RN  Outcome: Ongoing, Progressing  5/8/2023 0643 by Clemente Harris RN  Outcome: Ongoing, Progressing  Goal: Absence of Hospital-Acquired Illness or Injury  5/8/2023 0643 by Clemente Harris RN  Outcome: Ongoing, Progressing  5/8/2023 0643 by Clemente Harris RN  Outcome: Ongoing, Progressing  Intervention: Identify and Manage Fall Risk  Recent Flowsheet Documentation  Taken 5/8/2023 0402 by Clemente Harris RN  Safety Promotion/Fall Prevention: safety round/check completed  Taken 5/8/2023 0300 by Clemente Harris RN  Safety Promotion/Fall Prevention: safety round/check completed  Taken 5/8/2023 0200 by Clemente Harris RN  Safety Promotion/Fall Prevention: safety round/check completed  Taken 5/8/2023 0100 by Clemente Harris RN  Safety Promotion/Fall Prevention: safety round/check completed  Taken 5/8/2023 0008 by Clemente Harris RN  Safety Promotion/Fall Prevention: safety round/check completed  Taken 5/7/2023 2300 by Clemente Harris RN  Safety Promotion/Fall Prevention: safety round/check completed  Taken 5/7/2023 2200 by  York, Cornejo, RN  Safety Promotion/Fall Prevention: safety round/check completed  Taken 5/7/2023 2100 by Clemente Harris RN  Safety Promotion/Fall Prevention: safety round/check completed  Taken 5/7/2023 2000 by Clemente Harris RN  Safety Promotion/Fall Prevention: safety round/check completed  Taken 5/7/2023 1900 by Clemetne Harris RN  Safety Promotion/Fall Prevention: safety round/check completed  Intervention: Prevent Skin Injury  Recent Flowsheet Documentation  Taken 5/8/2023 0402 by Clemente Harris RN  Body Position:   turned   right  Skin Protection: tubing/devices free from skin contact  Taken 5/8/2023 0200 by Clemente Harris RN  Body Position:   turned   left  Taken 5/8/2023 0008 by Clemente Harris RN  Body Position:   turned   right  Taken 5/7/2023 2200 by Clemente Harris RN  Body Position:   turned   left  Taken 5/7/2023 2000 by Clemente Harris RN  Body Position:   turned   right  Skin Protection: tubing/devices free from skin contact  Intervention: Prevent and Manage VTE (Venous Thromboembolism) Risk  Recent Flowsheet Documentation  Taken 5/8/2023 0402 by Clemente Harris RN  Activity Management: bedrest  VTE Prevention/Management: sequential compression devices on  Taken 5/8/2023 0008 by Clemente Harris RN  VTE Prevention/Management: sequential compression devices on  Taken 5/7/2023 2000 by Clemente Harris RN  Activity Management: bedrest  VTE Prevention/Management: sequential compression devices on  Goal: Optimal Comfort and Wellbeing  5/8/2023 0643 by Clemente Harris RN  Outcome: Ongoing, Progressing  5/8/2023 0643 by Clemente Harris RN  Outcome: Ongoing, Progressing  Goal: Readiness for Transition of Care  5/8/2023 0643 by Clemente Harris RN  Outcome: Ongoing, Progressing  5/8/2023 0643 by Clemente Harris RN  Outcome: Ongoing, Progressing     Problem: Fluid Imbalance (Pneumonia)  Goal: Fluid Balance  5/8/2023 0643 by Clemente Harris RN  Outcome: Ongoing, Progressing  5/8/2023 0643 by Clemente Harris RN  Outcome: Ongoing, Progressing      Problem: Infection (Pneumonia)  Goal: Resolution of Infection Signs and Symptoms  5/8/2023 0643 by Clemente Harris RN  Outcome: Ongoing, Progressing  5/8/2023 0643 by Clemente Harris RN  Outcome: Ongoing, Progressing     Problem: Respiratory Compromise (Pneumonia)  Goal: Effective Oxygenation and Ventilation  5/8/2023 0643 by Clemente Harris RN  Outcome: Ongoing, Progressing  5/8/2023 0643 by Clemente Harris RN  Outcome: Ongoing, Progressing  Intervention: Promote Airway Secretion Clearance  Recent Flowsheet Documentation  Taken 5/8/2023 0402 by Clemente Harris RN  Cough And Deep Breathing: unable to perform  Taken 5/8/2023 0008 by Clemente Harris RN  Cough And Deep Breathing: unable to perform  Taken 5/7/2023 2000 by Clemente Harris RN  Cough And Deep Breathing: unable to perform  Intervention: Optimize Oxygenation and Ventilation  Recent Flowsheet Documentation  Taken 5/8/2023 0402 by Clemente Harris RN  Head of Bed (HOB) Positioning: HOB at 30 degrees  Taken 5/8/2023 0200 by Clemente Harris RN  Head of Bed (HOB) Positioning: HOB at 30 degrees  Taken 5/8/2023 0008 by Clemente Harris RN  Head of Bed (HOB) Positioning: HOB at 30 degrees  Taken 5/7/2023 2200 by Clemente Harris RN  Head of Bed (HOB) Positioning: HOB at 30 degrees  Taken 5/7/2023 2000 by Clemente Harris RN  Head of Bed (HOB) Positioning: HOB at 30 degrees     Problem: Oral Intake Inadequate  Goal: Improved Oral Intake  5/8/2023 0643 by Clemente Harris RN  Outcome: Ongoing, Progressing  5/8/2023 0643 by Clemente Harris RN  Outcome: Ongoing, Progressing   Goal Outcome Evaluation:

## 2023-05-08 NOTE — PROGRESS NOTES
Laine Olsen MD Progress Note     LOS: 3 days   Patient Care Team:  David Frazier MD as PCP - General (Family Medicine)        Subjective     She is a little more active today.  Still not verbal    Objective     Vital Signs  Temp:  [96.9 °F (36.1 °C)-98.7 °F (37.1 °C)] 97.7 °F (36.5 °C)  Heart Rate:  [75-83] 81  Resp:  [15-26] 20  BP: ()/() 150/84    Intake & Output (last 3 days)       05/05 0701  05/06 0700 05/06 0701  05/07 0700 05/07 0701 05/08 0700 05/08 0701  05/09 0700    I.V. (mL/kg) 1128 (18.8) 2650.8 (44.5) 1858 (31.2) 485.6 (8.1)    Blood 600 553      IV Piggyback 1350 166 406 115    Total Intake(mL/kg) 3078 (51.3) 3369.8 (56.5) 2264 (38) 600.6 (10.1)    Urine (mL/kg/hr) 930 1500 (1) 830 (0.6) 300 (0.9)    Stool  0 0     Total Output 930 1500 830 300    Net +2148 +1869.8 +1434 +300.6            Stool Unmeasured Occurrence  8 x 3 x           Physical Exam:     General Appearance:    Alert, cooperative, in no acute distress   Lungs:     respirations regular, even and unlabored    Heart:    Regular rhythm and normal rate, normal S1 and S2, no            murmur, no gallop, no rub   Chest Wall:    No abnormalities observed   Abdomen:      Soft nontender   Extremities: No edema,    Results Review:     I reviewed the patient's new clinical results.    Lab Results (last 72 hours)     Procedure Component Value Units Date/Time    Comprehensive Metabolic Panel [[id number]]  (Abnormal) Collected: 05/08/23 0455    Specimen: Blood Updated: 05/08/23 0619     Glucose 87 mg/dL      BUN 18 mg/dL      Creatinine 0.45 mg/dL      Sodium 150 mmol/L      Potassium 3.3 mmol/L      Comment: Slight hemolysis detected by analyzer. Results may be affected.        Chloride 117 mmol/L      CO2 26.0 mmol/L      Calcium 8.5 mg/dL      Total Protein 4.8 g/dL      Albumin 2.6 g/dL      ALT (SGPT) 48 U/L      AST (SGOT) 40 U/L      Comment: Slight hemolysis detected by analyzer. Results may be affected.        Alkaline  Phosphatase 60 U/L      Total Bilirubin 0.6 mg/dL      Globulin 2.2 gm/dL      A/G Ratio 1.2 g/dL      BUN/Creatinine Ratio 40.0     Anion Gap 7.0 mmol/L      eGFR 101.7 mL/min/1.73     Narrative:      GFR Normal >60  Chronic Kidney Disease <60  Kidney Failure <15    The GFR formula is only valid for adults with stable renal function between ages 18 and 70.    CBC & Differential [[id number]]  (Abnormal) Collected: 05/08/23 0401    Specimen: Blood Updated: 05/08/23 0427    Narrative:      The following orders were created for panel order CBC & Differential.  Procedure                               Abnormality         Status                     ---------                               -----------         ------                     CBC Auto Differential[[id number]]        Abnormal            Final result                 Please view results for these tests on the individual orders.    CBC Auto Differential [[id number]]  (Abnormal) Collected: 05/08/23 0401    Specimen: Blood Updated: 05/08/23 0427     WBC 7.40 10*3/mm3      RBC 3.28 10*6/mm3      Hemoglobin 9.5 g/dL      Hematocrit 28.4 %      MCV 86.6 fL      MCH 29.0 pg      MCHC 33.5 g/dL      RDW 14.8 %      RDW-SD 46.7 fl      MPV 11.9 fL      Platelets 77 10*3/mm3      Neutrophil % 79.8 %      Lymphocyte % 10.7 %      Monocyte % 6.4 %      Eosinophil % 2.3 %      Basophil % 0.3 %      Neutrophils, Absolute 5.91 10*3/mm3      Lymphocytes, Absolute 0.79 10*3/mm3      Monocytes, Absolute 0.47 10*3/mm3      Eosinophils, Absolute 0.17 10*3/mm3      Basophils, Absolute 0.02 10*3/mm3     Blood Culture With SUSHIL - Blood, Arm, Left [[id number]]  (Normal) Collected: 05/05/23 1959    Specimen: Blood from Arm, Left Updated: 05/07/23 2015     Blood Culture No growth at 2 days    H. Pylori Antibody, IgG [[id number]] Collected: 05/07/23 1833    Specimen: Blood Updated: 05/07/23 1836    Hemoglobin & Hematocrit, Blood [[id number]]  (Abnormal) Collected: 05/07/23 1547    Specimen: Blood  Updated: 05/07/23 1602     Hemoglobin 9.7 g/dL      Hematocrit 28.7 %     Magnesium [[id number]]  (Normal) Collected: 05/07/23 0537    Specimen: Blood Updated: 05/07/23 1254     Magnesium 1.7 mg/dL     Protime-INR [[id number]]  (Normal) Collected: 05/07/23 1156    Specimen: Blood Updated: 05/07/23 1214     Protime 14.2 Seconds      INR 1.09    Hemoglobin & Hematocrit, Blood [[id number]]  (Abnormal) Collected: 05/07/23 1156    Specimen: Blood Updated: 05/07/23 1206     Hemoglobin 9.7 g/dL      Hematocrit 28.0 %     Comprehensive Metabolic Panel [[id number]]  (Abnormal) Collected: 05/07/23 0537    Specimen: Blood Updated: 05/07/23 0642     Glucose 78 mg/dL      BUN 31 mg/dL      Creatinine 0.51 mg/dL      Sodium 153 mmol/L      Potassium 3.1 mmol/L      Chloride 123 mmol/L      CO2 26.0 mmol/L      Calcium 8.3 mg/dL      Total Protein 4.0 g/dL      Albumin 2.4 g/dL      ALT (SGPT) 42 U/L      AST (SGOT) 39 U/L      Alkaline Phosphatase 51 U/L      Total Bilirubin 0.7 mg/dL      Globulin 1.6 gm/dL      A/G Ratio 1.5 g/dL      BUN/Creatinine Ratio 60.8     Anion Gap 4.0 mmol/L      eGFR 98.7 mL/min/1.73     Narrative:      GFR Normal >60  Chronic Kidney Disease <60  Kidney Failure <15    The GFR formula is only valid for adults with stable renal function between ages 18 and 70.    Lipid Panel [[id number]]  (Abnormal) Collected: 05/07/23 0537    Specimen: Blood Updated: 05/07/23 0642     Total Cholesterol 81 mg/dL      Triglycerides 89 mg/dL      HDL Cholesterol 26 mg/dL      LDL Cholesterol  37 mg/dL      VLDL Cholesterol 18 mg/dL      LDL/HDL Ratio 1.43    Narrative:      Cholesterol Reference Ranges  (U.S. Department of Health and Human Services ATP III Classifications)    Desirable          <200 mg/dL  Borderline High    200-239 mg/dL  High Risk          >240 mg/dL      Triglyceride Reference Ranges  (U.S. Department of Health and Human Services ATP III Classifications)    Normal           <150 mg/dL  Borderline High   150-199 mg/dL  High             200-499 mg/dL  Very High        >500 mg/dL    HDL Reference Ranges  (U.S. Department of Health and Human Services ATP III Classifications)    Low     <40 mg/dl (major risk factor for CHD)  High    >60 mg/dl ('negative' risk factor for CHD)        LDL Reference Ranges  (U.S. Department of Health and Human Services ATP III Classifications)    Optimal          <100 mg/dL  Near Optimal     100-129 mg/dL  Borderline High  130-159 mg/dL  High             160-189 mg/dL  Very High        >189 mg/dL    TSH [[id number]]  (Normal) Collected: 05/07/23 0537    Specimen: Blood Updated: 05/07/23 0642     TSH 4.050 uIU/mL     CBC & Differential [[id number]]  (Abnormal) Collected: 05/07/23 0537    Specimen: Blood Updated: 05/07/23 0603    Narrative:      The following orders were created for panel order CBC & Differential.  Procedure                               Abnormality         Status                     ---------                               -----------         ------                     CBC Auto Differential[[id number]]        Abnormal            Final result                 Please view results for these tests on the individual orders.    CBC Auto Differential [[id number]]  (Abnormal) Collected: 05/07/23 0537    Specimen: Blood Updated: 05/07/23 0603     WBC 8.56 10*3/mm3      RBC 2.76 10*6/mm3      Hemoglobin 8.2 g/dL      Hematocrit 23.4 %      MCV 84.8 fL      MCH 29.7 pg      MCHC 35.0 g/dL      RDW 14.2 %      RDW-SD 43.6 fl      MPV 11.5 fL      Platelets 84 10*3/mm3      Neutrophil % 76.6 %      Lymphocyte % 13.8 %      Monocyte % 7.7 %      Eosinophil % 0.9 %      Basophil % 0.2 %      Neutrophils, Absolute 6.55 10*3/mm3      Lymphocytes, Absolute 1.18 10*3/mm3      Monocytes, Absolute 0.66 10*3/mm3      Eosinophils, Absolute 0.08 10*3/mm3      Basophils, Absolute 0.02 10*3/mm3     Hemoglobin A1c [[id number]]  (Abnormal) Collected: 05/07/23 0537    Specimen: Blood Updated: 05/07/23 0603      Hemoglobin A1C 5.70 %     Narrative:      Hemoglobin A1C Ranges:    Increased Risk for Diabetes  5.7% to 6.4%  Diabetes                     >= 6.5%  Diabetic Goal                < 7.0%    Hemoglobin & Hematocrit, Blood [[id number]]  (Abnormal) Collected: 05/07/23 0010    Specimen: Blood Updated: 05/07/23 0018     Hemoglobin 8.7 g/dL      Hematocrit 27.1 %     Hemoglobin & Hematocrit, Blood [[id number]]  (Abnormal) Collected: 05/06/23 1743    Specimen: Blood Updated: 05/06/23 1755     Hemoglobin 9.3 g/dL      Hematocrit 27.6 %     POC Glucose Once [[id number]]  (Abnormal) Collected: 05/06/23 1741    Specimen: Blood Updated: 05/06/23 1752     Glucose 139 mg/dL      Comment: : [id number] GoldenMeter ID: [id number]       Hemoglobin & Hematocrit, Blood [[id number]]  (Abnormal) Collected: 05/06/23 1224    Specimen: Blood Updated: 05/06/23 1236     Hemoglobin 7.8 g/dL      Hematocrit 23.4 %     Urinalysis, Microscopic Only - Indwelling Urethral Catheter [[id number]]  (Abnormal) Collected: 05/06/23 1000    Specimen: Urine from Indwelling Urethral Catheter Updated: 05/06/23 1020     RBC, UA 3-5 /HPF      WBC, UA 0-2 /HPF      Comment: Urine culture not indicated.        Bacteria, UA None Seen /HPF      Squamous Epithelial Cells, UA 0-2 /HPF      Hyaline Casts, UA 0-2 /LPF      Methodology Automated Microscopy    Urinalysis With Culture If Indicated - Indwelling Urethral Catheter [[id number]]  (Abnormal) Collected: 05/06/23 1000    Specimen: Urine from Indwelling Urethral Catheter Updated: 05/06/23 1020     Color, UA Yellow     Appearance, UA Clear     pH, UA <=5.0     Specific Gravity, UA 1.022     Glucose, UA Negative     Ketones, UA Negative     Bilirubin, UA Negative     Blood, UA Small (1+)     Protein, UA Negative     Leuk Esterase, UA Negative     Nitrite, UA Negative     Urobilinogen, UA 0.2 E.U./dL    Narrative:      In absence of clinical symptoms, the presence of pyuria, bacteria, and/or nitrites on the  urinalysis result does not correlate with infection.    aPTT [[id number]]  (Abnormal) Collected: 05/06/23 0839    Specimen: Blood Updated: 05/06/23 0856     PTT 39.0 seconds     Protime-INR [[id number]]  (Abnormal) Collected: 05/06/23 0839    Specimen: Blood Updated: 05/06/23 0856     Protime 17.2 Seconds      INR 1.38    Hemoglobin & Hematocrit, Blood [[id number]]  (Abnormal) Collected: 05/06/23 0834    Specimen: Blood Updated: 05/06/23 0846     Hemoglobin 9.2 g/dL      Hematocrit 27.1 %     Basic Metabolic Panel [[id number]]  (Abnormal) Collected: 05/06/23 0400    Specimen: Blood Updated: 05/06/23 0459     Glucose 156 mg/dL      BUN 52 mg/dL      Creatinine 0.62 mg/dL      Sodium 147 mmol/L      Potassium 3.6 mmol/L      Comment: Slight hemolysis detected by analyzer. Results may be affected.        Chloride 120 mmol/L      CO2 19.0 mmol/L      Calcium 7.5 mg/dL      BUN/Creatinine Ratio 83.9     Anion Gap 8.0 mmol/L      eGFR 94.2 mL/min/1.73     Narrative:      GFR Normal >60  Chronic Kidney Disease <60  Kidney Failure <15    The GFR formula is only valid for adults with stable renal function between ages 18 and 70.    CBC (No Diff) [[id number]]  (Abnormal) Collected: 05/06/23 0400    Specimen: Blood Updated: 05/06/23 0458     WBC 8.66 10*3/mm3      RBC 3.51 10*6/mm3      Hemoglobin 10.0 g/dL      Hematocrit 30.4 %      MCV 86.6 fL      MCH 28.5 pg      MCHC 32.9 g/dL      RDW 13.9 %      RDW-SD 43.7 fl      MPV 13.4 fL      Platelets 59 10*3/mm3     MRSA Screen, PCR (Inpatient) - Swab, Nares [[id number]]  (Normal) Collected: 05/05/23 1946    Specimen: Swab from Nares Updated: 05/05/23 2138     MRSA PCR No MRSA Detected    Narrative:      The negative predictive value of this diagnostic test is high and should only be used to consider de-escalating anti-MRSA therapy. A positive result may indicate colonization with MRSA and must be correlated clinically.    Manual Differential [[id number]]  (Abnormal) Collected:  05/05/23 2000    Specimen: Blood Updated: 05/05/23 2127     Neutrophil % 72.6 %      Lymphocyte % 23.4 %      Monocyte % 2.4 %      Basophil % 1.6 %      Neutrophils Absolute 5.11 10*3/mm3      Lymphocytes Absolute 1.65 10*3/mm3      Monocytes Absolute 0.17 10*3/mm3      Basophils Absolute 0.11 10*3/mm3      nRBC 7.3 /100 WBC      Poikilocytes Slight/1+     Target Cells Slight/1+     WBC Morphology Normal     Platelet Estimate Decreased     Giant Platelets Large/3+    CBC Auto Differential [[id number]]  (Abnormal) Collected: 05/05/23 2000    Specimen: Blood Updated: 05/05/23 2127     WBC 7.04 10*3/mm3      RBC 2.32 10*6/mm3      Hemoglobin 6.7 g/dL      Hematocrit 19.4 %      MCV 83.6 fL      MCH 28.9 pg      MCHC 34.5 g/dL      RDW 15.0 %      RDW-SD 45.0 fl      MPV 12.4 fL      Platelets 80 10*3/mm3     Blood Gas, Arterial - [[id number]]  (Abnormal) Collected: 05/05/23 2103    Specimen: Arterial Blood Updated: 05/05/23 2101     Site Right Radial     Cayden's Test Positive     pH, Arterial 7.430 pH units      pCO2, Arterial 35.1 mm Hg      pO2, Arterial 92.1 mm Hg      HCO3, Arterial 23.3 mmol/L      Base Excess, Arterial -0.9 mmol/L      Comment: 84 Value below reference range        O2 Saturation, Arterial 98.4 %      Temperature 37.0 C      Barometric Pressure for Blood Gas 751 mmHg      Modality Nasal Cup     Flow Rate 2.0 lpm      Ventilator Mode NA     Collected by [id number]     Comment: Meter: [id number]     :  [id number]        pCO2, Temperature Corrected 35.1 mm Hg      pH, Temp Corrected 7.430 pH Units      pO2, Temperature Corrected 92.1 mm Hg     Procalcitonin [[id number]]  (Abnormal) Collected: 05/05/23 2000    Specimen: Blood Updated: 05/05/23 2042     Procalcitonin 0.27 ng/mL     Narrative:      As a Marker for Sepsis (Non-Neonates):    1. <0.5 ng/mL represents a low risk of severe sepsis and/or septic shock.  2. >2 ng/mL represents a high risk of severe sepsis and/or septic shock.    As a  "Marker for Lower Respiratory Tract Infections that require antibiotic therapy:    PCT on Admission    Antibiotic Therapy       6-12 Hrs later    >0.5                Strongly Recommended  >0.25 - <0.5        Recommended   0.1 - 0.25          Discouraged              Remeasure/reassess PCT  <0.1                Strongly Discouraged     Remeasure/reassess PCT    As 28 day mortality risk marker: \"Change in Procalcitonin Result\" (>80% or <=80%) if Day 0 (or Day 1) and Day 4 values are available. Refer to http://[email or web address]    Change in PCT <=80%  A decrease of PCT levels below or equal to 80% defines a positive change in PCT test result representing a higher risk for 28-day all-cause mortality of patients diagnosed with severe sepsis for septic shock.    Change in PCT >80%  A decrease of PCT levels of more than 80% defines a negative change in PCT result representing a lower risk for 28-day all-cause mortality of patients diagnosed with severe sepsis or septic shock.       S. Pneumo Ag Urine or CSF - Urine, Urine, Clean Catch [[id number]]  (Normal) Collected: 05/05/23 1942    Specimen: Urine, Clean Catch Updated: 05/05/23 2035     Strep Pneumo Ag Negative    Magnesium [[id number]]  (Normal) Collected: 05/05/23 2000    Specimen: Blood Updated: 05/05/23 2035     Magnesium 1.7 mg/dL     Comprehensive Metabolic Panel [[id number]]  (Abnormal) Collected: 05/05/23 2000    Specimen: Blood Updated: 05/05/23 2035     Glucose 145 mg/dL      BUN 56 mg/dL      Creatinine 0.83 mg/dL      Sodium 144 mmol/L      Potassium 3.9 mmol/L      Comment: Slight hemolysis detected by analyzer. Results may be affected.        Chloride 114 mmol/L      CO2 25.0 mmol/L      Calcium 7.8 mg/dL      Total Protein 3.9 g/dL      Albumin 2.1 g/dL      ALT (SGPT) 54 U/L      AST (SGOT) 39 U/L      Alkaline Phosphatase 53 U/L      Total Bilirubin 0.3 mg/dL      Globulin 1.8 gm/dL      A/G Ratio 1.2 g/dL      BUN/Creatinine Ratio 67.5     Anion " Gap 5.0 mmol/L      eGFR 74.5 mL/min/1.73     Narrative:      GFR Normal >60  Chronic Kidney Disease <60  Kidney Failure <15    The GFR formula is only valid for adults with stable renal function between ages 18 and 70.    Legionella Antigen, Urine - Urine, Urine, Clean Catch [[id number]]  (Normal) Collected: 05/05/23 1942    Specimen: Urine, Clean Catch Updated: 05/05/23 2035     LEGIONELLA ANTIGEN, URINE Negative    Lactic Acid, Plasma [[id number]]  (Normal) Collected: 05/05/23 2000    Specimen: Blood Updated: 05/05/23 2033     Lactate 1.8 mmol/L     Phosphorus [[id number]]  (Abnormal) Collected: 05/05/23 2000    Specimen: Blood Updated: 05/05/23 2032     Phosphorus 2.1 mg/dL         Imaging Results (Last 72 Hours)     Procedure Component Value Units Date/Time    XR Chest 1 View [[id number]] Collected: 05/05/23 1959     Updated: 05/05/23 2003    Narrative:      EXAM/TECHNIQUE: XR CHEST 1 VW-     INDICATION: Line placement, pneumonia.     COMPARISON: None     FINDINGS:     Right-sided CVL with tip overlying the cavoatrial junction. No visible  pneumothorax. Hazy veiling RIGHT basilar opacity. LEFT lung and pleural  space are clear. Cardiac silhouette is normal size. No acute osseous  finding.       Impression:         1.  Right-sided CVL with tip overlying the low SVC. No pneumothorax.  2.  Veiling RIGHT basilar opacity, likely representing small layering  pleural effusion and/or atelectasis.  This report was finalized on 05/05/2023 20:00 by Dr. Miguel Angel Grewal MD.              Assessment & Plan       Septic shock    Right lower lobe pneumonia    Intellectual disability    Anemia    Thrombocytopenia    Transaminitis    Hypoxia    Metabolic encephalopathy    Acute blood loss anemia    Hypovolemic shock    I reviewed her labs.  H&H okay after receiving blood yesterday  Plan #1 bleeding ulcer-I spoke with Dr. Mora who is willing to be on-call for embolization if bleeding occurs.      Laine Olsen MD  05/08/23  12:44  CDT

## 2023-05-08 NOTE — CASE MANAGEMENT/SOCIAL WORK
Discharge Planning Assessment  [facility]     Patient Name: Jess Ramsey  MRN: [id number]  Today's Date: 5/8/2023    Admit Date: 5/5/2023        Discharge Needs Assessment     Row Name 05/08/23 1031       Living Environment    People in Home facility resident    Current Living Arrangements group home    Family Caregiver if Needed none    Able to Return to Prior Arrangements yes       Resource/Environmental Concerns    Resource/Environmental Concerns none    Transportation Concerns none       Food Insecurity    Within the past 12 months, you worried that your food would run out before you got the money to buy more. Never true    Within the past 12 months, the food you bought just didn't last and you didn't have money to get more. Never true       Transition Planning    Patient/Family Anticipated Services at Transition     Transportation Anticipated agency;health plan transportation       Discharge Needs Assessment    Readmission Within the Last 30 Days no previous admission in last 30 days    Current Outpatient/Agency/Support Group group home    Concerns to be Addressed care coordination/care conferences;discharge planning    Discharge Coordination/Progress Patient resides at a group home in [place], [place].  Patient has a state guardian.   following to assist with disposition.  Will reach out to group home closer to discharge to ensure they can meet patient's needs.               Discharge Plan    No documentation.               Continued Care and Services - Admitted Since 5/5/2023    Coordination has not been started for this encounter.          Demographic Summary    No documentation.                Functional Status    No documentation.                Psychosocial    No documentation.                Abuse/Neglect    No documentation.                Legal    No documentation.                Substance Abuse    No documentation.                Patient Forms    No documentation.                   Dianna  EMANUEL Dubois

## 2023-05-08 NOTE — CONSULTS
[facility]  INPATIENT WOUND & OSTOMY CONSULTATION    Today's Date: 05/08/23    Patient Name: Jess Ramsey  MRN: [id number]  CSN: [id number]  PCP: David Frazier MD  Referring Provider:   Consulting Provider (From admission, onward)      Start Ordered     Status Ordering Provider    05/06/23 0721 05/06/23 0721  Inpatient Wound Care Provider Consult  Once        Specialty:  Wound Care  Provider:  Estrella Valdez APRN Acknowledged SLATE, JASON A           Attending Provider: Gallo Hazel MD  Length of Stay: 3    SUBJECTIVE   Chief Complaint: Wound of left buttock    HPI: Jess Ramsey, a 72 y.o.female, presents with a past medical history of intellectual disability, psychosis, hypertension, stroke, right hemiparesis.  A full past medical history as listed below.  Patient was transferred to [facility] from [facility] in [facility].  Patient was admitted there on 05/03 due to hypothermia and altered mental status.  Patient was started on oxygen and was experiencing hypotension.      Inpatient wound care consulted due to wound of left buttock.  Patient has a stage 3 pressure injury of the left buttock.  She is disoriented and does not turn on her own.        Visit Dx:    ICD-10-CM ICD-9-CM   1. Acute blood loss anemia  D62 285.1   2. Hypovolemic shock  R57.1 785.59   3. Oral phase dysphagia  R13.11 787.21       Hospital Problem List:     Septic shock    Right lower lobe pneumonia    Intellectual disability    Anemia    Thrombocytopenia    Transaminitis    Hypoxia    Metabolic encephalopathy      History:   Past Medical History:   Diagnosis Date    Gastritis     Hypertension     Intellectual disability     Psychosis     Right hemiparesis     Stroke      Past Surgical History:   Procedure Laterality Date    ENDOSCOPY N/A 5/6/2023    Procedure: ESOPHAGOGASTRODUODENOSCOPY WITH ANESTHESIA;  Surgeon: Sherif Villegas MD;  Location: [facility] OR;  Service: Gastroenterology;  Laterality:  N/A;  PRE GI BLEED  POST gastric ulcers,ink and clip    HIP SURGERY Right 2019     Social History     Socioeconomic History    Marital status: Single   Tobacco Use    Smoking status: Unknown   Vaping Use    Vaping Use: Never used   Substance and Sexual Activity    Alcohol use: Defer    Drug use: Defer    Sexual activity: Defer     Family History   Family history unknown: Yes       Allergies:  No Known Allergies    Medications:    Current Facility-Administered Medications:     acetaminophen (TYLENOL) tablet 650 mg, 650 mg, Oral, Q4H PRN **OR** acetaminophen (TYLENOL) 160 MG/5ML solution 650 mg, 650 mg, Oral, Q4H PRN **OR** acetaminophen (TYLENOL) suppository 650 mg, 650 mg, Rectal, Q4H PRN, Sherif Villegas MD    Chlorhexidine Gluconate Cloth 2 % pads 1 application, 1 application, Topical, Q24H, Gallo Hazel MD, 1 application at 05/08/23 0349    dextrose 5 % with KCl 20 mEq/L infusion, 50 mL/hr, Intravenous, Continuous, Gallo Hazel MD, Last Rate: 50 mL/hr at 05/07/23 1353, 50 mL/hr at 05/07/23 1353    ipratropium-albuterol (DUO-NEB) nebulizer solution 3 mL, 3 mL, Nebulization, Q6H PRN, Sherif Villegas MD    Morphine sulfate (PF) injection 1 mg, 1 mg, Intravenous, Q4H PRN, David Laird DO, 1 mg at 05/07/23 0016    mupirocin (BACTROBAN) 2 % nasal ointment 1 application, 1 application, Each Nare, BID, Gallo Hazel MD, 1 application at 05/08/23 0916    ondansetron (ZOFRAN) injection 4 mg, 4 mg, Intravenous, Q6H PRN, Sherif Villegas MD    pantoprazole (PROTONIX) 40 mg in 100mL NS IVPB, 8 mg/hr, Intravenous, Continuous, Sherif Villegas MD, Last Rate: 20 mL/hr at 05/08/23 0654, 8 mg/hr at 05/08/23 0654    Pharmacy to Dose Zosyn, , Does not apply, Continuous PRN, Sherif Villegas MD    piperacillin-tazobactam (ZOSYN) 4.5 g in iso-osmotic dextrose 100 mL IVPB (premix), 4.5 g, Intravenous, Q8H, Sherif Villegas MD, 4.5 g at 05/08/23 0349    sodium chloride 0.9 % flush 10 mL, 10 mL, Intravenous, Q12H, Slate, Sherif A,  MD, 10 mL at 05/08/23 0916    sodium chloride 0.9 % flush 10 mL, 10 mL, Intravenous, PRBakari BURCH Jason A, MD    sodium chloride 0.9 % infusion 40 mL, 40 mL, Intravenous, PRNBakari Jason A, MD    ziprasidone (GEODON) injection 10 mg, 10 mg, Intramuscular, Q12H PRN, David Laird, DO, 10 mg at 05/07/23 0258    Review of Systems:   Review of Systems   Unable to perform ROS: Mental status change       OBJECTIVE     Vitals:    05/08/23 1112   BP: 126/74   Pulse: 80   Resp:    Temp:    SpO2: 93%       PHYSICAL EXAM:   Physical Exam  Vitals and nursing note reviewed.   Constitutional:       Appearance: She is normal weight.   HENT:      Head: Normocephalic and atraumatic.   Eyes:      General: Lids are normal. Gaze aligned appropriately.   Cardiovascular:      Rate and Rhythm: Normal rate and regular rhythm.   Pulmonary:      Effort: Pulmonary effort is normal. No respiratory distress.   Genitourinary:     Comments: Bauer Catheter  Musculoskeletal:      Cervical back: Normal range of motion and neck supple.   Skin:     General: Skin is warm and dry.      Findings: Wound present.      Comments: Stage 3 PI of left buttock.  Subcutaneous tissue and slough present in wound bed.  Irregular edges attached to wound bed.  No undermining or tunneling present.  Small amount of serous drainage.  Slight erythema of periwound area.     Neurological:      Mental Status: She is lethargic and disoriented.      Motor: Weakness present.      Gait: Gait abnormal.   Psychiatric:         Attention and Perception: She is inattentive.         Cognition and Memory: Cognition is impaired.          Results Review:  Lab Results (last 48 hours)       Procedure Component Value Units Date/Time    Comprehensive Metabolic Panel [[id number]]  (Abnormal) Collected: 05/08/23 0455    Specimen: Blood Updated: 05/08/23 0619     Glucose 87 mg/dL      BUN 18 mg/dL      Creatinine 0.45 mg/dL      Sodium 150 mmol/L      Potassium 3.3 mmol/L      Comment: Slight  hemolysis detected by analyzer. Results may be affected.        Chloride 117 mmol/L      CO2 26.0 mmol/L      Calcium 8.5 mg/dL      Total Protein 4.8 g/dL      Albumin 2.6 g/dL      ALT (SGPT) 48 U/L      AST (SGOT) 40 U/L      Comment: Slight hemolysis detected by analyzer. Results may be affected.        Alkaline Phosphatase 60 U/L      Total Bilirubin 0.6 mg/dL      Globulin 2.2 gm/dL      A/G Ratio 1.2 g/dL      BUN/Creatinine Ratio 40.0     Anion Gap 7.0 mmol/L      eGFR 101.7 mL/min/1.73     Narrative:      GFR Normal >60  Chronic Kidney Disease <60  Kidney Failure <15    The GFR formula is only valid for adults with stable renal function between ages 18 and 70.    CBC & Differential [[id number]]  (Abnormal) Collected: 05/08/23 0401    Specimen: Blood Updated: 05/08/23 0427    Narrative:      The following orders were created for panel order CBC & Differential.  Procedure                               Abnormality         Status                     ---------                               -----------         ------                     CBC Auto Differential[[id number]]        Abnormal            Final result                 Please view results for these tests on the individual orders.    CBC Auto Differential [[id number]]  (Abnormal) Collected: 05/08/23 0401    Specimen: Blood Updated: 05/08/23 0427     WBC 7.40 10*3/mm3      RBC 3.28 10*6/mm3      Hemoglobin 9.5 g/dL      Hematocrit 28.4 %      MCV 86.6 fL      MCH 29.0 pg      MCHC 33.5 g/dL      RDW 14.8 %      RDW-SD 46.7 fl      MPV 11.9 fL      Platelets 77 10*3/mm3      Neutrophil % 79.8 %      Lymphocyte % 10.7 %      Monocyte % 6.4 %      Eosinophil % 2.3 %      Basophil % 0.3 %      Neutrophils, Absolute 5.91 10*3/mm3      Lymphocytes, Absolute 0.79 10*3/mm3      Monocytes, Absolute 0.47 10*3/mm3      Eosinophils, Absolute 0.17 10*3/mm3      Basophils, Absolute 0.02 10*3/mm3     Blood Culture With SUSHIL - Blood, Arm, Left [[id number]]  (Normal) Collected:  05/05/23 1959    Specimen: Blood from Arm, Left Updated: 05/07/23 2015     Blood Culture No growth at 2 days    H. Pylori Antibody, IgG [[id number]] Collected: 05/07/23 1833    Specimen: Blood Updated: 05/07/23 1836    Hemoglobin & Hematocrit, Blood [[id number]]  (Abnormal) Collected: 05/07/23 1547    Specimen: Blood Updated: 05/07/23 1602     Hemoglobin 9.7 g/dL      Hematocrit 28.7 %     Magnesium [[id number]]  (Normal) Collected: 05/07/23 0537    Specimen: Blood Updated: 05/07/23 1254     Magnesium 1.7 mg/dL     Protime-INR [[id number]]  (Normal) Collected: 05/07/23 1156    Specimen: Blood Updated: 05/07/23 1214     Protime 14.2 Seconds      INR 1.09    Hemoglobin & Hematocrit, Blood [[id number]]  (Abnormal) Collected: 05/07/23 1156    Specimen: Blood Updated: 05/07/23 1206     Hemoglobin 9.7 g/dL      Hematocrit 28.0 %     Comprehensive Metabolic Panel [[id number]]  (Abnormal) Collected: 05/07/23 0537    Specimen: Blood Updated: 05/07/23 0642     Glucose 78 mg/dL      BUN 31 mg/dL      Creatinine 0.51 mg/dL      Sodium 153 mmol/L      Potassium 3.1 mmol/L      Chloride 123 mmol/L      CO2 26.0 mmol/L      Calcium 8.3 mg/dL      Total Protein 4.0 g/dL      Albumin 2.4 g/dL      ALT (SGPT) 42 U/L      AST (SGOT) 39 U/L      Alkaline Phosphatase 51 U/L      Total Bilirubin 0.7 mg/dL      Globulin 1.6 gm/dL      A/G Ratio 1.5 g/dL      BUN/Creatinine Ratio 60.8     Anion Gap 4.0 mmol/L      eGFR 98.7 mL/min/1.73     Narrative:      GFR Normal >60  Chronic Kidney Disease <60  Kidney Failure <15    The GFR formula is only valid for adults with stable renal function between ages 18 and 70.    Lipid Panel [[id number]]  (Abnormal) Collected: 05/07/23 0537    Specimen: Blood Updated: 05/07/23 0642     Total Cholesterol 81 mg/dL      Triglycerides 89 mg/dL      HDL Cholesterol 26 mg/dL      LDL Cholesterol  37 mg/dL      VLDL Cholesterol 18 mg/dL      LDL/HDL Ratio 1.43    Narrative:      Cholesterol Reference Ranges  (U.S.  Department of Health and Human Services ATP III Classifications)    Desirable          <200 mg/dL  Borderline High    200-239 mg/dL  High Risk          >240 mg/dL      Triglyceride Reference Ranges  (U.S. Department of Health and Human Services ATP III Classifications)    Normal           <150 mg/dL  Borderline High  150-199 mg/dL  High             200-499 mg/dL  Very High        >500 mg/dL    HDL Reference Ranges  (U.S. Department of Health and Human Services ATP III Classifications)    Low     <40 mg/dl (major risk factor for CHD)  High    >60 mg/dl ('negative' risk factor for CHD)        LDL Reference Ranges  (U.S. Department of Health and Human Services ATP III Classifications)    Optimal          <100 mg/dL  Near Optimal     100-129 mg/dL  Borderline High  130-159 mg/dL  High             160-189 mg/dL  Very High        >189 mg/dL    TSH [[id number]]  (Normal) Collected: 05/07/23 0537    Specimen: Blood Updated: 05/07/23 0642     TSH 4.050 uIU/mL     CBC & Differential [[id number]]  (Abnormal) Collected: 05/07/23 0537    Specimen: Blood Updated: 05/07/23 0603    Narrative:      The following orders were created for panel order CBC & Differential.  Procedure                               Abnormality         Status                     ---------                               -----------         ------                     CBC Auto Differential[[id number]]        Abnormal            Final result                 Please view results for these tests on the individual orders.    CBC Auto Differential [[id number]]  (Abnormal) Collected: 05/07/23 0537    Specimen: Blood Updated: 05/07/23 0603     WBC 8.56 10*3/mm3      RBC 2.76 10*6/mm3      Hemoglobin 8.2 g/dL      Hematocrit 23.4 %      MCV 84.8 fL      MCH 29.7 pg      MCHC 35.0 g/dL      RDW 14.2 %      RDW-SD 43.6 fl      MPV 11.5 fL      Platelets 84 10*3/mm3      Neutrophil % 76.6 %      Lymphocyte % 13.8 %      Monocyte % 7.7 %      Eosinophil % 0.9 %      Basophil %  0.2 %      Neutrophils, Absolute 6.55 10*3/mm3      Lymphocytes, Absolute 1.18 10*3/mm3      Monocytes, Absolute 0.66 10*3/mm3      Eosinophils, Absolute 0.08 10*3/mm3      Basophils, Absolute 0.02 10*3/mm3     Hemoglobin A1c [[id number]]  (Abnormal) Collected: 05/07/23 0537    Specimen: Blood Updated: 05/07/23 0603     Hemoglobin A1C 5.70 %     Narrative:      Hemoglobin A1C Ranges:    Increased Risk for Diabetes  5.7% to 6.4%  Diabetes                     >= 6.5%  Diabetic Goal                < 7.0%    Hemoglobin & Hematocrit, Blood [[id number]]  (Abnormal) Collected: 05/07/23 0010    Specimen: Blood Updated: 05/07/23 0018     Hemoglobin 8.7 g/dL      Hematocrit 27.1 %     Hemoglobin & Hematocrit, Blood [[id number]]  (Abnormal) Collected: 05/06/23 1743    Specimen: Blood Updated: 05/06/23 1755     Hemoglobin 9.3 g/dL      Hematocrit 27.6 %     POC Glucose Once [[id number]]  (Abnormal) Collected: 05/06/23 1741    Specimen: Blood Updated: 05/06/23 1752     Glucose 139 mg/dL      Comment: : [id number] See KarenMeter ID: [id number]       Hemoglobin & Hematocrit, Blood [[id number]]  (Abnormal) Collected: 05/06/23 1224    Specimen: Blood Updated: 05/06/23 1236     Hemoglobin 7.8 g/dL      Hematocrit 23.4 %           Imaging Results (Last 72 Hours)       Procedure Component Value Units Date/Time    XR Chest 1 View [[id number]] Collected: 05/05/23 1959     Updated: 05/05/23 2003    Narrative:      EXAM/TECHNIQUE: XR CHEST 1 VW-     INDICATION: Line placement, pneumonia.     COMPARISON: None     FINDINGS:     Right-sided CVL with tip overlying the cavoatrial junction. No visible  pneumothorax. Hazy veiling RIGHT basilar opacity. LEFT lung and pleural  space are clear. Cardiac silhouette is normal size. No acute osseous  finding.       Impression:         1.  Right-sided CVL with tip overlying the low SVC. No pneumothorax.  2.  Veiling RIGHT basilar opacity, likely representing small layering  pleural effusion and/or  atelectasis.  This report was finalized on 05/05/2023 20:00 by Dr. Miguel Angel Grewal MD.               ASSESSMENT/PLAN       Examination and evaluation of wound(s) was performed.    DIAGNOSIS:     Pressure injury of left buttock, stage 3    Impaired mobility and ADLs    Bowel and bladder incontinence    Septic shock    Right lower lobe pneumonia    Intellectual disability    Anemia    Thrombocytopenia    Transaminitis    Hypoxia    Metabolic encephalopathy       PLAN:   Orders placed for wound care and moisture and pressure management.  Orders listed below.       Start     Ordered    05/08/23 2000  Wound Care  Every 12 Hours        Question Answer Comment   Wound Locations Left buttock    Wound Care Instructions Clean with NS.  Apply a nickel-thick layer of Santyl to wound base.  Cover with Vaseline gauze and secure with silicone border foam dressing.  Change q12h.    Cleanse Normal Saline    Intervention Santyl - Thick Layer    Dressing: Silicone Border Dressing    Securement Silicone Border Dressing        05/08/23 1141 05/08/23 1141  Specialty Bed Dolphin FIS Mattress  Once        Comments: For Dolphin FIS Mattress, call [employer] to order a [employer] bed frame.  If bariatric/bariatric dolphin, [employer] provides frame, mattress, pump, and trapeze (if needed).  Nurse or HUC is to call [employer], the rental company, to order the equipment, provide patient's name, room number, and if in isolation. [phone].   Question:  Specialty Bed needed  Answer:  Dolphin FIS Mattress    05/08/23 1141    05/08/23 1136  Elevate Heels Off of Bed  Until Discontinued         05/08/23 1141    05/08/23 1136  Turn Patient  Now Then Every 2 Hours         05/08/23 1141    05/08/23 1136  Use Repositioning Wedge to Position Patient  Continuous        Comments: Use Comfort Glide repositioning sheet and wedges to position patient.    05/08/23 1141    Unscheduled  Apply Moisture Barrier After Any Incontinence  As Needed      Comments: Apply to  buttocks and perineal area for protection.  Avoid wound on left buttock.   Question:  Wound Care Instructions  Answer:  Apply Moisture Barrier After Any Incontinence    05/08/23 1141                     Discussed findings and treatment plan including risks, benefits, and treatment options with patient in detail. Patient agreed with treatment plan.      This document has been electronically signed by JOSE Mueller on 5/8/2023 11:31 CDT

## 2023-05-08 NOTE — PLAN OF CARE
Goal Outcome Evaluation:  Plan of Care Reviewed With: patient, caregiver (RN Estrella)        Progress: no change (Initial Evaluation)         SPEECH-LANGUAGE PATHOLOGY EVALUATION - SWALLOW  Subjective: The patient was seen on this date for a Clinical Swallow evaluation.  Patient was alert and cooperative with encouragement. As evaluation progressed patient became uncooperative and refused further PO trials.   Primary problem: Hypothermia, altered mental status, RLL pneumonia, GI bleed with bleeding ulcer found on endo, per GI OK for clear liquids.   Medical history: HTN, intellectual disability, psychosis, R hemiparesis, stroke, lives in a group home.  Objective: Textures given included nectar thick liquid, honey thick liquid, and puree consistency.  Assessment: Difficulties were noted with thinner consistencies within the oral phase of her swallow.  Observations: Anterior loss noted with honey and nectar thick consistencies due to labial weakness. She was observed to have dry cough prior to PO trials and at the end of the evaluation but nothing directly following PO trials given. As stated above, difficulty with thinner consistencies noted in oral phase of the swallow with anterior loss. For improved intake of PO she may benefit from thicker options so less anterior loss occurs.   SLP Findings:  Patient presents with suspected chronic oral dysphagia, without esophageal component. Follow up warranted to rule out pharyngeal concern as only minimal PO given before patient became uncooperative with further trials. Due to poor ability to communicate it was difficult to determine why the patient did not want to continue and if she was in pain. She was laid back down into a reclined position and was resting comfortably at the end of the evaluation.   Recommendations: Diet Textures: thin liquid. Clear liquid diet per GI MD.  Medications should be taken crushed by alternate means. As stated above, for improved intake  patient would benefit from liquids being thickened to decrease anterior loss and improve oral control of the bolus.   Recommended Strategies: Upright for PO, small bites and sips, alternate liquids and solids, and supervision with all PO. Oral care before breakfast, after all meals and PRN.  Other Recommended Evaluations: Re-evaluation at bedside    Dysphagia therapy is recommended. Rationale: See above.    Note: If advanced to solid diet by MD would likely need puree to start as oral phase of swallow is significantly impaired due to prior medical history.     Monika Bailey MS CCC-SLP 5/8/2023 10:19 CDT

## 2023-05-09 ENCOUNTER — ANESTHESIA (OUTPATIENT)
Dept: GASTROENTEROLOGY | Facility: HOSPITAL | Age: 73
End: 2023-05-09
Payer: MEDICARE

## 2023-05-09 ENCOUNTER — ANESTHESIA EVENT (OUTPATIENT)
Dept: GASTROENTEROLOGY | Facility: HOSPITAL | Age: 73
End: 2023-05-09
Payer: MEDICARE

## 2023-05-09 LAB
ANION GAP SERPL CALCULATED.3IONS-SCNC: 8 MMOL/L (ref 5–15)
BASOPHILS # BLD AUTO: 0.02 10*3/MM3 (ref 0–0.2)
BASOPHILS NFR BLD AUTO: 0.3 % (ref 0–1.5)
BUN SERPL-MCNC: 6 MG/DL (ref 8–23)
BUN/CREAT SERPL: 17.1 (ref 7–25)
CALCIUM SPEC-SCNC: 8.1 MG/DL (ref 8.6–10.5)
CHLORIDE SERPL-SCNC: 107 MMOL/L (ref 98–107)
CO2 SERPL-SCNC: 24 MMOL/L (ref 22–29)
CREAT SERPL-MCNC: 0.35 MG/DL (ref 0.57–1)
DEPRECATED RDW RBC AUTO: 46.5 FL (ref 37–54)
EGFRCR SERPLBLD CKD-EPI 2021: 108.1 ML/MIN/1.73
EOSINOPHIL # BLD AUTO: 0.22 10*3/MM3 (ref 0–0.4)
EOSINOPHIL NFR BLD AUTO: 3 % (ref 0.3–6.2)
ERYTHROCYTE [DISTWIDTH] IN BLOOD BY AUTOMATED COUNT: 14.7 % (ref 12.3–15.4)
GLUCOSE SERPL-MCNC: 117 MG/DL (ref 65–99)
H PYLORI IGG SER IA-ACNC: 0.22 INDEX VALUE (ref 0–0.79)
HCT VFR BLD AUTO: 27.2 % (ref 34–46.6)
HGB BLD-MCNC: 9 G/DL (ref 12–15.9)
IMM GRANULOCYTES # BLD AUTO: 0.04 10*3/MM3 (ref 0–0.05)
IMM GRANULOCYTES NFR BLD AUTO: 0.5 % (ref 0–0.5)
LYMPHOCYTES # BLD AUTO: 0.8 10*3/MM3 (ref 0.7–3.1)
LYMPHOCYTES NFR BLD AUTO: 10.8 % (ref 19.6–45.3)
MCH RBC QN AUTO: 29.1 PG (ref 26.6–33)
MCHC RBC AUTO-ENTMCNC: 33.1 G/DL (ref 31.5–35.7)
MCV RBC AUTO: 88 FL (ref 79–97)
MONOCYTES # BLD AUTO: 0.56 10*3/MM3 (ref 0.1–0.9)
MONOCYTES NFR BLD AUTO: 7.5 % (ref 5–12)
NEUTROPHILS NFR BLD AUTO: 5.79 10*3/MM3 (ref 1.7–7)
NEUTROPHILS NFR BLD AUTO: 77.9 % (ref 42.7–76)
NRBC BLD AUTO-RTO: 0.8 /100 WBC (ref 0–0.2)
PLATELET # BLD AUTO: 72 10*3/MM3 (ref 140–450)
PMV BLD AUTO: 12.7 FL (ref 6–12)
POTASSIUM SERPL-SCNC: 3.4 MMOL/L (ref 3.5–5.2)
RBC # BLD AUTO: 3.09 10*6/MM3 (ref 3.77–5.28)
SODIUM SERPL-SCNC: 139 MMOL/L (ref 136–145)
WBC NRBC COR # BLD: 7.43 10*3/MM3 (ref 3.4–10.8)

## 2023-05-09 PROCEDURE — 25810000003 DEXTROSE 5 % WITH KCL 20 MEQ 20 MEQ/L SOLUTION: Performed by: FAMILY MEDICINE

## 2023-05-09 PROCEDURE — 80048 BASIC METABOLIC PNL TOTAL CA: CPT | Performed by: INTERNAL MEDICINE

## 2023-05-09 PROCEDURE — 25810000003 DEXTROSE 5 % WITH KCL 20 MEQ 20 MEQ/L SOLUTION: Performed by: INTERNAL MEDICINE

## 2023-05-09 PROCEDURE — 43255 EGD CONTROL BLEEDING ANY: CPT | Performed by: INTERNAL MEDICINE

## 2023-05-09 PROCEDURE — 25010000002 PROPOFOL 10 MG/ML EMULSION: Performed by: NURSE ANESTHETIST, CERTIFIED REGISTERED

## 2023-05-09 PROCEDURE — 85025 COMPLETE CBC W/AUTO DIFF WBC: CPT | Performed by: SPECIALIST

## 2023-05-09 PROCEDURE — 99232 SBSQ HOSP IP/OBS MODERATE 35: CPT | Performed by: CLINICAL NURSE SPECIALIST

## 2023-05-09 PROCEDURE — 92526 ORAL FUNCTION THERAPY: CPT | Performed by: SPEECH-LANGUAGE PATHOLOGIST

## 2023-05-09 PROCEDURE — 99232 SBSQ HOSP IP/OBS MODERATE 35: CPT | Performed by: SPECIALIST

## 2023-05-09 RX ORDER — PROPOFOL 10 MG/ML
VIAL (ML) INTRAVENOUS AS NEEDED
Status: DISCONTINUED | OUTPATIENT
Start: 2023-05-09 | End: 2023-05-09 | Stop reason: SURG

## 2023-05-09 RX ORDER — SODIUM CHLORIDE 9 MG/ML
75 INJECTION, SOLUTION INTRAVENOUS CONTINUOUS
Status: DISCONTINUED | OUTPATIENT
Start: 2023-05-09 | End: 2023-05-13

## 2023-05-09 RX ORDER — SODIUM CHLORIDE 0.9 % (FLUSH) 0.9 %
10 SYRINGE (ML) INJECTION AS NEEDED
Status: DISCONTINUED | OUTPATIENT
Start: 2023-05-09 | End: 2023-05-09 | Stop reason: HOSPADM

## 2023-05-09 RX ORDER — LIDOCAINE HYDROCHLORIDE 20 MG/ML
INJECTION, SOLUTION EPIDURAL; INFILTRATION; INTRACAUDAL; PERINEURAL AS NEEDED
Status: DISCONTINUED | OUTPATIENT
Start: 2023-05-09 | End: 2023-05-09 | Stop reason: SURG

## 2023-05-09 RX ORDER — SODIUM CHLORIDE 0.9 % (FLUSH) 0.9 %
10 SYRINGE (ML) INJECTION EVERY 12 HOURS SCHEDULED
Status: DISCONTINUED | OUTPATIENT
Start: 2023-05-09 | End: 2023-05-09 | Stop reason: HOSPADM

## 2023-05-09 RX ORDER — POTASSIUM CHLORIDE 750 MG/1
40 CAPSULE, EXTENDED RELEASE ORAL 2 TIMES DAILY WITH MEALS
Status: DISCONTINUED | OUTPATIENT
Start: 2023-05-09 | End: 2023-05-10 | Stop reason: SDUPTHER

## 2023-05-09 RX ORDER — SODIUM CHLORIDE 9 MG/ML
40 INJECTION, SOLUTION INTRAVENOUS AS NEEDED
Status: DISCONTINUED | OUTPATIENT
Start: 2023-05-09 | End: 2023-05-09 | Stop reason: HOSPADM

## 2023-05-09 RX ORDER — POTASSIUM CHLORIDE 750 MG/1
40 CAPSULE, EXTENDED RELEASE ORAL 2 TIMES DAILY
Status: DISCONTINUED | OUTPATIENT
Start: 2023-05-09 | End: 2023-05-09

## 2023-05-09 RX ORDER — BUPIVACAINE HCL/0.9 % NACL/PF 0.125 %
PLASTIC BAG, INJECTION (ML) EPIDURAL AS NEEDED
Status: DISCONTINUED | OUTPATIENT
Start: 2023-05-09 | End: 2023-05-09 | Stop reason: SURG

## 2023-05-09 RX ADMIN — PANTOPRAZOLE SODIUM 8 MG/HR: 40 INJECTION, POWDER, FOR SOLUTION INTRAVENOUS at 03:05

## 2023-05-09 RX ADMIN — LIDOCAINE HYDROCHLORIDE 50 MG: 20 INJECTION, SOLUTION EPIDURAL; INFILTRATION; INTRACAUDAL; PERINEURAL at 12:16

## 2023-05-09 RX ADMIN — COLLAGENASE SANTYL 1 APPLICATION: 250 OINTMENT TOPICAL at 10:11

## 2023-05-09 RX ADMIN — PANTOPRAZOLE SODIUM 8 MG/HR: 40 INJECTION, POWDER, FOR SOLUTION INTRAVENOUS at 10:12

## 2023-05-09 RX ADMIN — Medication 10 ML: at 22:02

## 2023-05-09 RX ADMIN — COLLAGENASE SANTYL 1 APPLICATION: 250 OINTMENT TOPICAL at 22:02

## 2023-05-09 RX ADMIN — POTASSIUM CHLORIDE 40 MEQ: 10 CAPSULE, COATED, EXTENDED RELEASE ORAL at 17:41

## 2023-05-09 RX ADMIN — Medication 100 MCG: at 12:16

## 2023-05-09 RX ADMIN — PANTOPRAZOLE SODIUM 8 MG/HR: 40 INJECTION, POWDER, FOR SOLUTION INTRAVENOUS at 13:37

## 2023-05-09 RX ADMIN — PANTOPRAZOLE SODIUM 8 MG/HR: 40 INJECTION, POWDER, FOR SOLUTION INTRAVENOUS at 18:39

## 2023-05-09 RX ADMIN — SODIUM CHLORIDE 100 ML/HR: 9 INJECTION, SOLUTION INTRAVENOUS at 11:30

## 2023-05-09 RX ADMIN — PROPOFOL INJECTABLE EMULSION 100 MG: 10 INJECTION, EMULSION INTRAVENOUS at 12:16

## 2023-05-09 RX ADMIN — PANTOPRAZOLE SODIUM 8 MG/HR: 40 INJECTION, POWDER, FOR SOLUTION INTRAVENOUS at 22:02

## 2023-05-09 RX ADMIN — POTASSIUM CHLORIDE AND DEXTROSE MONOHYDRATE 75 ML/HR: 150; 5 INJECTION, SOLUTION INTRAVENOUS at 02:55

## 2023-05-09 RX ADMIN — POTASSIUM CHLORIDE AND DEXTROSE MONOHYDRATE 75 ML/HR: 150; 5 INJECTION, SOLUTION INTRAVENOUS at 13:37

## 2023-05-09 NOTE — DISCHARGE PLACEMENT REQUEST
"Call back: EMMANUELLE Lopez [phone]  Note: Will fax PT/OT notes when available      Jess Sterling (72 y.o. Female)     Date of Birth   1950    Social Security Number       Address   [address] [facility] [address]    Home Phone   [phone]    MRN   [id number]       Tenriism   Other    Marital Status   Single                            Admission Date   5/5/23    Admission Type   Urgent    Admitting Provider   Gallo Hazel MD    Attending Provider   Gallo Hazel MD    Department, Room/Bed   [facility] 4B, 449/1       Discharge Date       Discharge Disposition       Discharge Destination                               Attending Provider: Gallo Hazel MD    Allergies: No Known Allergies    Isolation: None   Infection: None   Code Status: CPR    Ht: 154.9 cm (61\")   Wt: 59.6 kg (131 lb 6.4 oz)    Admission Cmt: None   Principal Problem: Septic shock [A41.9,R65.21]                 Active Insurance as of 5/5/2023     Primary Coverage     Payor Plan Insurance Group Employer/Plan Group    MEDICARE MEDICARE A & B      Payor Plan Address Payor Plan Phone Number Payor Plan Fax Number Effective Dates    [address] [phone]  9/1/1982 - None Entered    [facility] [id number]       Subscriber Name Subscriber Birth Date Member ID       JESS STERLING 1950 [id number]           Secondary Coverage     Payor Plan Insurance Group Employer/Plan Group    [place]      Payor Plan Address Payor Plan Phone Number Payor Plan Fax Number Effective Dates    [address] [address] [phone]  5/5/2023 - None Entered    [facility] [id number]       Subscriber Name Subscriber Birth Date Member ID       JESS STERLING 1950 [id number]                 Emergency Contacts      (Rel.) Home Phone Work Phone Mobile Phone    meaghan mercer (Legal Guardian) -- -- [phone]            Insurance Information                MEDICARE/MEDICARE A & B Phone: [phone]    " Subscriber: Jess Ramsey Subscriber#: [id number]    Group#: -- Precert#: --        [place] Phone: [phone]    Subscriber: Jess Ramsey Subscriber#: [id number]    Group#: -- Precert#: --             History & Physical      David Laird, DO at 05/05/23 1806              [facility] Medicine Services  HISTORY AND PHYSICAL    Date of Admission: 5/5/2023  Primary Care Physician: David Frazier MD    Subjective   Primary Historian: Chart.    Chief Complaint: Transferred for hypotension.    History of Present Illness  This is a 72-year-old -American female patient who lives at a group home in [place], [place].  She is a snow of the [facility] in [place].  She has had a prior stroke and has a cognitive deficit according to the transferring physician.  Her baseline is unclear to me.  She arrives unable to provide any history.  She is fairly somnolent and does not speak.    She was admitted at the transferring facility on 5/3.  She comes from [facility] in [facility].  Her primary care doctor, Dr. Frazier, had been caring for her.  He states that when he admitted her she was hypothermic and had altered mental status.  He searched for signs of infection and found none.  He felt that she was dehydrated.  She was warmed and placed on fluids.  She has not improved very much.  She started requiring some oxygen earlier today after she had not been.  He repeated a chest x-ray and saw a right lower lobe pneumonia.  She had also been having blood pressures in the 80s so he placed a central line and started her on norepinephrine.  They do not have an intensive care unit so he requested that she come here.    Review of Systems   Unable to perform ROS: Mental status change      Past Medical History:   Past Medical History:   Diagnosis Date   • Hypertension    • Intellectual disability    • Psychosis    • Right hemiparesis    • Stroke        Past Surgical History:  Past Surgical History:   Procedure Laterality Date   • HIP SURGERY Right 2019     Social History: Alcohol use questions deferred to the physician. Drug use questions deferred to the physician.    Family History: Family history is unknown by patient.       Allergies:  No Known Allergies    Medications:  It appears that her outpatient medications were acetaminophen, amlodipine, aspirin, atorvastatin, carvedilol, loratadine, Risperdal, Dyazide, calcium and vitamin D, PreserVision, and Senokot.    In the hospital she had been receiving albuterol, pantoprazole, Lovenox, acetaminophen, and vancomycin/Zosyn.    I have utilized all available immediate resources to obtain, update, or review the patient's current medications (including all prescriptions, over-the-counter products, herbals, cannabis/cannabidiol products, and vitamin/mineral/dietary (nutritional) supplements).    Objective     Vital Signs: BP 99/68   Pulse 88   Temp 98.5 °F (36.9 °C) (Axillary)   Resp 12   Wt 57 kg (125 lb 10.6 oz)   SpO2 93%   Physical Exam  Constitutional:       Comments: In bed.   HENT:      Head: Normocephalic and atraumatic.      Mouth/Throat:      Mouth: Mucous membranes are dry.   Eyes:      Conjunctiva/sclera: Conjunctivae normal.      Pupils: Pupils are equal, round, and reactive to light.   Neck:      Vascular: No JVD.      Comments: Right IJ CVC placed at the transferring facility.  We will get a chest x-ray to confirm placement.  Cardiovascular:      Rate and Rhythm: Normal rate and regular rhythm.      Heart sounds: Normal heart sounds.   Pulmonary:      Breath sounds: Rhonchi and rales present. No wheezing.      Comments: On 3 L of oxygen.  Slightly tachypneic at times.  Chest:      Chest wall: No tenderness.   Abdominal:      General: Bowel sounds are normal. There is no distension.      Palpations: Abdomen is soft.      Tenderness: There is no abdominal tenderness.   Musculoskeletal:          General: No tenderness or deformity. Normal range of motion.      Cervical back: Neck supple.   Skin:     General: Skin is warm and dry.      Findings: No rash.   Neurological:      Mental Status: She is disoriented.      Motor: Weakness present. No abnormal muscle tone.      Comments: Seems to be globally weak at present, but also record stated she has more chronic right hemiparesis after her stroke.  She responded to painful stimuli in the lower extremities, but not well in the upper extremities.   Psychiatric:      Comments: Fairly somnolent.        Results Reviewed from [facility]:  1.  ABG on 5/3 showed a pH of 7.316, PCO2 65, PaO2 555.  Sat of 100% on room air.  2.  Chemistries from 5/5 show a sodium of 147, potassium 4.1.  Chloride 115 and bicarb 29.  BUN and creatinine 51 and 0.96 respectively.  Calcium 7.9 and glucose 126.  Hepatic function panel shows an AST of 54, ALT 85.  Alkaline phosphatase 62 with a total bilirubin of 0.27.  Total protein 4.0 and albumin 1.4.  3.  Magnesium on 5/3 was 2.1.  4.  BNP slightly elevated today at 322.  5.  High-sensitivity troponin on 5/3 was normal at 15 and was repeated today at 52.  Upper limit of normal is 51.  6.  CBC from today shows white blood cell count of 4.8.  H&H 8.3 and 23.9 respectively.  Platelets 86,000.  7.  COVID-19 testing negative.  8.  Urinalysis showed 2+ bacteria with no pyuria and negative nitrites with trace leukocyte esterase.  9.  EKG from today at 1117 showed sinus rhythm without ST or T wave changes suggestive of ischemia.  10.  She had a recent CT of the head without contrast and a chest x-ray.  The transferring physician stated that her initial chest x-ray failed to show any problems.  He told me on the transfer line that the chest x-ray from today showed a developing right lower lobe pneumonia.  Recent CT of the head was apparently unremarkable.  No reads were sent.      I have personally reviewed and interpreted the radiology  studies and ECG obtained at time of admission.     Assessment / Plan   Assessment:   Active Hospital Problems    Diagnosis    • **Septic shock    • Right lower lobe pneumonia    • Hypoxia    • Metabolic encephalopathy    • Anemia    • Thrombocytopenia    • Intellectual disability    • Transaminitis      Treatment Plan  The patient will be admitted to my service here at [facility].  She is transferred here from [facility] in [facility].  Her primary care provider had her in the hospital there for the last 2 days.  She presented with hypothermia and altered mental status.  He thought at first she was just dehydrated and then she ultimately developed a right lower lobe pneumonia and some hypoxemia after initially having clear chest imaging and a normal urinalysis.  She became hypotensive.  A central line was placed and she was started on norepinephrine and sent here.    Check lactate and procalcitonin.  MRSA nasal screen.  Streptococcal/Legionella urinary antigens. Highly unlikely to produce sputum.    Dose vancomycin and Zosyn.    Make efforts at pulmonary toilet.  DuoNeb.    Bolus fluids and check CVP.  Try to wean off norepinephrine.    Speech therapy to evaluate her if she becomes more awake.    She may be chronically thrombocytopenic and anemic.  Unclear if she is.  Also has a transaminitis.  These issues may need to be looked into further soon.    SCDs for DVT prophylaxis.    Medical Decision Making  Number and Complexity of problems: 1 acute, highly complex problem in the form of her septic shock caused by a presumed right lower lobe pneumonia that was checked out by the transferring provider.  Differential Diagnosis: CHF.    Conditions and Status        Condition is worsening.     [facility] Data  External documents reviewed: Reviewed records from [facility].   Cardiac tracing (EKG, telemetry) interpretation: NSR.   Radiology interpretation: Was told by the transferring facility that  she had a right lower lobe pneumonia.  I was told the CT of the head was unremarkable.  Labs reviewed: As above.  Any tests that were considered but not ordered: CT of the chest.     Decision rules/scores evaluated (example [id number], Wells, etc): None considered at present.     Discussed with: The transferring physician as above.  Discussed with her nurse (Chantel) here.     Care Planning  Shared decision making: Unable to consent to anything.  Her transfer was approved by her state guardian.  Code status and discussions: Full with full interventions according to her state guardian.  Her primary care provider discussed her case with them prior to transfer.    Disposition  Social Determinants of Health that impact treatment or disposition: Lives in a group home with an intellectual disability.  [place] of the [facility].  Estimated length of stay is 3-5 days.     I confirmed that the patient's advanced care plan is present, code status is documented, and a surrogate decision maker is listed in the patient's medical record.     The patient's surrogate decision maker is her state guardian in [place].     The patient was seen and examined by me on 05/05/23 at 1745.    Electronically signed by David HOWELL. DO [place], 05/05/23, 19:13 CDT.    I spent 35 minutes providing critical care management to this patient. This excludes time spent in performing separately billed procedures.                   Electronically signed by David Laird DO at 05/05/23 1924          Physician Progress Notes (last 48 hours)      Laine Olsen MD at 05/09/23 0832          Laine Olsen MD Progress Note     LOS: 4 days   Patient Care Team:  David Frazier MD as PCP - General (Family Medicine)        Subjective     Transferred to the floor overnight.  No reports of bleeding    Objective     Vital Signs  Temp:  [97.4 °F (36.3 °C)-98.9 °F (37.2 °C)] 97.8 °F (36.6 °C)  Heart Rate:  [73-84] 73  Resp:  [16-20] 16  BP: ()/()  112/94    Intake & Output (last 3 days)       05/06 0701 05/07 0700 05/07 0701  05/08 0700 05/08 0701 05/09 0700 05/09 0701  05/10 0700    I.V. (mL/kg) 2650.8 (44.5) 1858 (31.2) 485.6 (8.1)     Blood 553       IV Piggyback 166 406 115     Total Intake(mL/kg) 3369.8 (56.5) 2264 (38) 600.6 (10.1)     Urine (mL/kg/hr) 1500 (1) 830 (0.6) 900 (0.6)     Stool 0 0 0     Total Output 1500 830 900     Net +1869.8 +1434 -299.4             Stool Unmeasured Occurrence 8 x 3 x 4 x           Physical Exam:     General Appearance:   Nonverbal   Lungs:     respirations regular, even and unlabored    Heart:    Regular rhythm and normal rate, normal S1 and S2, no            murmur, no gallop, no rub   Chest Wall:    No abnormalities observed   Abdomen:      Nontender   Extremities: No edema,    Results Review:     I reviewed the patient's new clinical results.    Lab Results (last 72 hours)     Procedure Component Value Units Date/Time    H. Pylori Antibody, IgG [[id number]] Collected: 05/07/23 1833    Specimen: Blood Updated: 05/09/23 0719     H. pylori IgG 0.22 Index Value      Comment:                              Negative           <0.80                               Equivocal    0.80 - 0.89                               Positive           >0.89       Narrative:      Performed at:  [address] [place] [address]  [id number]  : Lobo Gomez PhD, Phone:  [phone]    Blood Culture With SUSHIL - Blood, Arm, Left [[id number]]  (Normal) Collected: 05/05/23 1959    Specimen: Blood from Arm, Left Updated: 05/08/23 2015     Blood Culture No growth at 3 days    Comprehensive Metabolic Panel [[id number]]  (Abnormal) Collected: 05/08/23 0455    Specimen: Blood Updated: 05/08/23 0619     Glucose 87 mg/dL      BUN 18 mg/dL      Creatinine 0.45 mg/dL      Sodium 150 mmol/L      Potassium 3.3 mmol/L      Comment: Slight hemolysis detected by analyzer. Results may be affected.        Chloride 117 mmol/L      CO2  26.0 mmol/L      Calcium 8.5 mg/dL      Total Protein 4.8 g/dL      Albumin 2.6 g/dL      ALT (SGPT) 48 U/L      AST (SGOT) 40 U/L      Comment: Slight hemolysis detected by analyzer. Results may be affected.        Alkaline Phosphatase 60 U/L      Total Bilirubin 0.6 mg/dL      Globulin 2.2 gm/dL      A/G Ratio 1.2 g/dL      BUN/Creatinine Ratio 40.0     Anion Gap 7.0 mmol/L      eGFR 101.7 mL/min/1.73     Narrative:      GFR Normal >60  Chronic Kidney Disease <60  Kidney Failure <15    The GFR formula is only valid for adults with stable renal function between ages 18 and 70.    CBC & Differential [[id number]]  (Abnormal) Collected: 05/08/23 0401    Specimen: Blood Updated: 05/08/23 0427    Narrative:      The following orders were created for panel order CBC & Differential.  Procedure                               Abnormality         Status                     ---------                               -----------         ------                     CBC Auto Differential[[id number]]        Abnormal            Final result                 Please view results for these tests on the individual orders.    CBC Auto Differential [[id number]]  (Abnormal) Collected: 05/08/23 0401    Specimen: Blood Updated: 05/08/23 0427     WBC 7.40 10*3/mm3      RBC 3.28 10*6/mm3      Hemoglobin 9.5 g/dL      Hematocrit 28.4 %      MCV 86.6 fL      MCH 29.0 pg      MCHC 33.5 g/dL      RDW 14.8 %      RDW-SD 46.7 fl      MPV 11.9 fL      Platelets 77 10*3/mm3      Neutrophil % 79.8 %      Lymphocyte % 10.7 %      Monocyte % 6.4 %      Eosinophil % 2.3 %      Basophil % 0.3 %      Neutrophils, Absolute 5.91 10*3/mm3      Lymphocytes, Absolute 0.79 10*3/mm3      Monocytes, Absolute 0.47 10*3/mm3      Eosinophils, Absolute 0.17 10*3/mm3      Basophils, Absolute 0.02 10*3/mm3     Hemoglobin & Hematocrit, Blood [[id number]]  (Abnormal) Collected: 05/07/23 1547    Specimen: Blood Updated: 05/07/23 1602     Hemoglobin 9.7 g/dL      Hematocrit 28.7 %      Magnesium [[id number]]  (Normal) Collected: 05/07/23 0537    Specimen: Blood Updated: 05/07/23 1254     Magnesium 1.7 mg/dL     Protime-INR [[id number]]  (Normal) Collected: 05/07/23 1156    Specimen: Blood Updated: 05/07/23 1214     Protime 14.2 Seconds      INR 1.09    Hemoglobin & Hematocrit, Blood [[id number]]  (Abnormal) Collected: 05/07/23 1156    Specimen: Blood Updated: 05/07/23 1206     Hemoglobin 9.7 g/dL      Hematocrit 28.0 %     Comprehensive Metabolic Panel [[id number]]  (Abnormal) Collected: 05/07/23 0537    Specimen: Blood Updated: 05/07/23 0642     Glucose 78 mg/dL      BUN 31 mg/dL      Creatinine 0.51 mg/dL      Sodium 153 mmol/L      Potassium 3.1 mmol/L      Chloride 123 mmol/L      CO2 26.0 mmol/L      Calcium 8.3 mg/dL      Total Protein 4.0 g/dL      Albumin 2.4 g/dL      ALT (SGPT) 42 U/L      AST (SGOT) 39 U/L      Alkaline Phosphatase 51 U/L      Total Bilirubin 0.7 mg/dL      Globulin 1.6 gm/dL      A/G Ratio 1.5 g/dL      BUN/Creatinine Ratio 60.8     Anion Gap 4.0 mmol/L      eGFR 98.7 mL/min/1.73     Narrative:      GFR Normal >60  Chronic Kidney Disease <60  Kidney Failure <15    The GFR formula is only valid for adults with stable renal function between ages 18 and 70.    Lipid Panel [[id number]]  (Abnormal) Collected: 05/07/23 0537    Specimen: Blood Updated: 05/07/23 0642     Total Cholesterol 81 mg/dL      Triglycerides 89 mg/dL      HDL Cholesterol 26 mg/dL      LDL Cholesterol  37 mg/dL      VLDL Cholesterol 18 mg/dL      LDL/HDL Ratio 1.43    Narrative:      Cholesterol Reference Ranges  (U.S. Department of Health and Human Services ATP III Classifications)    Desirable          <200 mg/dL  Borderline High    200-239 mg/dL  High Risk          >240 mg/dL      Triglyceride Reference Ranges  (U.S. Department of Health and Human Services ATP III Classifications)    Normal           <150 mg/dL  Borderline High  150-199 mg/dL  High             200-499 mg/dL  Very High        >500  mg/dL    HDL Reference Ranges  (U.S. Department of Health and Human Services ATP III Classifications)    Low     <40 mg/dl (major risk factor for CHD)  High    >60 mg/dl ('negative' risk factor for CHD)        LDL Reference Ranges  (U.S. Department of Health and Human Services ATP III Classifications)    Optimal          <100 mg/dL  Near Optimal     100-129 mg/dL  Borderline High  130-159 mg/dL  High             160-189 mg/dL  Very High        >189 mg/dL    TSH [[id number]]  (Normal) Collected: 05/07/23 0537    Specimen: Blood Updated: 05/07/23 0642     TSH 4.050 uIU/mL     CBC & Differential [[id number]]  (Abnormal) Collected: 05/07/23 0537    Specimen: Blood Updated: 05/07/23 0603    Narrative:      The following orders were created for panel order CBC & Differential.  Procedure                               Abnormality         Status                     ---------                               -----------         ------                     CBC Auto Differential[[id number]]        Abnormal            Final result                 Please view results for these tests on the individual orders.    CBC Auto Differential [[id number]]  (Abnormal) Collected: 05/07/23 0537    Specimen: Blood Updated: 05/07/23 0603     WBC 8.56 10*3/mm3      RBC 2.76 10*6/mm3      Hemoglobin 8.2 g/dL      Hematocrit 23.4 %      MCV 84.8 fL      MCH 29.7 pg      MCHC 35.0 g/dL      RDW 14.2 %      RDW-SD 43.6 fl      MPV 11.5 fL      Platelets 84 10*3/mm3      Neutrophil % 76.6 %      Lymphocyte % 13.8 %      Monocyte % 7.7 %      Eosinophil % 0.9 %      Basophil % 0.2 %      Neutrophils, Absolute 6.55 10*3/mm3      Lymphocytes, Absolute 1.18 10*3/mm3      Monocytes, Absolute 0.66 10*3/mm3      Eosinophils, Absolute 0.08 10*3/mm3      Basophils, Absolute 0.02 10*3/mm3     Hemoglobin A1c [[id number]]  (Abnormal) Collected: 05/07/23 0537    Specimen: Blood Updated: 05/07/23 0603     Hemoglobin A1C 5.70 %     Narrative:      Hemoglobin A1C  Ranges:    Increased Risk for Diabetes  5.7% to 6.4%  Diabetes                     >= 6.5%  Diabetic Goal                < 7.0%    Hemoglobin & Hematocrit, Blood [[id number]]  (Abnormal) Collected: 05/07/23 0010    Specimen: Blood Updated: 05/07/23 0018     Hemoglobin 8.7 g/dL      Hematocrit 27.1 %     Hemoglobin & Hematocrit, Blood [[id number]]  (Abnormal) Collected: 05/06/23 1743    Specimen: Blood Updated: 05/06/23 1755     Hemoglobin 9.3 g/dL      Hematocrit 27.6 %     POC Glucose Once [[id number]]  (Abnormal) Collected: 05/06/23 1741    Specimen: Blood Updated: 05/06/23 1752     Glucose 139 mg/dL      Comment: : [id number] Hernándezjosé miguel ID: [id number]       Hemoglobin & Hematocrit, Blood [[id number]]  (Abnormal) Collected: 05/06/23 1224    Specimen: Blood Updated: 05/06/23 1236     Hemoglobin 7.8 g/dL      Hematocrit 23.4 %     Urinalysis, Microscopic Only - Indwelling Urethral Catheter [[id number]]  (Abnormal) Collected: 05/06/23 1000    Specimen: Urine from Indwelling Urethral Catheter Updated: 05/06/23 1020     RBC, UA 3-5 /HPF      WBC, UA 0-2 /HPF      Comment: Urine culture not indicated.        Bacteria, UA None Seen /HPF      Squamous Epithelial Cells, UA 0-2 /HPF      Hyaline Casts, UA 0-2 /LPF      Methodology Automated Microscopy    Urinalysis With Culture If Indicated - Indwelling Urethral Catheter [[id number]]  (Abnormal) Collected: 05/06/23 1000    Specimen: Urine from Indwelling Urethral Catheter Updated: 05/06/23 1020     Color, UA Yellow     Appearance, UA Clear     pH, UA <=5.0     Specific Gravity, UA 1.022     Glucose, UA Negative     Ketones, UA Negative     Bilirubin, UA Negative     Blood, UA Small (1+)     Protein, UA Negative     Leuk Esterase, UA Negative     Nitrite, UA Negative     Urobilinogen, UA 0.2 E.U./dL    Narrative:      In absence of clinical symptoms, the presence of pyuria, bacteria, and/or nitrites on the urinalysis result does not correlate with infection.    aPTT  [[id number]]  (Abnormal) Collected: 05/06/23 0839    Specimen: Blood Updated: 05/06/23 0856     PTT 39.0 seconds     Protime-INR [[id number]]  (Abnormal) Collected: 05/06/23 0839    Specimen: Blood Updated: 05/06/23 0856     Protime 17.2 Seconds      INR 1.38    Hemoglobin & Hematocrit, Blood [[id number]]  (Abnormal) Collected: 05/06/23 0834    Specimen: Blood Updated: 05/06/23 0846     Hemoglobin 9.2 g/dL      Hematocrit 27.1 %         Imaging Results (Last 72 Hours)     ** No results found for the last 72 hours. **              Assessment & Plan       Septic shock    Right lower lobe pneumonia    Intellectual disability    Anemia    Thrombocytopenia    Transaminitis    Hypoxia    Metabolic encephalopathy    Acute blood loss anemia    Hypovolemic shock      Plan acute GI bleed- no blood work ordered for this morning and so I put in the CBC.  I discussed her care today with Dr. Hazel.  I discussed with Dr. Mora regarding coiling if rebleed occurs.  He is aware and on the case on standby      Laine Olsen MD  05/09/23  08:32 CDT        Electronically signed by Laine Olsen MD at 05/09/23 0834     Sherif Villegas MD at 05/08/23 1830                [facility] Gastroenterology  Inpatient Progress Note  Today's date:  05/08/23    Jess Ramsey  1950       Reason for Follow Up:    1.  Upper GI bleed  2.  Gastric ulcer    Subjective:   Patient much more docile today.  Minimal dark stool overnight.    No Known Allergies    Current Facility-Administered Medications:   •  acetaminophen (TYLENOL) tablet 650 mg, 650 mg, Oral, Q4H PRN **OR** acetaminophen (TYLENOL) 160 MG/5ML solution 650 mg, 650 mg, Oral, Q4H PRN **OR** acetaminophen (TYLENOL) suppository 650 mg, 650 mg, Rectal, Q4H PRN, Gallo Hazel MD  •  Chlorhexidine Gluconate Cloth 2 % pads 1 application, 1 application, Topical, Q24H, Gallo Hazel MD, 1 application at 05/08/23 0349  •  collagenase ointment 1 application, 1 application, Topical, Q12H,  Estrella Valdez, APRN  •  dextrose 5 % with KCl 20 mEq/L infusion, 75 mL/hr, Intravenous, Continuous, Gallo Hazel MD, Last Rate: 75 mL/hr at 05/08/23 1418, 75 mL/hr at 05/08/23 1418  •  ipratropium-albuterol (DUO-NEB) nebulizer solution 3 mL, 3 mL, Nebulization, Q6H PRN, Gallo Hazel MD  •  Morphine sulfate (PF) injection 1 mg, 1 mg, Intravenous, Q4H PRN, Gallo Hazel MD, 1 mg at 05/07/23 0016  •  mupirocin (BACTROBAN) 2 % nasal ointment 1 application, 1 application, Each Nare, BID, Gallo Hazel MD, 1 application at 05/08/23 0916  •  ondansetron (ZOFRAN) injection 4 mg, 4 mg, Intravenous, Q6H PRN, Gallo Hazel MD  •  pantoprazole (PROTONIX) 40 mg in 100mL NS IVPB, 8 mg/hr, Intravenous, Continuous, Gallo Hazel MD, Last Rate: 20 mL/hr at 05/08/23 1227, 8 mg/hr at 05/08/23 1227  •  Pharmacy to Dose Zosyn, , Does not apply, Continuous PRN, Gallo Hazel MD  •  piperacillin-tazobactam (ZOSYN) 4.5 g in iso-osmotic dextrose 100 mL IVPB (premix), 4.5 g, Intravenous, Q8H, Gallo Hazel MD, 4.5 g at 05/08/23 1230  •  potassium chloride (MICRO-K) CR capsule 40 mEq, 40 mEq, Oral, Once, Gallo Hazel MD  •  sodium chloride 0.9 % flush 10 mL, 10 mL, Intravenous, Q12H, Gallo Hazel MD, 10 mL at 05/08/23 0916  •  sodium chloride 0.9 % flush 10 mL, 10 mL, Intravenous, PRN, Gallo Hazel MD  •  sodium chloride 0.9 % infusion 40 mL, 40 mL, Intravenous, PRN, Gallo Hazel MD  •  ziprasidone (GEODON) injection 10 mg, 10 mg, Intramuscular, Q12H PRN, Gallo Hazel MD, 10 mg at 05/07/23 0258    Review of Systems:   Review of Systems   All other systems reviewed and are negative.  Unable to obtain due to mental status; patient minimally verbal; difficult to communicate  Vital Signs:  Temp:  [96.9 °F (36.1 °C)-97.7 °F (36.5 °C)] 97.6 °F (36.4 °C)  Heart Rate:  [75-84] 79  Resp:  [17-22] 18  BP: ()/() 135/72  Body mass index is 24.83 kg/m².     Intake/Output Summary (Last 24 hours) at 5/8/2023  1830  Last data filed at 5/8/2023 1200  Gross per 24 hour   Intake 1531.6 ml   Output 655 ml   Net 876.6 ml     I/O this shift:  In: 600.6 [I.V.:485.6; IV Piggyback:115]  Out: 300 [Urine:300]  Physical Exam:  Physical Exam  CV-RRR  Lung-good expansion bilaterally  ABD-benign      Results Review:   I have reviewed all of the patient's current test results    Results from last 7 days   Lab Units 05/08/23  0401 05/07/23  1547 05/07/23  1156 05/07/23  0537 05/06/23  0834 05/06/23  0400   WBC 10*3/mm3 7.40  --   --  8.56  --  8.66   HEMOGLOBIN g/dL 9.5* 9.7* 9.7* 8.2*   < > 10.0*   HEMATOCRIT % 28.4* 28.7* 28.0* 23.4*   < > 30.4*   PLATELETS 10*3/mm3 77*  --   --  84*  --  59*    < > = values in this interval not displayed.       Results from last 7 days   Lab Units 05/08/23  0455 05/07/23  0537 05/06/23  0400 05/05/23  2000   SODIUM mmol/L 150* 153* 147* 144   POTASSIUM mmol/L 3.3* 3.1* 3.6 3.9   CHLORIDE mmol/L 117* 123* 120* 114*   CO2 mmol/L 26.0 26.0 19.0* 25.0   BUN mg/dL 18 31* 52* 56*   CREATININE mg/dL 0.45* 0.51* 0.62 0.83   CALCIUM mg/dL 8.5* 8.3* 7.5* 7.8*   BILIRUBIN mg/dL 0.6 0.7  --  0.3   ALK PHOS U/L 60 51  --  53   ALT (SGPT) U/L 48* 42*  --  54*   AST (SGOT) U/L 40* 39*  --  39*   GLUCOSE mg/dL 87 78 156* 145*       Results from last 7 days   Lab Units 05/07/23  1156 05/06/23  0839   INR  1.09 1.38*       Lab Results   Lab Value Date/Time    LIPASE 25 08/14/2020 0712    LIPASE 16 07/21/2019 2134       Radiology Review:  Imaging Results (Last 24 Hours)     ** No results found for the last 24 hours. **          Impression/Plan:  Patient Active Problem List   Diagnosis Code   • Septic shock A41.9, R65.21   • Right lower lobe pneumonia J18.9   • Intellectual disability F79   • Anemia D64.9   • Thrombocytopenia D69.6   • Transaminitis R74.01   • Hypoxia R09.02   • Metabolic encephalopathy G93.41   • Acute blood loss anemia D62   • Hypovolemic shock R57.1     UGI bleed/secondary to gastric ulcer.  Status  post epinephrine/Hemoclip with moderate success.  No further bleeding with stable Hgb.       Vascular surgery evaluation appreciated     -Continue aggressive supportive care  -PPI  -Advance to full liquids  -Relook EGD tomorrow  -Follow-up H. pylori antibody    Sherif Villegas MD  05/08/23  18:30 CDT    Electronically signed by Sherif Villegas MD at 05/08/23 8530     Gallo Hazel MD at 05/08/23 1401              [facility] Medicine Services  INPATIENT PROGRESS NOTE    Patient Name: Jess Ramsey  Date of Admission: 5/5/2023  Today's Date: 05/08/23  Length of Stay: 3  Primary Care Physician: David Frazier MD    Subjective   Chief Complaint:Altered mental status/pneumonia/GI    HPI   Patient is on 2 L of oxygen.  Patient remained nonverbal but does moan and shake her head, blood pressure is on the high side, afebrile.  Hypernatremia, improving, increase D5 normal saline 20 KCl's.  Hypokalemia, p.o. potassium.  Elevated liver enzyme, stable.  Anemia, hemoglobin stable, no sign of acute bleed.  Thrombocytopenia, decrease in platelets.    Plan to transfer patient to the floor.    Review of Systems   Unable to obtain due to difficult to communicate, patient is unable to talk, chronic condition.  All pertinent negatives and positives are as above. All other systems have been reviewed and are negative unless otherwise stated.     Objective    Temp:  [96.9 °F (36.1 °C)-98.7 °F (37.1 °C)] 97.7 °F (36.5 °C)  Heart Rate:  [75-83] 81  Resp:  [16-26] 20  BP: ()/() 150/84  Physical Exam  Vitals and nursing note reviewed.   Constitutional:       Comments: Chronically ill.   Nonverbal  HENT:      Head: Normocephalic.   Eyes:      Conjunctiva/sclera: Conjunctivae normal.      Pupils: Pupils are equal, round, and reactive to light.   Neck:      Vascular: No JVD.   Cardiovascular:      Rate and Rhythm: Normal rate and regular rhythm.      Heart sounds: Normal heart sounds.    Pulmonary:      Effort: No respiratory distress.      Breath sounds: Rhonchi present. No wheezing or rales.      Comments: Patient is on 2 L of oxygen, rhonchi bilateral bases.  Chest:      Chest wall: No tenderness.   Abdominal:      General: Bowel sounds are normal. There is no distension.      Palpations: Abdomen is soft.      Tenderness: There is no abdominal tenderness.   Musculoskeletal:         General: No tenderness or deformity.      Cervical back: Neck supple.  Arthritis of the hand with deformity.  Skin:     General: Skin is warm and dry.      Capillary Refill: Capillary refill takes 2 to 3 seconds.      Findings: No rash.   Neurological:      Cranial Nerves: No cranial nerve deficit.      Motor: Weakness present. No abnormal muscle tone.      Coordination: Coordination abnormal.      Gait: Gait abnormal.      Deep Tendon Reflexes: Reflexes normal.       Results Review:  I have reviewed the labs, radiology results, and diagnostic studies.    Laboratory Data:   Results from last 7 days   Lab Units 05/08/23  0401 05/07/23  1547 05/07/23  1156 05/07/23  0537 05/06/23  0834 05/06/23  0400   WBC 10*3/mm3 7.40  --   --  8.56  --  8.66   HEMOGLOBIN g/dL 9.5* 9.7* 9.7* 8.2*   < > 10.0*   HEMATOCRIT % 28.4* 28.7* 28.0* 23.4*   < > 30.4*   PLATELETS 10*3/mm3 77*  --   --  84*  --  59*    < > = values in this interval not displayed.        Results from last 7 days   Lab Units 05/08/23  0455 05/07/23  0537 05/06/23  0400 05/05/23  2000   SODIUM mmol/L 150* 153* 147* 144   POTASSIUM mmol/L 3.3* 3.1* 3.6 3.9   CHLORIDE mmol/L 117* 123* 120* 114*   CO2 mmol/L 26.0 26.0 19.0* 25.0   BUN mg/dL 18 31* 52* 56*   CREATININE mg/dL 0.45* 0.51* 0.62 0.83   CALCIUM mg/dL 8.5* 8.3* 7.5* 7.8*   BILIRUBIN mg/dL 0.6 0.7  --  0.3   ALK PHOS U/L 60 51  --  53   ALT (SGPT) U/L 48* 42*  --  54*   AST (SGOT) U/L 40* 39*  --  39*   GLUCOSE mg/dL 87 78 156* 145*       Culture Data:   Blood Culture   Date Value Ref Range Status    05/05/2023 No growth at 2 days  Preliminary       Radiology Data:   Imaging Results (Last 24 Hours)     ** No results found for the last 24 hours. **          I have reviewed the patient's current medications.     Assessment/Plan   Assessment  Active Hospital Problems    Diagnosis    • **Septic shock    • Right lower lobe pneumonia    • Intellectual disability    • Anemia    • Thrombocytopenia    • Transaminitis    • Hypoxia    • Metabolic encephalopathy    • Acute blood loss anemia    • Hypovolemic shock        Treatment Plan  HPI . Patient will be admitted at [facility].  She is transferred here from [facility] in [facility].  Her primary care provider had her in the hospital there for the last 2 days.  She presented with hypothermia and altered mental status.  He thought at first she was just dehydrated and then she ultimately developed a right lower lobe pneumonia and some hypoxemia after initially having clear chest imaging and a normal urinalysis.  She became hypotensive.  A central line was placed and she was started on norepinephrine and sent here.     Hypotension.  Resolved.  Levophed DC yesterday 5/6/2023.     Hypernatremia.  Change fluids to D5 water with 20 KCl.      Hypokalemia.  Lab in a.m. 20 mg IV potassium.     GI bleed.  Black tarry stool improving.  GI consult.  Serial hemoglobin and stat hemoglobin now.  Protonix drip.  I think the bleeding has been stopped.  EGD- 2 subcentimeter fusiform ulcers with overlying eschar and dark adherent clot- 2 clip was placed, epinephrine was injected, tattoo ink.  GI recommendation- possible EGD at the 72 hours.  General surgery recommendation- potentially could be coil by vascular.  Vascular recommendation- conservative treatment.  If continued bleeding or worsening of hemoglobin and hypotension then to consider coil embolization.    Anemia.  Status post 3 units of blood transfusion last night.  Hemoglobin  9.5,  stable.     Thrombocytopenia.    Status post 1 unit of platelets transfusion.  Platelet count today is  77, decreased.     Pneumonia/patient arrives somnolent/hypothermia/altered mental status . Zosyn.  Vancomycin.  Chest x-ray-  Right-sided CVL with tip overlying the low SVC,  No pneumothorax,  Veiling RIGHT basilar opacity, likely representing small layering pleural effusion and/or atelectasis.  Patient is currently on 2 L of oxygen.    Hyponatremia.  Improving.  Increase D5 free water with 20 KCl.    Hypokalemia.  P.o. potassium.     Elevated liver enzyme.    Stable.     Buttock wound, stage II.  Wound care consult.      History of stroke and cognitive deficit according to transfer physician.   Patient unable to provide any history.  Chronic right-sided weakness, nonverbal, patient does ambulate at baseline.     Patient from a group home in [facility].     Nutrition .  Full liquid diet.     Deconditioning . PT and OT consult     Blood culture with SUSHIL-  no growth in 2 days.  Legionella antigen-negative.  MRSA screen-negative.  Strep pneumo-negative.  UA ordered.     Patient transferred from [facility].   Patient is a snow of the [facility].  [phone]-[place] emergency contact, snow of [facility].   Patient lives in [facility].  At baseline patient get around a wheelchair, unable to use spoon to feed herself, she can  food with her hand and had to be spoon fed. Behavior wise baseline aggressive and yelling, this has been better with change in psych medications.      Palliative care consult . Consult  for nursing home placement.     Medical Decision Making  Number and Complexity of problems: Hypotension/pneumonia/GI bleed/anemia/failure to thrive/buttock wound/hyponatremia  Differential Diagnosis: None.     Conditions and Status        Improving.     [facility] Data  External documents reviewed: Previous note .  Cardiac tracing (EKG,  telemetry) interpretation: Sinus .  Radiology interpretation: X-ray .  Labs reviewed: Laboratory  Any tests that were considered but not ordered: Lab now and in AM.     Decision rules/scores evaluated (example [id number], Wells, etc): None     Discussed with: Nursing and patient.     Care Planning  Shared decision making: Nursing and patient  Code status and discussions: Full code.  [place] of the [facility]     Disposition  Social Determinants of Health that impact treatment or disposition: Patient is [place] a group home, [place]  3 to 6 days.     Electronically signed by Gallo Hazel MD, 05/08/23, 14:01 CDT.    Electronically signed by Gallo Hazel MD at 05/08/23 1416     Laine Olsen MD at 05/08/23 1244          Laine Olsen MD Progress Note     LOS: 3 days   Patient Care Team:  David Frazier MD as PCP - General (Family Medicine)        Subjective     She is a little more active today.  Still not verbal    Objective     Vital Signs  Temp:  [96.9 °F (36.1 °C)-98.7 °F (37.1 °C)] 97.7 °F (36.5 °C)  Heart Rate:  [75-83] 81  Resp:  [15-26] 20  BP: ()/() 150/84    Intake & Output (last 3 days)       05/05 0701  05/06 0700 05/06 0701  05/07 0700 05/07 0701  05/08 0700 05/08 0701  05/09 0700    I.V. (mL/kg) 1128 (18.8) 2650.8 (44.5) 1858 (31.2) 485.6 (8.1)    Blood 600 553      IV Piggyback 1350 166 406 115    Total Intake(mL/kg) 3078 (51.3) 3369.8 (56.5) 2264 (38) 600.6 (10.1)    Urine (mL/kg/hr) 930 1500 (1) 830 (0.6) 300 (0.9)    Stool  0 0     Total Output 930 1500 830 300    Net +2148 +1869.8 +1434 +300.6            Stool Unmeasured Occurrence  8 x 3 x           Physical Exam:     General Appearance:    Alert, cooperative, in no acute distress   Lungs:     respirations regular, even and unlabored    Heart:    Regular rhythm and normal rate, normal S1 and S2, no            murmur, no gallop, no rub   Chest Wall:    No abnormalities observed   Abdomen:      Soft nontender   Extremities:  No edema,    Results Review:     I reviewed the patient's new clinical results.    Lab Results (last 72 hours)     Procedure Component Value Units Date/Time    Comprehensive Metabolic Panel [[id number]]  (Abnormal) Collected: 05/08/23 0455    Specimen: Blood Updated: 05/08/23 0619     Glucose 87 mg/dL      BUN 18 mg/dL      Creatinine 0.45 mg/dL      Sodium 150 mmol/L      Potassium 3.3 mmol/L      Comment: Slight hemolysis detected by analyzer. Results may be affected.        Chloride 117 mmol/L      CO2 26.0 mmol/L      Calcium 8.5 mg/dL      Total Protein 4.8 g/dL      Albumin 2.6 g/dL      ALT (SGPT) 48 U/L      AST (SGOT) 40 U/L      Comment: Slight hemolysis detected by analyzer. Results may be affected.        Alkaline Phosphatase 60 U/L      Total Bilirubin 0.6 mg/dL      Globulin 2.2 gm/dL      A/G Ratio 1.2 g/dL      BUN/Creatinine Ratio 40.0     Anion Gap 7.0 mmol/L      eGFR 101.7 mL/min/1.73     Narrative:      GFR Normal >60  Chronic Kidney Disease <60  Kidney Failure <15    The GFR formula is only valid for adults with stable renal function between ages 18 and 70.    CBC & Differential [[id number]]  (Abnormal) Collected: 05/08/23 0401    Specimen: Blood Updated: 05/08/23 0427    Narrative:      The following orders were created for panel order CBC & Differential.  Procedure                               Abnormality         Status                     ---------                               -----------         ------                     CBC Auto Differential[[id number]]        Abnormal            Final result                 Please view results for these tests on the individual orders.    CBC Auto Differential [[id number]]  (Abnormal) Collected: 05/08/23 0401    Specimen: Blood Updated: 05/08/23 0427     WBC 7.40 10*3/mm3      RBC 3.28 10*6/mm3      Hemoglobin 9.5 g/dL      Hematocrit 28.4 %      MCV 86.6 fL      MCH 29.0 pg      MCHC 33.5 g/dL      RDW 14.8 %      RDW-SD 46.7 fl      MPV 11.9 fL       Platelets 77 10*3/mm3      Neutrophil % 79.8 %      Lymphocyte % 10.7 %      Monocyte % 6.4 %      Eosinophil % 2.3 %      Basophil % 0.3 %      Neutrophils, Absolute 5.91 10*3/mm3      Lymphocytes, Absolute 0.79 10*3/mm3      Monocytes, Absolute 0.47 10*3/mm3      Eosinophils, Absolute 0.17 10*3/mm3      Basophils, Absolute 0.02 10*3/mm3     Blood Culture With SUSHIL - Blood, Arm, Left [[id number]]  (Normal) Collected: 05/05/23 1959    Specimen: Blood from Arm, Left Updated: 05/07/23 2015     Blood Culture No growth at 2 days    H. Pylori Antibody, IgG [[id number]] Collected: 05/07/23 1833    Specimen: Blood Updated: 05/07/23 1836    Hemoglobin & Hematocrit, Blood [[id number]]  (Abnormal) Collected: 05/07/23 1547    Specimen: Blood Updated: 05/07/23 1602     Hemoglobin 9.7 g/dL      Hematocrit 28.7 %     Magnesium [[id number]]  (Normal) Collected: 05/07/23 0537    Specimen: Blood Updated: 05/07/23 1254     Magnesium 1.7 mg/dL     Protime-INR [[id number]]  (Normal) Collected: 05/07/23 1156    Specimen: Blood Updated: 05/07/23 1214     Protime 14.2 Seconds      INR 1.09    Hemoglobin & Hematocrit, Blood [[id number]]  (Abnormal) Collected: 05/07/23 1156    Specimen: Blood Updated: 05/07/23 1206     Hemoglobin 9.7 g/dL      Hematocrit 28.0 %     Comprehensive Metabolic Panel [[id number]]  (Abnormal) Collected: 05/07/23 0537    Specimen: Blood Updated: 05/07/23 0642     Glucose 78 mg/dL      BUN 31 mg/dL      Creatinine 0.51 mg/dL      Sodium 153 mmol/L      Potassium 3.1 mmol/L      Chloride 123 mmol/L      CO2 26.0 mmol/L      Calcium 8.3 mg/dL      Total Protein 4.0 g/dL      Albumin 2.4 g/dL      ALT (SGPT) 42 U/L      AST (SGOT) 39 U/L      Alkaline Phosphatase 51 U/L      Total Bilirubin 0.7 mg/dL      Globulin 1.6 gm/dL      A/G Ratio 1.5 g/dL      BUN/Creatinine Ratio 60.8     Anion Gap 4.0 mmol/L      eGFR 98.7 mL/min/1.73     Narrative:      GFR Normal >60  Chronic Kidney Disease <60  Kidney Failure <15    The GFR  formula is only valid for adults with stable renal function between ages 18 and 70.    Lipid Panel [[id number]]  (Abnormal) Collected: 05/07/23 0537    Specimen: Blood Updated: 05/07/23 0642     Total Cholesterol 81 mg/dL      Triglycerides 89 mg/dL      HDL Cholesterol 26 mg/dL      LDL Cholesterol  37 mg/dL      VLDL Cholesterol 18 mg/dL      LDL/HDL Ratio 1.43    Narrative:      Cholesterol Reference Ranges  (U.S. Department of Health and Human Services ATP III Classifications)    Desirable          <200 mg/dL  Borderline High    200-239 mg/dL  High Risk          >240 mg/dL      Triglyceride Reference Ranges  (U.S. Department of Health and Human Services ATP III Classifications)    Normal           <150 mg/dL  Borderline High  150-199 mg/dL  High             200-499 mg/dL  Very High        >500 mg/dL    HDL Reference Ranges  (U.S. Department of Health and Human Services ATP III Classifications)    Low     <40 mg/dl (major risk factor for CHD)  High    >60 mg/dl ('negative' risk factor for CHD)        LDL Reference Ranges  (U.S. Department of Health and Human Services ATP III Classifications)    Optimal          <100 mg/dL  Near Optimal     100-129 mg/dL  Borderline High  130-159 mg/dL  High             160-189 mg/dL  Very High        >189 mg/dL    TSH [[id number]]  (Normal) Collected: 05/07/23 0537    Specimen: Blood Updated: 05/07/23 0642     TSH 4.050 uIU/mL     CBC & Differential [[id number]]  (Abnormal) Collected: 05/07/23 0537    Specimen: Blood Updated: 05/07/23 0603    Narrative:      The following orders were created for panel order CBC & Differential.  Procedure                               Abnormality         Status                     ---------                               -----------         ------                     CBC Auto Differential[[id number]]        Abnormal            Final result                 Please view results for these tests on the individual orders.    CBC Auto Differential [[id number]]   (Abnormal) Collected: 05/07/23 0537    Specimen: Blood Updated: 05/07/23 0603     WBC 8.56 10*3/mm3      RBC 2.76 10*6/mm3      Hemoglobin 8.2 g/dL      Hematocrit 23.4 %      MCV 84.8 fL      MCH 29.7 pg      MCHC 35.0 g/dL      RDW 14.2 %      RDW-SD 43.6 fl      MPV 11.5 fL      Platelets 84 10*3/mm3      Neutrophil % 76.6 %      Lymphocyte % 13.8 %      Monocyte % 7.7 %      Eosinophil % 0.9 %      Basophil % 0.2 %      Neutrophils, Absolute 6.55 10*3/mm3      Lymphocytes, Absolute 1.18 10*3/mm3      Monocytes, Absolute 0.66 10*3/mm3      Eosinophils, Absolute 0.08 10*3/mm3      Basophils, Absolute 0.02 10*3/mm3     Hemoglobin A1c [[id number]]  (Abnormal) Collected: 05/07/23 0537    Specimen: Blood Updated: 05/07/23 0603     Hemoglobin A1C 5.70 %     Narrative:      Hemoglobin A1C Ranges:    Increased Risk for Diabetes  5.7% to 6.4%  Diabetes                     >= 6.5%  Diabetic Goal                < 7.0%    Hemoglobin & Hematocrit, Blood [[id number]]  (Abnormal) Collected: 05/07/23 0010    Specimen: Blood Updated: 05/07/23 0018     Hemoglobin 8.7 g/dL      Hematocrit 27.1 %     Hemoglobin & Hematocrit, Blood [[id number]]  (Abnormal) Collected: 05/06/23 1743    Specimen: Blood Updated: 05/06/23 1755     Hemoglobin 9.3 g/dL      Hematocrit 27.6 %     POC Glucose Once [[id number]]  (Abnormal) Collected: 05/06/23 1741    Specimen: Blood Updated: 05/06/23 1752     Glucose 139 mg/dL      Comment: : [id number] See [place]ano ID: [id number]       Hemoglobin & Hematocrit, Blood [[id number]]  (Abnormal) Collected: 05/06/23 1224    Specimen: Blood Updated: 05/06/23 1236     Hemoglobin 7.8 g/dL      Hematocrit 23.4 %     Urinalysis, Microscopic Only - Indwelling Urethral Catheter [[id number]]  (Abnormal) Collected: 05/06/23 1000    Specimen: Urine from Indwelling Urethral Catheter Updated: 05/06/23 1020     RBC, UA 3-5 /HPF      WBC, UA 0-2 /HPF      Comment: Urine culture not indicated.        Bacteria, UA None Seen  /HPF      Squamous Epithelial Cells, UA 0-2 /HPF      Hyaline Casts, UA 0-2 /LPF      Methodology Automated Microscopy    Urinalysis With Culture If Indicated - Indwelling Urethral Catheter [[id number]]  (Abnormal) Collected: 05/06/23 1000    Specimen: Urine from Indwelling Urethral Catheter Updated: 05/06/23 1020     Color, UA Yellow     Appearance, UA Clear     pH, UA <=5.0     Specific Gravity, UA 1.022     Glucose, UA Negative     Ketones, UA Negative     Bilirubin, UA Negative     Blood, UA Small (1+)     Protein, UA Negative     Leuk Esterase, UA Negative     Nitrite, UA Negative     Urobilinogen, UA 0.2 E.U./dL    Narrative:      In absence of clinical symptoms, the presence of pyuria, bacteria, and/or nitrites on the urinalysis result does not correlate with infection.    aPTT [[id number]]  (Abnormal) Collected: 05/06/23 0839    Specimen: Blood Updated: 05/06/23 0856     PTT 39.0 seconds     Protime-INR [[id number]]  (Abnormal) Collected: 05/06/23 0839    Specimen: Blood Updated: 05/06/23 0856     Protime 17.2 Seconds      INR 1.38    Hemoglobin & Hematocrit, Blood [[id number]]  (Abnormal) Collected: 05/06/23 0834    Specimen: Blood Updated: 05/06/23 0846     Hemoglobin 9.2 g/dL      Hematocrit 27.1 %     Basic Metabolic Panel [[id number]]  (Abnormal) Collected: 05/06/23 0400    Specimen: Blood Updated: 05/06/23 0459     Glucose 156 mg/dL      BUN 52 mg/dL      Creatinine 0.62 mg/dL      Sodium 147 mmol/L      Potassium 3.6 mmol/L      Comment: Slight hemolysis detected by analyzer. Results may be affected.        Chloride 120 mmol/L      CO2 19.0 mmol/L      Calcium 7.5 mg/dL      BUN/Creatinine Ratio 83.9     Anion Gap 8.0 mmol/L      eGFR 94.2 mL/min/1.73     Narrative:      GFR Normal >60  Chronic Kidney Disease <60  Kidney Failure <15    The GFR formula is only valid for adults with stable renal function between ages 18 and 70.    CBC (No Diff) [[id number]]  (Abnormal) Collected: 05/06/23 0400    Specimen:  Blood Updated: 05/06/23 0458     WBC 8.66 10*3/mm3      RBC 3.51 10*6/mm3      Hemoglobin 10.0 g/dL      Hematocrit 30.4 %      MCV 86.6 fL      MCH 28.5 pg      MCHC 32.9 g/dL      RDW 13.9 %      RDW-SD 43.7 fl      MPV 13.4 fL      Platelets 59 10*3/mm3     MRSA Screen, PCR (Inpatient) - Swab, Nares [[id number]]  (Normal) Collected: 05/05/23 1946    Specimen: Swab from Nares Updated: 05/05/23 2138     MRSA PCR No MRSA Detected    Narrative:      The negative predictive value of this diagnostic test is high and should only be used to consider de-escalating anti-MRSA therapy. A positive result may indicate colonization with MRSA and must be correlated clinically.    Manual Differential [[id number]]  (Abnormal) Collected: 05/05/23 2000    Specimen: Blood Updated: 05/05/23 2127     Neutrophil % 72.6 %      Lymphocyte % 23.4 %      Monocyte % 2.4 %      Basophil % 1.6 %      Neutrophils Absolute 5.11 10*3/mm3      Lymphocytes Absolute 1.65 10*3/mm3      Monocytes Absolute 0.17 10*3/mm3      Basophils Absolute 0.11 10*3/mm3      nRBC 7.3 /100 WBC      Poikilocytes Slight/1+     Target Cells Slight/1+     WBC Morphology Normal     Platelet Estimate Decreased     Giant Platelets Large/3+    CBC Auto Differential [[id number]]  (Abnormal) Collected: 05/05/23 2000    Specimen: Blood Updated: 05/05/23 2127     WBC 7.04 10*3/mm3      RBC 2.32 10*6/mm3      Hemoglobin 6.7 g/dL      Hematocrit 19.4 %      MCV 83.6 fL      MCH 28.9 pg      MCHC 34.5 g/dL      RDW 15.0 %      RDW-SD 45.0 fl      MPV 12.4 fL      Platelets 80 10*3/mm3     Blood Gas, Arterial - [[id number]]  (Abnormal) Collected: 05/05/23 2103    Specimen: Arterial Blood Updated: 05/05/23 2101     Site Right Radial     Cayden's Test Positive     pH, Arterial 7.430 pH units      pCO2, Arterial 35.1 mm Hg      pO2, Arterial 92.1 mm Hg      HCO3, Arterial 23.3 mmol/L      Base Excess, Arterial -0.9 mmol/L      Comment: 84 Value below reference range        O2 Saturation,  "Arterial 98.4 %      Temperature 37.0 C      Barometric Pressure for Blood Gas 751 mmHg      Modality Nasal Cup     Flow Rate 2.0 lpm      Ventilator Mode NA     Collected by [id number]     Comment: Meter: [id number]     :  [id number]        pCO2, Temperature Corrected 35.1 mm Hg      pH, Temp Corrected 7.430 pH Units      pO2, Temperature Corrected 92.1 mm Hg     Procalcitonin [[id number]]  (Abnormal) Collected: 05/05/23 2000    Specimen: Blood Updated: 05/05/23 2042     Procalcitonin 0.27 ng/mL     Narrative:      As a Marker for Sepsis (Non-Neonates):    1. <0.5 ng/mL represents a low risk of severe sepsis and/or septic shock.  2. >2 ng/mL represents a high risk of severe sepsis and/or septic shock.    As a Marker for Lower Respiratory Tract Infections that require antibiotic therapy:    PCT on Admission    Antibiotic Therapy       6-12 Hrs later    >0.5                Strongly Recommended  >0.25 - <0.5        Recommended   0.1 - 0.25          Discouraged              Remeasure/reassess PCT  <0.1                Strongly Discouraged     Remeasure/reassess PCT    As 28 day mortality risk marker: \"Change in Procalcitonin Result\" (>80% or <=80%) if Day 0 (or Day 1) and Day 4 values are available. Refer to http://www.[employer]-pct-calculator.com    Change in PCT <=80%  A decrease of PCT levels below or equal to 80% defines a positive change in PCT test result representing a higher risk for 28-day all-cause mortality of patients diagnosed with severe sepsis for septic shock.    Change in PCT >80%  A decrease of PCT levels of more than 80% defines a negative change in PCT result representing a lower risk for 28-day all-cause mortality of patients diagnosed with severe sepsis or septic shock.       S. Pneumo Ag Urine or CSF - Urine, Urine, Clean Catch [[id number]]  (Normal) Collected: 05/05/23 1942    Specimen: Urine, Clean Catch Updated: 05/05/23 2035     Strep Pneumo Ag Negative    Magnesium [[id number]]  (Normal) " Collected: 05/05/23 2000    Specimen: Blood Updated: 05/05/23 2035     Magnesium 1.7 mg/dL     Comprehensive Metabolic Panel [[id number]]  (Abnormal) Collected: 05/05/23 2000    Specimen: Blood Updated: 05/05/23 2035     Glucose 145 mg/dL      BUN 56 mg/dL      Creatinine 0.83 mg/dL      Sodium 144 mmol/L      Potassium 3.9 mmol/L      Comment: Slight hemolysis detected by analyzer. Results may be affected.        Chloride 114 mmol/L      CO2 25.0 mmol/L      Calcium 7.8 mg/dL      Total Protein 3.9 g/dL      Albumin 2.1 g/dL      ALT (SGPT) 54 U/L      AST (SGOT) 39 U/L      Alkaline Phosphatase 53 U/L      Total Bilirubin 0.3 mg/dL      Globulin 1.8 gm/dL      A/G Ratio 1.2 g/dL      BUN/Creatinine Ratio 67.5     Anion Gap 5.0 mmol/L      eGFR 74.5 mL/min/1.73     Narrative:      GFR Normal >60  Chronic Kidney Disease <60  Kidney Failure <15    The GFR formula is only valid for adults with stable renal function between ages 18 and 70.    Legionella Antigen, Urine - Urine, Urine, Clean Catch [[id number]]  (Normal) Collected: 05/05/23 1942    Specimen: Urine, Clean Catch Updated: 05/05/23 2035     LEGIONELLA ANTIGEN, URINE Negative    Lactic Acid, Plasma [[id number]]  (Normal) Collected: 05/05/23 2000    Specimen: Blood Updated: 05/05/23 2033     Lactate 1.8 mmol/L     Phosphorus [[id number]]  (Abnormal) Collected: 05/05/23 2000    Specimen: Blood Updated: 05/05/23 2032     Phosphorus 2.1 mg/dL         Imaging Results (Last 72 Hours)     Procedure Component Value Units Date/Time    XR Chest 1 View [[id number]] Collected: 05/05/23 1959     Updated: 05/05/23 2003    Narrative:      EXAM/TECHNIQUE: XR CHEST 1 VW-     INDICATION: Line placement, pneumonia.     COMPARISON: None     FINDINGS:     Right-sided CVL with tip overlying the cavoatrial junction. No visible  pneumothorax. Hazy veiling RIGHT basilar opacity. LEFT lung and pleural  space are clear. Cardiac silhouette is normal size. No acute osseous  finding.        Impression:         1.  Right-sided CVL with tip overlying the low SVC. No pneumothorax.  2.  Veiling RIGHT basilar opacity, likely representing small layering  pleural effusion and/or atelectasis.  This report was finalized on 05/05/2023 20:00 by Dr. Miguel Angel Grewal MD.              Assessment & Plan       Septic shock    Right lower lobe pneumonia    Intellectual disability    Anemia    Thrombocytopenia    Transaminitis    Hypoxia    Metabolic encephalopathy    Acute blood loss anemia    Hypovolemic shock    I reviewed her labs.  H&H okay after receiving blood yesterday  Plan #1 bleeding ulcer-I spoke with Dr. Mora who is willing to be on-call for embolization if bleeding occurs.      Laine Olsen MD  05/08/23  12:44 CDT        Electronically signed by Laine Olsen MD at 05/08/23 1245     Sherif Villegas MD at 05/07/23 1601                General acute hospital Gastroenterology  Inpatient Progress Note  Today's date:  05/07/23    Jess Ramsey  1950       Reason for Follow Up:    1.  Upper GI bleed  2.  Gastric ulcer    Subjective:   Patient had a few small black stools with the last one being 1 smear.  No new events overnight.  Essentially has been hemodynamically stable.    No Known Allergies    Current Facility-Administered Medications:   •  acetaminophen (TYLENOL) tablet 650 mg, 650 mg, Oral, Q4H PRN **OR** acetaminophen (TYLENOL) 160 MG/5ML solution 650 mg, 650 mg, Oral, Q4H PRN **OR** acetaminophen (TYLENOL) suppository 650 mg, 650 mg, Rectal, Q4H PRN, Sherif Villegas MD  •  [START ON 5/8/2023] Chlorhexidine Gluconate Cloth 2 % pads 1 application, 1 application, Topical, Q24H, Gallo Hazel MD  •  dextrose 5 % with KCl 20 mEq/L infusion, 50 mL/hr, Intravenous, Continuous, Gallo Hazel MD, Last Rate: 50 mL/hr at 05/07/23 1353, 50 mL/hr at 05/07/23 1353  •  ipratropium-albuterol (DUO-NEB) nebulizer solution 3 mL, 3 mL, Nebulization, Q6H PRN, Sherif Villegas MD  •  Morphine sulfate (PF) injection 1  mg, 1 mg, Intravenous, Q4H PRN, David Laird DO, 1 mg at 05/07/23 0016  •  mupirocin (BACTROBAN) 2 % nasal ointment 1 application, 1 application, Each Nare, BID, Gallo Hazel MD, 1 application at 05/07/23 1227  •  ondansetron (ZOFRAN) injection 4 mg, 4 mg, Intravenous, Q6H PRN, Sherif Villegas MD  •  pantoprazole (PROTONIX) 40 mg in 100mL NS IVPB, 8 mg/hr, Intravenous, Continuous, Sherif Villegas MD, Last Rate: 20 mL/hr at 05/07/23 0902, 8 mg/hr at 05/07/23 0902  •  Pharmacy to Dose Zosyn, , Does not apply, Continuous PRN, Sherif Villegas MD  •  piperacillin-tazobactam (ZOSYN) 4.5 g in iso-osmotic dextrose 100 mL IVPB (premix), 4.5 g, Intravenous, Q8H, Sherif Villegas MD, 4.5 g at 05/07/23 1227  •  potassium chloride 20 mEq in 100 mL IVPB, 20 mEq, Intravenous, Once, Gallo Hazel MD, Last Rate: 50 mL/hr at 05/07/23 1403, 20 mEq at 05/07/23 1403  •  sodium chloride 0.9 % flush 10 mL, 10 mL, Intravenous, Q12H, Sherif Villegas MD, 10 mL at 05/07/23 0902  •  sodium chloride 0.9 % flush 10 mL, 10 mL, Intravenous, PRN, Sherif Villegas MD  •  sodium chloride 0.9 % infusion 40 mL, 40 mL, Intravenous, PRN, Sherif Villegas MD  •  ziprasidone (GEODON) injection 10 mg, 10 mg, Intramuscular, Q12H PRN, David Laird DO, 10 mg at 05/07/23 0258    Review of Systems:   Review of Systems   All other systems reviewed and are negative.  Unable to obtain due to mental status; patient minimally verbal; difficult to communicate    Vital Signs:  Temp:  [94.7 °F (34.8 °C)-98.8 °F (37.1 °C)] 97.7 °F (36.5 °C)  Heart Rate:  [71-93] 81  Resp:  [13-16] 15  BP: ()/(37-96) 135/82  Body mass index is 24.83 kg/m².     Intake/Output Summary (Last 24 hours) at 5/7/2023 1601  Last data filed at 5/7/2023 1222  Gross per 24 hour   Intake 3021 ml   Output 1350 ml   Net 1671 ml     I/O this shift:  In: 1039 [I.V.:939; IV Piggyback:100]  Out: 350 [Urine:350]  Physical Exam:  Physical Exam  CV-RRR  Lung-good expansion  bilaterally  ABD-benign   Results Review:   I have reviewed all of the patient's current test results    Results from last 7 days   Lab Units 05/07/23  1156 05/07/23  0537 05/07/23  0010 05/06/23  0834 05/06/23  0400 05/05/23 2000   WBC 10*3/mm3  --  8.56  --   --  8.66 7.04   HEMOGLOBIN g/dL 9.7* 8.2* 8.7*   < > 10.0* 6.7*   HEMATOCRIT % 28.0* 23.4* 27.1*   < > 30.4* 19.4*   PLATELETS 10*3/mm3  --  84*  --   --  59* 80*    < > = values in this interval not displayed.       Results from last 7 days   Lab Units 05/07/23 0537 05/06/23  0400 05/05/23 2000   SODIUM mmol/L 153* 147* 144   POTASSIUM mmol/L 3.1* 3.6 3.9   CHLORIDE mmol/L 123* 120* 114*   CO2 mmol/L 26.0 19.0* 25.0   BUN mg/dL 31* 52* 56*   CREATININE mg/dL 0.51* 0.62 0.83   CALCIUM mg/dL 8.3* 7.5* 7.8*   BILIRUBIN mg/dL 0.7  --  0.3   ALK PHOS U/L 51  --  53   ALT (SGPT) U/L 42*  --  54*   AST (SGOT) U/L 39*  --  39*   GLUCOSE mg/dL 78 156* 145*       Results from last 7 days   Lab Units 05/07/23  1156 05/06/23  0839   INR  1.09 1.38*       Lab Results   Lab Value Date/Time    LIPASE 25 08/14/2020 0712    LIPASE 16 07/21/2019 2134       Radiology Review:  Imaging Results (Last 24 Hours)     ** No results found for the last 24 hours. **          Impression/Plan:  Patient Active Problem List   Diagnosis Code   • Septic shock A41.9, R65.21   • Right lower lobe pneumonia J18.9   • Intellectual disability F79   • Anemia D64.9   • Thrombocytopenia D69.6   • Transaminitis R74.01   • Hypoxia R09.02   • Metabolic encephalopathy G93.41     UGI bleed/secondary to gastric ulcer.  Status post epinephrine/Hemoclip with moderate success.  No further bleeding with stable Hgb.  Thrombocytopenia stable.  INR WNL.    Surgery evaluation appreciated.  Agree with vascular consult for possible coiling as prevention of rebleeding.    -Continue aggressive supportive care  -PPI  -Clears okay  -Consider relook EGD on Tuesday  -Check H. pylori antibody    Sherif Villegas,  MD  05/07/23  16:01 CDT    Electronically signed by Sherif Villegas MD at 05/07/23 8157     Gallo Hazel MD at 05/07/23 3170              [facility] Medicine Services  INPATIENT PROGRESS NOTE    Patient Name: Jess Ramsey  Date of Admission: 5/5/2023  Today's Date: 05/07/23  Length of Stay: 2  Primary Care Physician: David Frazier MD    Subjective   Chief Complaint: Altered mental status/pneumonia/GI    HPI   Patient remains nonverbal.  Patient does scream and yell.  Blood pressure stable, Levophed stopped yesterday.  Hyponatremia, change fluids to D5 water with 20 KCl.  Hypokalemia 20 mg potassium runs.  Hemoglobin stabilizing, still has black tarry stool but improving.  Thrombocytopenia, improving with 1 unit of platelets.  Change serial hemoglobin to every 8 hours.  Patient is on 2 L.    Review of Systems    Unable to obtain due to altered mental status.    All pertinent negatives and positives are as above. All other systems have been reviewed and are negative unless otherwise stated.     Objective    Temp:  [94.5 °F (34.7 °C)-98.8 °F (37.1 °C)] 97.7 °F (36.5 °C)  Heart Rate:  [] 82  Resp:  [13-27] 14  BP: ()/(37-96) 101/68  Physical Exam  Vitals and nursing note reviewed.   Constitutional:       Comments: Chronically ill.   Nonverbal  HENT:      Head: Normocephalic.   Eyes:      Conjunctiva/sclera: Conjunctivae normal.      Pupils: Pupils are equal, round, and reactive to light.   Neck:      Vascular: No JVD.   Cardiovascular:      Rate and Rhythm: Normal rate and regular rhythm.      Heart sounds: Normal heart sounds.   Pulmonary:      Effort: No respiratory distress.      Breath sounds: Rhonchi present. No wheezing or rales.      Comments: Patient is on 2 L of oxygen, rhonchi bilateral bases.  Chest:      Chest wall: No tenderness.   Abdominal:      General: Bowel sounds are normal. There is no distension.      Palpations: Abdomen is soft.       Tenderness: There is no abdominal tenderness.   Musculoskeletal:         General: No tenderness or deformity.      Cervical back: Neck supple.   Skin:     General: Skin is warm and dry.      Capillary Refill: Capillary refill takes 2 to 3 seconds.      Findings: No rash.   Neurological:      Cranial Nerves: No cranial nerve deficit.      Motor: Weakness present. No abnormal muscle tone.      Coordination: Coordination abnormal.      Gait: Gait abnormal.      Deep Tendon Reflexes: Reflexes normal.       Results Review:  I have reviewed the labs, radiology results, and diagnostic studies.    Laboratory Data:   Results from last 7 days   Lab Units 05/07/23  1156 05/07/23  0537 05/07/23  0010 05/06/23  0834 05/06/23  0400 05/05/23  2000   WBC 10*3/mm3  --  8.56  --   --  8.66 7.04   HEMOGLOBIN g/dL 9.7* 8.2* 8.7*   < > 10.0* 6.7*   HEMATOCRIT % 28.0* 23.4* 27.1*   < > 30.4* 19.4*   PLATELETS 10*3/mm3  --  84*  --   --  59* 80*    < > = values in this interval not displayed.        Results from last 7 days   Lab Units 05/07/23  0537 05/06/23  0400 05/05/23 2000   SODIUM mmol/L 153* 147* 144   POTASSIUM mmol/L 3.1* 3.6 3.9   CHLORIDE mmol/L 123* 120* 114*   CO2 mmol/L 26.0 19.0* 25.0   BUN mg/dL 31* 52* 56*   CREATININE mg/dL 0.51* 0.62 0.83   CALCIUM mg/dL 8.3* 7.5* 7.8*   BILIRUBIN mg/dL 0.7  --  0.3   ALK PHOS U/L 51  --  53   ALT (SGPT) U/L 42*  --  54*   AST (SGOT) U/L 39*  --  39*   GLUCOSE mg/dL 78 156* 145*       Culture Data:   Blood Culture   Date Value Ref Range Status   05/05/2023 No growth at 24 hours  Preliminary       Radiology Data:   Imaging Results (Last 24 Hours)     ** No results found for the last 24 hours. **          I have reviewed the patient's current medications.     Assessment/Plan   Assessment  Active Hospital Problems    Diagnosis    • **Septic shock    • Right lower lobe pneumonia    • Intellectual disability    • Anemia    • Thrombocytopenia    • Transaminitis    • Hypoxia    • Metabolic  encephalopathy        Treatment Plan  HPI . Patient will be admitted at [facility].  She is transferred here from [facility] in [facility].  Her primary care provider had her in the hospital there for the last 2 days.  She presented with hypothermia and altered mental status.  He thought at first she was just dehydrated and then she ultimately developed a right lower lobe pneumonia and some hypoxemia after initially having clear chest imaging and a normal urinalysis.  She became hypotensive.  A central line was placed and she was started on norepinephrine and sent here.     Hypotension.  Resolved.  Levophed DC yesterday 5/6/2023.    Hypernatremia.  Change fluids to D5 water with 20 KCl.     Hypokalemia.  Lab in a.m. 20 mg IV potassium.     GI bleed.  Black tarry stool improving.  GI consult.  Serial hemoglobin and stat hemoglobin now.  Protonix drip.  I think the bleeding has been stopped.  EGD- 2 subcentimeter fusiform ulcers with overlying eschar and dark adherent clot- 2 clip was placed, epinephrine was injected, tattoo ink.  GI recommendation- possible EGD at the 72 hours.  General surgery recommendation- potentially could be coil by vascular.  Consult vascular in AM.      Anemia.  Status post 3 units of blood transfusion last night.  Hemoglobin 9.7 this morning.     Thrombocytopenia.    Status post 1 unit of platelets transfusion.  Platelet count today is 84.     Pneumonia/patient arrives somnolent/hypothermia/altered mental status . Zosyn.  Vancomycin.  Chest x-ray-  Right-sided CVL with tip overlying the low SVC,  No pneumothorax,  Veiling RIGHT basilar opacity, likely representing small layering pleural effusion and/or atelectasis.  Patient is currently on 2 L of oxygen    Elevated liver enzyme.  We will follow.     Buttock wound, stage II.  Wound care consult.       Dehydration . Ongoing IV hydration     History of stroke and cognitive deficit according to transfer physician.    Patient unable to provide any history.  Chronic right-sided weakness, nonverbal, patient does ambulate at baseline.     Patient from a group home in [facility].     Nutrition . Speech evaluate      Deconditioning . PT and OT consult     Blood culture with SUSHIL-  no growth in 24 hours.  Legionella antigen-negative.  MRSA screen-negative.  Strep pneumo-negative.  UA ordered.     Patient transferred from [facility].   Patient is a [facility] of [facility].  [phone]-Rxcebax [place] emergency contact, [facility].   Patient lives in [facility].  At baseline patient get around a wheelchair, unable to use spoon to feed herself, she can  food with her hand and had to be spoon fed. Behavior wise baseline aggressive and yelling, this has been better with change in psych medications.      Palliative care consult on Monday     Medical Decision Making  Number and Complexity of problems: Hypotension/pneumonia/GI bleed/anemia/failure to thrive/buttock wound/hyponatremia  Differential Diagnosis: None.     Conditions and Status      Critical .     MDM Data  External documents reviewed: Previous note .  Cardiac tracing (EKG, telemetry) interpretation: Sinus .  Radiology interpretation: X-ray .  Labs reviewed: Laboratory  Any tests that were considered but not ordered: Lab now and in AM.     Decision rules/scores evaluated (example [id number], [place], etc): None     Discussed with: Nursing and patient.     Care Planning  Shared decision making: Nursing and patient  Code status and discussions: Full code.  [facility]     Disposition  Social Determinants of Health that impact treatment or disposition: Patient is from a group home, [place].  3 to 6 days.     Electronically signed by Gallo Hazel MD, 05/07/23, 12:39 CDT.    Electronically signed by Gallo Hazel MD at 05/07/23 [id number]

## 2023-05-09 NOTE — PROGRESS NOTES
Laine Olsen MD Progress Note     LOS: 4 days   Patient Care Team:  David Frazier MD as PCP - General (Family Medicine)        Subjective     Transferred to the floor overnight.  No reports of bleeding    Objective     Vital Signs  Temp:  [97.4 °F (36.3 °C)-98.9 °F (37.2 °C)] 97.8 °F (36.6 °C)  Heart Rate:  [73-84] 73  Resp:  [16-20] 16  BP: ()/() 112/94    Intake & Output (last 3 days)       05/06 0701  05/07 0700 05/07 0701  05/08 0700 05/08 0701  05/09 0700 05/09 0701  05/10 0700    I.V. (mL/kg) 2650.8 (44.5) 1858 (31.2) 485.6 (8.1)     Blood 553       IV Piggyback 166 406 115     Total Intake(mL/kg) 3369.8 (56.5) 2264 (38) 600.6 (10.1)     Urine (mL/kg/hr) 1500 (1) 830 (0.6) 900 (0.6)     Stool 0 0 0     Total Output 1500 830 900     Net +1869.8 +1434 -299.4             Stool Unmeasured Occurrence 8 x 3 x 4 x           Physical Exam:     General Appearance:   Nonverbal   Lungs:     respirations regular, even and unlabored    Heart:    Regular rhythm and normal rate, normal S1 and S2, no            murmur, no gallop, no rub   Chest Wall:    No abnormalities observed   Abdomen:      Nontender   Extremities: No edema,    Results Review:     I reviewed the patient's new clinical results.    Lab Results (last 72 hours)     Procedure Component Value Units Date/Time    H. Pylori Antibody, IgG [[id number]] Collected: 05/07/23 1833    Specimen: Blood Updated: 05/09/23 0719     H. pylori IgG 0.22 Index Value      Comment:                              Negative           <0.80                               Equivocal    0.80 - 0.89                               Positive           >0.89       Narrative:      Performed at:  [address] [place] [address]  [id number]  : Lobo Gomez PhD, Phone:  [phone]    Blood Culture With SUSHIL - Blood, Arm, Left [[id number]]  (Normal) Collected: 05/05/23 1959    Specimen: Blood from Arm, Left Updated: 05/08/23 2015     Blood Culture No  growth at 3 days    Comprehensive Metabolic Panel [[id number]]  (Abnormal) Collected: 05/08/23 0455    Specimen: Blood Updated: 05/08/23 0619     Glucose 87 mg/dL      BUN 18 mg/dL      Creatinine 0.45 mg/dL      Sodium 150 mmol/L      Potassium 3.3 mmol/L      Comment: Slight hemolysis detected by analyzer. Results may be affected.        Chloride 117 mmol/L      CO2 26.0 mmol/L      Calcium 8.5 mg/dL      Total Protein 4.8 g/dL      Albumin 2.6 g/dL      ALT (SGPT) 48 U/L      AST (SGOT) 40 U/L      Comment: Slight hemolysis detected by analyzer. Results may be affected.        Alkaline Phosphatase 60 U/L      Total Bilirubin 0.6 mg/dL      Globulin 2.2 gm/dL      A/G Ratio 1.2 g/dL      BUN/Creatinine Ratio 40.0     Anion Gap 7.0 mmol/L      eGFR 101.7 mL/min/1.73     Narrative:      GFR Normal >60  Chronic Kidney Disease <60  Kidney Failure <15    The GFR formula is only valid for adults with stable renal function between ages 18 and 70.    CBC & Differential [[id number]]  (Abnormal) Collected: 05/08/23 0401    Specimen: Blood Updated: 05/08/23 0427    Narrative:      The following orders were created for panel order CBC & Differential.  Procedure                               Abnormality         Status                     ---------                               -----------         ------                     CBC Auto Differential[[id number]]        Abnormal            Final result                 Please view results for these tests on the individual orders.    CBC Auto Differential [[id number]]  (Abnormal) Collected: 05/08/23 0401    Specimen: Blood Updated: 05/08/23 0427     WBC 7.40 10*3/mm3      RBC 3.28 10*6/mm3      Hemoglobin 9.5 g/dL      Hematocrit 28.4 %      MCV 86.6 fL      MCH 29.0 pg      MCHC 33.5 g/dL      RDW 14.8 %      RDW-SD 46.7 fl      MPV 11.9 fL      Platelets 77 10*3/mm3      Neutrophil % 79.8 %      Lymphocyte % 10.7 %      Monocyte % 6.4 %      Eosinophil % 2.3 %      Basophil % 0.3 %       Neutrophils, Absolute 5.91 10*3/mm3      Lymphocytes, Absolute 0.79 10*3/mm3      Monocytes, Absolute 0.47 10*3/mm3      Eosinophils, Absolute 0.17 10*3/mm3      Basophils, Absolute 0.02 10*3/mm3     Hemoglobin & Hematocrit, Blood [[id number]]  (Abnormal) Collected: 05/07/23 1547    Specimen: Blood Updated: 05/07/23 1602     Hemoglobin 9.7 g/dL      Hematocrit 28.7 %     Magnesium [[id number]]  (Normal) Collected: 05/07/23 0537    Specimen: Blood Updated: 05/07/23 1254     Magnesium 1.7 mg/dL     Protime-INR [[id number]]  (Normal) Collected: 05/07/23 1156    Specimen: Blood Updated: 05/07/23 1214     Protime 14.2 Seconds      INR 1.09    Hemoglobin & Hematocrit, Blood [[id number]]  (Abnormal) Collected: 05/07/23 1156    Specimen: Blood Updated: 05/07/23 1206     Hemoglobin 9.7 g/dL      Hematocrit 28.0 %     Comprehensive Metabolic Panel [[id number]]  (Abnormal) Collected: 05/07/23 0537    Specimen: Blood Updated: 05/07/23 0642     Glucose 78 mg/dL      BUN 31 mg/dL      Creatinine 0.51 mg/dL      Sodium 153 mmol/L      Potassium 3.1 mmol/L      Chloride 123 mmol/L      CO2 26.0 mmol/L      Calcium 8.3 mg/dL      Total Protein 4.0 g/dL      Albumin 2.4 g/dL      ALT (SGPT) 42 U/L      AST (SGOT) 39 U/L      Alkaline Phosphatase 51 U/L      Total Bilirubin 0.7 mg/dL      Globulin 1.6 gm/dL      A/G Ratio 1.5 g/dL      BUN/Creatinine Ratio 60.8     Anion Gap 4.0 mmol/L      eGFR 98.7 mL/min/1.73     Narrative:      GFR Normal >60  Chronic Kidney Disease <60  Kidney Failure <15    The GFR formula is only valid for adults with stable renal function between ages 18 and 70.    Lipid Panel [[id number]]  (Abnormal) Collected: 05/07/23 0537    Specimen: Blood Updated: 05/07/23 0642     Total Cholesterol 81 mg/dL      Triglycerides 89 mg/dL      HDL Cholesterol 26 mg/dL      LDL Cholesterol  37 mg/dL      VLDL Cholesterol 18 mg/dL      LDL/HDL Ratio 1.43    Narrative:      Cholesterol Reference Ranges  (U.S[place] Department of Health  and Human Services ATP III Classifications)    Desirable          <200 mg/dL  Borderline High    200-239 mg/dL  High Risk          >240 mg/dL      Triglyceride Reference Ranges  (U.S. Department of Health and Human Services ATP III Classifications)    Normal           <150 mg/dL  Borderline High  150-199 mg/dL  High             200-499 mg/dL  Very High        >500 mg/dL    HDL Reference Ranges  (U.S. Department of Health and Human Services ATP III Classifications)    Low     <40 mg/dl (major risk factor for CHD)  High    >60 mg/dl ('negative' risk factor for CHD)        LDL Reference Ranges  (U.S. Department of Health and Human Services ATP III Classifications)    Optimal          <100 mg/dL  Near Optimal     100-129 mg/dL  Borderline High  130-159 mg/dL  High             160-189 mg/dL  Very High        >189 mg/dL    TSH [[id number]]  (Normal) Collected: 05/07/23 0537    Specimen: Blood Updated: 05/07/23 0642     TSH 4.050 uIU/mL     CBC & Differential [[id number]]  (Abnormal) Collected: 05/07/23 0537    Specimen: Blood Updated: 05/07/23 0603    Narrative:      The following orders were created for panel order CBC & Differential.  Procedure                               Abnormality         Status                     ---------                               -----------         ------                     CBC Auto Differential[[id number]]        Abnormal            Final result                 Please view results for these tests on the individual orders.    CBC Auto Differential [[id number]]  (Abnormal) Collected: 05/07/23 0537    Specimen: Blood Updated: 05/07/23 0603     WBC 8.56 10*3/mm3      RBC 2.76 10*6/mm3      Hemoglobin 8.2 g/dL      Hematocrit 23.4 %      MCV 84.8 fL      MCH 29.7 pg      MCHC 35.0 g/dL      RDW 14.2 %      RDW-SD 43.6 fl      MPV 11.5 fL      Platelets 84 10*3/mm3      Neutrophil % 76.6 %      Lymphocyte % 13.8 %      Monocyte % 7.7 %      Eosinophil % 0.9 %      Basophil % 0.2 %       Neutrophils, Absolute 6.55 10*3/mm3      Lymphocytes, Absolute 1.18 10*3/mm3      Monocytes, Absolute 0.66 10*3/mm3      Eosinophils, Absolute 0.08 10*3/mm3      Basophils, Absolute 0.02 10*3/mm3     Hemoglobin A1c [[id number]]  (Abnormal) Collected: 05/07/23 0537    Specimen: Blood Updated: 05/07/23 0603     Hemoglobin A1C 5.70 %     Narrative:      Hemoglobin A1C Ranges:    Increased Risk for Diabetes  5.7% to 6.4%  Diabetes                     >= 6.5%  Diabetic Goal                < 7.0%    Hemoglobin & Hematocrit, Blood [[id number]]  (Abnormal) Collected: 05/07/23 0010    Specimen: Blood Updated: 05/07/23 0018     Hemoglobin 8.7 g/dL      Hematocrit 27.1 %     Hemoglobin & Hematocrit, Blood [[id number]]  (Abnormal) Collected: 05/06/23 1743    Specimen: Blood Updated: 05/06/23 1755     Hemoglobin 9.3 g/dL      Hematocrit 27.6 %     POC Glucose Once [[id number]]  (Abnormal) Collected: 05/06/23 1741    Specimen: Blood Updated: 05/06/23 1752     Glucose 139 mg/dL      Comment: : [id number] See [employer] ID: [id number]       Hemoglobin & Hematocrit, Blood [[id number]]  (Abnormal) Collected: 05/06/23 1224    Specimen: Blood Updated: 05/06/23 1236     Hemoglobin 7.8 g/dL      Hematocrit 23.4 %     Urinalysis, Microscopic Only - Indwelling Urethral Catheter [[id number]]  (Abnormal) Collected: 05/06/23 1000    Specimen: Urine from Indwelling Urethral Catheter Updated: 05/06/23 1020     RBC, UA 3-5 /HPF      WBC, UA 0-2 /HPF      Comment: Urine culture not indicated.        Bacteria, UA None Seen /HPF      Squamous Epithelial Cells, UA 0-2 /HPF      Hyaline Casts, UA 0-2 /LPF      Methodology Automated Microscopy    Urinalysis With Culture If Indicated - Indwelling Urethral Catheter [[id number]]  (Abnormal) Collected: 05/06/23 1000    Specimen: Urine from Indwelling Urethral Catheter Updated: 05/06/23 1020     Color, UA Yellow     Appearance, UA Clear     pH, UA <=5.0     Specific Gravity, UA 1.022     Glucose, UA  Negative     Ketones, UA Negative     Bilirubin, UA Negative     Blood, UA Small (1+)     Protein, UA Negative     Leuk Esterase, UA Negative     Nitrite, UA Negative     Urobilinogen, UA 0.2 E.U./dL    Narrative:      In absence of clinical symptoms, the presence of pyuria, bacteria, and/or nitrites on the urinalysis result does not correlate with infection.    aPTT [[id number]]  (Abnormal) Collected: 05/06/23 0839    Specimen: Blood Updated: 05/06/23 0856     PTT 39.0 seconds     Protime-INR [[id number]]  (Abnormal) Collected: 05/06/23 0839    Specimen: Blood Updated: 05/06/23 0856     Protime 17.2 Seconds      INR 1.38    Hemoglobin & Hematocrit, Blood [[id number]]  (Abnormal) Collected: 05/06/23 0834    Specimen: Blood Updated: 05/06/23 0846     Hemoglobin 9.2 g/dL      Hematocrit 27.1 %         Imaging Results (Last 72 Hours)     ** No results found for the last 72 hours. **              Assessment & Plan       Septic shock    Right lower lobe pneumonia    Intellectual disability    Anemia    Thrombocytopenia    Transaminitis    Hypoxia    Metabolic encephalopathy    Acute blood loss anemia    Hypovolemic shock      Plan acute GI bleed- no blood work ordered for this morning and so I put in the CBC.  I discussed her care today with Dr. Hazel.  I discussed with Dr. Mora regarding coiling if rebleed occurs.  He is aware and on the case on standby      Laine Olsen MD  05/09/23  08:32 CDT

## 2023-05-09 NOTE — THERAPY TREATMENT NOTE
Acute Care - Speech Language Pathology   Swallow Treatment Note [facility]     Patient Name: Jess Ramsey  : 1950  MRN: [id number]  Today's Date: 2023               Admit Date: 2023  ST tx completed. Pt started on puree diet per GI. ST assessed pt with puree, honey, nectar, and thin liquids. Pt repositioned more upright for PO trials but despite repositioning still has lean to left side and head tilt. Slight oral holding noted at times. Pt able to extract nectar and thin liquids via straw. Strong audible swallow noted with thin liquids. Large bolus was obtained. Minimal anterior loss noted with PO trials. No major overt s/s of aspiration noted. Expiratory wheezes observed prior to and intermittently during PO. RN reports pt lungs currently dimiminished and does have occasional expiratory wheezes with exertion. Pt okay to remain on puree diet with thin liquids. Meds crushed in applesauce. Will require assist for feeding. ST to follow and treat for diet toleration.  Bruna Hooks MS-CCC/SLP, CNT 2023 14:19 CDT      Visit Dx:     ICD-10-CM ICD-9-CM   1. Acute blood loss anemia  D62 285.1   2. Hypovolemic shock  R57.1 785.59   3. Oral phase dysphagia  R13.11 787.21   4. Acute gastric ulcer with hemorrhage  K25.0 531.00     Patient Active Problem List   Diagnosis   • Septic shock   • Right lower lobe pneumonia   • Intellectual disability   • Anemia   • Thrombocytopenia   • Transaminitis   • Hypoxia   • Metabolic encephalopathy   • Acute blood loss anemia   • Hypovolemic shock     Past Medical History:   Diagnosis Date   • Gastritis    • Hypertension    • Intellectual disability    • Psychosis    • Right hemiparesis    • Stroke      Past Surgical History:   Procedure Laterality Date   • ENDOSCOPY N/A 2023    Procedure: ESOPHAGOGASTRODUODENOSCOPY WITH ANESTHESIA;  Surgeon: Sherif Villegas MD;  Location: [facility];  Service: Gastroenterology;  Laterality: N/A;  PRE GI BLEED  POST gastric ulcers,ink  and clip   • ENDOSCOPY N/A 5/9/2023    Procedure: ESOPHAGOGASTRODUODENOSCOPY WITH ANESTHESIA;  Surgeon: Sherif Villegas MD;  Location: [facility] ENDOSCOPY;  Service: Gastroenterology;  Laterality: N/A;  Pre: Anemia  Post: Gastric ulcers  David Frazier MD   • HIP SURGERY Right 2019       SLP Recommendation and Plan     SLP Diet Recommendation: puree, thin liquids (05/09/23 1349)                                      Daily Summary of Progress (SLP): progress toward functional goals is good (05/09/23 1349)                     Plan for Continued Treatment (SLP): continue treatment per plan of care (05/09/23 1349)         Plan of Care Reviewed With: patient  Progress: no change  Outcome Evaluation: ST tx completed. Pt started on puree diet per GI. ST assessed pt with puree, honey, nectar, and thin liquids. Pt repositioned more upright for PO trials but despite repositioning still has lean to left side and head tilt. Slight oral holding noted at times. Pt able to extract nectar and thin liquids via straw. Strong audible swallow noted with thin liquids. Large bolus was obtained. Minimal anterior loss noted with PO trials. No major overt s/s of aspiration noted. Expiratory wheezes observed prior to and intermittently during PO. RN reports pt lungs currently dimiminished and does have occasional expiratory wheezes with exertion. Pt okay to remain on puree diet with thin liquids. Meds crushed in applesauce. Will require assist for feeding. ST to follow and treat for diet toleration.      SWALLOW EVALUATION (last 72 hours)     SLP Adult Swallow Evaluation     Row Name 05/09/23 1349 05/08/23 0750                Rehab Evaluation    Document Type therapy note (daily note)  -BN evaluation  -MG       Subjective Information no complaints  -BN no complaints  -MG       Patient Observations alert;cooperative  -BN alert;cooperative;agree to therapy  -MG       Patient/Family/Caregiver Comments/Observations no family present  -BN No  family present.  -MG       Patient Effort good  -BN good  -MG       Symptoms Noted During/After Treatment -- none  -MG          General Information    Patient Profile Reviewed -- yes  -MG       Pertinent History Of Current Problem -- Primary problem: Hypothermia, altered mental status, RLL pneumonia, GI bleed with bleeding ulcer found on endo, per GI OK for clear liquids. Medical history: HTN, intellectual disability, psychosis, R hemiparesis, stroke, lives in a group home.  -MG       Current Method of Nutrition -- NPO  Ok for clears per GI MD  -MG       Precautions/Limitations, Vision -- WFL;for purposes of eval  -MG       Precautions/Limitations, Hearing -- WFL;for purposes of eval  -MG       Prior Level of Function-Communication -- cognitive-linguistic impairment  unsure of baseline  -MG       Prior Level of Function-Swallowing -- unknown  -MG       Plans/Goals Discussed with -- patient;other (see comments);agreed upon  RN Estrella  -MG       Barriers to Rehab -- previous functional deficit;cognitive status  -MG       Patient's Goals for Discharge -- patient could not state  -MG          Pain    Additional Documentation -- Pain Scale: FACES Pre/Post-Treatment (Group)  -MG          Pain Scale: FACES Pre/Post-Treatment    Pain: FACES Scale, Pretreatment 0-->no hurt  -BN 0-->no hurt  -MG       Posttreatment Pain Rating 0-->no hurt  -BN 0-->no hurt  -MG          Oral Motor Structure and Function    Dentition Assessment -- missing teeth;teeth are in poor condition  -MG       Secretion Management -- WNL/WFL  -MG       Mucosal Quality -- moist, healthy  -MG       Volitional Swallow -- unable to elicit  -MG       Volitional Cough -- unable to elicit  -MG          Oral Musculature and Cranial Nerve Assessment    Oral Motor General Assessment -- oral labial or buccal impairment;lingual impairment  -MG       Oral Labial or Buccal Impairment, Detail, Cranial Nerve VII (Facial): -- CN7: Motor Impairment;left labial  droop;reduced ROM;reduced strength bilaterally  worse on L  -MG       Lingual Impairment, Detail. Cranial Nerves IX, XII (Glossopharyngeal and Hypoglossal) -- CN12: Motor Impairment;reduced lingual ROM;reduced strength  -MG          General Eating/Swallowing Observations    Respiratory Support Currently in Use -- nasal cannula  -MG       Eating/Swallowing Skills -- fed by SLP  -MG       Positioning During Eating -- upright in bed;needs frequent re-positioning  -MG       Utensils Used -- spoon  -MG       Consistencies Trialed -- pureed;honey-thick liquids;nectar/syrup-thick liquids  -MG          Clinical Swallow Eval    Oral Prep Phase -- impaired  -MG       Oral Transit -- impaired  -MG       Oral Residue -- impaired  -MG       Pharyngeal Phase -- no overt signs/symptoms of pharyngeal impairment  -MG       Esophageal Phase -- unremarkable  -MG       Clinical Swallow Evaluation Summary -- See note  -MG          Oral Prep Concerns    Oral Prep Concerns -- incomplete or weak lip closure around spoon;anterior loss;oral holding  -MG       Oral Holding -- all consistencies  -MG       Incomplete or Weak Lip Closure Around Spoon -- all consistencies  -MG       Anterior Loss -- nectar;honey  -MG          Oral Transit Concerns    Oral Transit Concerns -- delayed initiation of bolus transit  -MG       Delayed Intiation of Bolus Transit -- all consistencies  -MG          Oral Residue Concerns    Oral Residue Concerns -- diffuse residue throughout oral cavity  -MG       Diffuse Residue Throughout Oral Cavity -- all consistencies  -MG       Oral Residue Concerns, Comment -- cleared with subsequent swallows  -MG          SLP Evaluation Clinical Impression    SLP Swallowing Diagnosis -- suspect chronic;mod-severe;oral dysphagia;R/O pharyngeal dysphagia  -MG       Functional Impact -- risk of aspiration/pneumonia;risk of malnutrition;risk of dehydration  -MG       Rehab Potential/Prognosis, Swallowing -- adequate, monitor progress  closely  -MG       Swallow Criteria for Skilled Therapeutic Interventions Met -- demonstrates skilled criteria  -MG          SLP Treatment Clinical Impressions    Daily Summary of Progress (SLP) progress toward functional goals is good  -BN --       Barriers to Overall Progress (SLP) Cognitive status  -BN --       Plan for Continued Treatment (SLP) continue treatment per plan of care  -BN --          Recommendations    Therapy Frequency (Swallow) -- PRN  -MG       Predicted Duration Therapy Intervention (Days) -- until discharge  -MG       SLP Diet Recommendation puree;thin liquids  -BN clear liquid diet  per MD  -MG       Recommended Diagnostics -- reassess via clinical swallow evaluation  -MG       Recommended Precautions and Strategies -- upright posture during/after eating;small bites of food and sips of liquid;check mouth frequently for oral residue/pocketing;general aspiration precautions;fatigue precautions;1:1 supervision;assist with feeding;reflux precautions  -MG       Oral Care Recommendations -- Oral Care before breakfast, after meals and PRN;Suction toothbrush  -MG       SLP Rec. for Method of Medication Administration -- meds crushed;with puree;as tolerated  -MG       Monitor for Signs of Aspiration -- yes;notify SLP if any concerns  -MG       Anticipated Discharge Disposition (SLP) -- unknown  -MG          Swallow Goals (SLP)    Swallow LTGs Swallow Long Term Goal (free text)  -BN Swallow Long Term Goal (free text)  -MG       Swallow STGs diet tolerance goal selection (SLP)  -BN diet tolerance goal selection (SLP)  -MG       Diet Tolerance Goal Selection (SLP) Patient will tolerate trials of  -BN Patient will tolerate trials of  -MG          (LTG) Swallow    (LTG) Swallow Patient will tolerate least restrictive diet without s/s of aspiration.  -BN Patient will tolerate least restrictive diet without s/s of aspiration.  -MG       [place] (Swallow Long Term Goal) independently (over 90% accuracy)   -BN independently (over 90% accuracy)  -MG       Time Frame (Swallow Long Term Goal) by discharge  -BN by discharge  -MG       Barriers (Swallow Long Term Goal) none  -BN none  -MG       Progress/Outcomes (Swallow Long Term Goal) continuing progress toward goal  -BN new goal  -MG          (STG) Patient will tolerate trials of    Consistencies Trialed (Tolerate trials) pureed textures;thin liquids  -BN pureed textures;thin liquids  -MG       Desired Outcome (Tolerate trials) without signs/symptoms of aspiration;without signs of distress;with adequate oral prep/transit/clearance;with use of compensatory strategies (see comments)  -BN without signs/symptoms of aspiration;without signs of distress;with adequate oral prep/transit/clearance;with use of compensatory strategies (see comments)  liquid presentation on strong side, positioning during PO  -MG       [place] (Tolerate trials) independently (over 90% accuracy)  -BN independently (over 90% accuracy)  -MG       Time Frame (Tolerate trials) by discharge  -BN by discharge  -MG       Progress/Outcomes (Tolerate trials) continuing progress toward goal  -BN new goal  -MG             User Key  (r) = Recorded By, (t) = Taken By, (c) = Cosigned By    Initials Name Effective Dates    Bruna Remy, MS-CCC/SLP, [facility] 06/15/22 -     MG Monika Bailey MS CCC-SLP 08/12/22 -                 EDUCATION  The patient has been educated in the following areas:   Dysphagia (Swallowing Impairment) Modified Diet Instruction.        SLP GOALS     Row Name 05/09/23 1349 05/08/23 0750          (LTG) Swallow    (LTG) Swallow Patient will tolerate least restrictive diet without s/s of aspiration.  -BN Patient will tolerate least restrictive diet without s/s of aspiration.  -MG     [place] (Swallow Long Term Goal) independently (over 90% accuracy)  -BN independently (over 90% accuracy)  -MG     Time Frame (Swallow Long Term Goal) by discharge  -BN by discharge  -MG      Barriers (Swallow Long Term Goal) none  -BN none  -MG     Progress/Outcomes (Swallow Long Term Goal) continuing progress toward goal  -BN new goal  -MG        (STG) Patient will tolerate trials of    Consistencies Trialed (Tolerate trials) pureed textures;thin liquids  -BN pureed textures;thin liquids  -MG     Desired Outcome (Tolerate trials) without signs/symptoms of aspiration;without signs of distress;with adequate oral prep/transit/clearance;with use of compensatory strategies (see comments)  -BN without signs/symptoms of aspiration;without signs of distress;with adequate oral prep/transit/clearance;with use of compensatory strategies (see comments)  liquid presentation on strong side, positioning during PO  -MG     [place] (Tolerate trials) independently (over 90% accuracy)  -BN independently (over 90% accuracy)  -MG     Time Frame (Tolerate trials) by discharge  -BN by discharge  -MG     Progress/Outcomes (Tolerate trials) continuing progress toward goal  -BN new goal  -MG           User Key  (r) = Recorded By, (t) = Taken By, (c) = Cosigned By    Initials Name Provider Type    Bruna Remy MS-CCC/SLP, [facility] Speech and Language Pathologist    MG Monika Bailey MS CCC-SLP Speech and Language Pathologist                   Time Calculation:    Time Calculation- SLP     Row Name 05/09/23 1418             Time Calculation- SLP    SLP Start Time 1349  -BN      SLP Stop Time 1419  -      SLP Time Calculation (min) 30 min  -BN      SLP Received On 05/09/23  -BN         Untimed Charges    43280-VI Treatment Swallow Minutes 30  -BN         Total Minutes    Untimed Charges Total Minutes 30  -BN       Total Minutes 30  -BN            User Key  (r) = Recorded By, (t) = Taken By, (c) = Cosigned By    Initials Name Provider Type    Bruna Remy MS-CCC/SLP, [facility] Speech and Language Pathologist                Therapy Charges for Today     Code Description Service Date Service Provider  Modifiers Qty    [id number] HC ST TREATMENT SWALLOW 2 5/9/2023 Bruna Hooks, MS-CCC/SLP, CNT GN 1               Bruna Bozrah, MS-CCC/SLP, CNT  5/9/2023

## 2023-05-09 NOTE — PROGRESS NOTES
[facility] Medicine Services  INPATIENT PROGRESS NOTE    Patient Name: Jess Ramsey  Date of Admission: 5/5/2023  Today's Date: 05/09/23  Length of Stay: 4  Primary Care Physician: David Frazeir MD    Subjective   Chief Complaint: Altered mental status/pneumonia/GI    HPI   Patient is on 2 L . Hyponatremia resolved, DC free water.  Hypokalemia improving, continue p.o. potassium.  Dehydration improved.  Anemia stable.  Thrombocytopenia stable.    Review of Systems   Unable to obtain due to difficult to communicate, patient is unable to talk, chronic condition.  All pertinent negatives and positives are as above. All other systems have been reviewed and are negative unless otherwise stated.     Objective    Temp:  [97.4 °F (36.3 °C)-98.9 °F (37.2 °C)] 97.8 °F (36.6 °C)  Heart Rate:  [73-84] 73  Resp:  [16-20] 16  BP: (108-163)/() 112/94  Physical Exam  Vitals and nursing note reviewed.   Constitutional:       Comments: Chronically ill.   Nonverbal  HENT:      Head: Normocephalic.   Eyes:      Conjunctiva/sclera: Conjunctivae normal.      Pupils: Pupils are equal, round, and reactive to light.   Neck:      Vascular: No JVD.   Cardiovascular:      Rate and Rhythm: Normal rate and regular rhythm.      Heart sounds: Normal heart sounds.   Pulmonary:      Effort: No respiratory distress.      Breath sounds: Rhonchi present. No wheezing or rales.      Comments: Patient is on 2 L of oxygen, rhonchi bilateral bases.  Chest:      Chest wall: No tenderness.   Abdominal:      General: Bowel sounds are normal. There is no distension.      Palpations: Abdomen is soft.      Tenderness: There is no abdominal tenderness.   Musculoskeletal:         General: No tenderness or deformity.      Cervical back: Neck supple.  Arthritis of the hand with deformity.  Skin:     General: Skin is warm and dry.      Capillary Refill: Capillary refill takes 2 to 3 seconds.      Findings: No rash.    Neurological:      Cranial Nerves: No cranial nerve deficit.      Motor: Weakness present. No abnormal muscle tone.      Coordination: Coordination abnormal.      Gait: Gait abnormal.      Deep Tendon Reflexes: Reflexes normal.       Results Review:  I have reviewed the labs, radiology results, and diagnostic studies.    Laboratory Data:   Results from last 7 days   Lab Units 05/09/23  0845 05/08/23  0401 05/07/23  1547 05/07/23  1156 05/07/23  0537   WBC 10*3/mm3 7.43 7.40  --   --  8.56   HEMOGLOBIN g/dL 9.0* 9.5* 9.7*   < > 8.2*   HEMATOCRIT % 27.2* 28.4* 28.7*   < > 23.4*   PLATELETS 10*3/mm3 72* 77*  --   --  84*    < > = values in this interval not displayed.        Results from last 7 days   Lab Units 05/08/23  0455 05/07/23  0537 05/06/23  0400 05/05/23  2000   SODIUM mmol/L 150* 153* 147* 144   POTASSIUM mmol/L 3.3* 3.1* 3.6 3.9   CHLORIDE mmol/L 117* 123* 120* 114*   CO2 mmol/L 26.0 26.0 19.0* 25.0   BUN mg/dL 18 31* 52* 56*   CREATININE mg/dL 0.45* 0.51* 0.62 0.83   CALCIUM mg/dL 8.5* 8.3* 7.5* 7.8*   BILIRUBIN mg/dL 0.6 0.7  --  0.3   ALK PHOS U/L 60 51  --  53   ALT (SGPT) U/L 48* 42*  --  54*   AST (SGOT) U/L 40* 39*  --  39*   GLUCOSE mg/dL 87 78 156* 145*       Culture Data:   Blood Culture   Date Value Ref Range Status   05/05/2023 No growth at 3 days  Preliminary       Radiology Data:   Imaging Results (Last 24 Hours)     ** No results found for the last 24 hours. **          I have reviewed the patient's current medications.     Assessment/Plan   Assessment  Active Hospital Problems    Diagnosis    • **Septic shock    • Right lower lobe pneumonia    • Intellectual disability    • Anemia    • Thrombocytopenia    • Transaminitis    • Hypoxia    • Metabolic encephalopathy    • Acute blood loss anemia    • Hypovolemic shock        Treatment Plan  \[facility]\"" . Patient will be admitted at [facility].  She is transferred here from [facility] in [facility].  Her primary care  provider had her in the hospital there for the last 2 days.  She presented with hypothermia and altered mental status.  He thought at first she was just dehydrated and then she ultimately developed a right lower lobe pneumonia and some hypoxemia after initially having clear chest imaging and a normal urinalysis.  She became hypotensive.  A central line was placed and she was started on norepinephrine and sent here.    GI bleed.  Black tarry stool improving.  GI consult.  Hemoglobin stable.  Protonix drip.  EGD- 2 subcentimeter fusiform ulcers with overlying eschar and dark adherent clot- 2 clip was placed, epinephrine was injected, tattoo ink.  GI recommendation- possible EGD at the 72 hours.  General surgery recommendation- potentially could be coil by vascular.  Vascular recommendation- conservative treatment.  If continued bleeding or worsening of hemoglobin and hypotension then to consider coil embolization.    Pneumonia/patient arrives somnolent/hypothermia/altered mental status . Zosyn.  Vancomycin DC 5/6/2023 due to negative MRSA screen..  Chest x-ray-  Right-sided CVL with tip overlying the low SVC,  No pneumothorax,  Veiling RIGHT basilar opacity, likely representing small layering pleural effusion and/or atelectasis.  Patient is currently on 2 L of oxygen.     Hypotension.  Resolved.  Levophed DC yesterday 5/6/2023.     Hypernatremia.  Resolved.     Hypokalemia.  P.o. potassium.          Anemia.  Status post 3 units of blood transfusion last night.  Hemoglobin stable.     Thrombocytopenia.    Status post 1 unit of platelets transfusion.  Platelet count today is  77, decreased.       Hyponatremia.    Labs pending. Increase D5 free water with 20 KCl.     Hypokalemia.  P.o. potassium.     Elevated liver enzyme.    Stable.     Buttock wound, stage II.  Wound care consult.      History of stroke and cognitive deficit according to transfer physician.   Patient unable to provide any history.  Chronic right-sided  weakness, nonverbal, patient does ambulate at baseline.     Patient from a group home in [facility].     Nutrition .  Full liquid diet.     Deconditioning . PT and OT consult     Blood culture with SUSHIL-  no growth in 3 days.  Legionella antigen-negative.  MRSA screen-negative.  Strep pneumo-negative.       Patient transferred from [facility].   Patient is a [facility].  [id number] [place] emergency contact, [facility].   Patient lives in [facility].  At baseline patient get around a wheelchair, unable to use spoon to feed herself, she can  food with her hand and had to be spoon fed. Behavior wise baseline aggressive and yelling, this has been better with change in psych medications.      Palliative care consult . Consult  for nursing home placement.     Medical Decision Making  Number and Complexity of problems: Hypotension/pneumonia/GI bleed/anemia/failure to thrive/buttock wound/hyponatremia  Differential Diagnosis: None.     Conditions and Status        Improving.     MDM Data  External documents reviewed: Previous note .  Cardiac tracing (EKG, telemetry) interpretation: Sinus .  Radiology interpretation: X-ray .  Labs reviewed: Laboratory  Any tests that were considered but not ordered: Lab now and in AM.     Decision rules/scores evaluated (example [id number], [place], etc): None     Discussed with: Nursing and patient.     Care Planning  Shared decision making: Nursing and patient  Code status and discussions: Full code.  [place]     Disposition  Social Determinants of Health that impact treatment or disposition: Patient is from a group home, [place].  2 to 6 days.  Electronically signed by Gallo Hazel MD, 05/09/23, 10:21 CDT.

## 2023-05-09 NOTE — CASE MANAGEMENT/SOCIAL WORK
Continued Stay Note   [place]     Patient Name: Jess Ramsey  MRN: [id number]  Today's Date: 5/9/2023    Admit Date: 5/5/2023    Plan: SNF Referrals   Discharge Plan     Row Name 05/09/23 0923       Plan    Plan SNF Referrals    Plan Comments Patient's guardian agrees that patient will need SNF placement at discharge. PT and OT evals have been ordered. SW did fax referrals to the following facilities: [facility], [facility], [facility], and [facility]. Will fax PT/OT notes as soon as they become available.                 EMMANUELLE Molina

## 2023-05-09 NOTE — PLAN OF CARE
Goal Outcome Evaluation:  Plan of Care Reviewed With: patient        Progress: no change  Outcome Evaluation: VSS. Pt went for repeat EGD today with Dr. Villegas. See results. NSR 75-85. Pt shows no s/s of pain this shift. Andi protocol in place. Protonix gtt cont. Plans to go to SNF at [place]. PT/OT consulted this shift. Wound care done per orders. Safety maintained. Will cont to monitor and call MD with any changes.

## 2023-05-09 NOTE — ANESTHESIA POSTPROCEDURE EVALUATION
Patient: Jess Ramsey    Procedure Summary     Date: 05/09/23 Room / Location: [facility] ENDOSCOPY 2 / [facility] ENDOSCOPY    Anesthesia Start: 1215 Anesthesia Stop: 1228    Procedure: ESOPHAGOGASTRODUODENOSCOPY WITH ANESTHESIA Diagnosis:       Acute blood loss anemia      Hypovolemic shock      (Acute blood loss anemia [D62])      (Hypovolemic shock [R57.1])    Surgeons: Sherif Villegas MD Provider: David Mtz CRNA    Anesthesia Type: MAC ASA Status: 4 - Emergent          Anesthesia Type: MAC    Vitals  Vitals Value Taken Time   BP 75/41 05/09/23 1227   Temp     Pulse 70 05/09/23 1229   Resp     SpO2 94 % 05/09/23 1229   Vitals shown include unvalidated device data.        Post Anesthesia Care and Evaluation    Patient location during evaluation: PHASE II  Patient participation: complete - patient participated  Level of consciousness: awake  Pain score: 0  Pain management: adequate    Airway patency: patent  Anesthetic complications: No anesthetic complications  PONV Status: none  Cardiovascular status: acceptable  Respiratory status: acceptable  Hydration status: acceptable

## 2023-05-09 NOTE — PLAN OF CARE
Goal Outcome Evaluation:              Outcome Evaluation: NTN follow up. Pt out of room at time of attempted visit. Pureed food approved by GI. Added Magic Cup with all meals. NTN following per protocol.

## 2023-05-09 NOTE — OP NOTE
ESOPHAGOGASTRODUODENOSCOPY    Date:  5/9/2023    Indications:   1.  Acute blood loss anemia  2.  Gastric ulcer       Procedure: ESOPHAGOGASTRODUODENOSCOPY     Sedation: As per anesthesia.    Surgeon: Sherif Villegas MD    Anesthesia: Monitored Anesthesia Care     Procedure  Description: After informed consent was obtained, a timeout was called in the endoscopy suite to confirm the correct patient and appropriate procedure.  Thereafter with the patient in the left lateral position, esophagus was intubated under direct vision with adult Olympus gastroscope.  Thereafter scope was slowly advanced into the second portion of the duodenum under direct vision.  Careful examination of the duodenum, stomach and esophagus was performed while slowly withdrawing the scope.  Retroflex examination of the gastric cardia and fundus were also performed.    Findings:   Esophagus:  Normal esophageal body mucosa.  Normal Z-line without esophagitis endoscopically.  No Breanne-Wang tear.  No varices.    Stomach:  Originally described group of ulcers revisualized with all clips still attached.  Surrounding petechiae.  The largest ulcer revealed that the clot had migrated and the underlying ulcer revealed a 3 mm visible vessel starting to epithelialize.  2 large hemoclips placed directly over vessel with excellent positioning.  No residual bleeding noted.  No other high risk stigmata noted.  No blood seen.    Duodenum:  Bulbar duodenitis.  No blood seen.  Bile noted.    Complications: No immediate complications.    Recommendations:   1.  Advance to puréed diet  2.  Pantoprazole IV while in the hospital then change to pantoprazole p.o. twice daily until follow-up EGD  3.  Patient should have repeat EGD in 4 to 6 weeks to confirm ulcer healing.  Will arrange with office  4.  Avoid NSAIDs/aspirin if possible     Procedure CPT code: 42989    Post-Op Diagnosis Codes:     * Acute blood loss anemia [D62]     * Hypovolemic shock [R57.1]    Sherif BELLE  MD Bakari

## 2023-05-09 NOTE — PROGRESS NOTES
[facility] Palliative Care Services    Palliative Care Daily Progress Note   Chief complaint-follow up support for patient/family    Code Status:   Code Status and Medical Interventions:   Ordered at: 05/05/23 1910     Code Status (Patient has no pulse and is not breathing):    CPR (Attempt to Resuscitate)     Medical Interventions (Patient has pulse or is breathing):    Full Support     Comments:    Records from [place]      Advanced Directives: Advance Directive Status: Patient does not have advance directive   Goals of Care: Ongoing.     S: Medical record reviewed. Events noted. Laying in bed without distress. Alert. Speech garbled. Hemoglobin stable 9.0 from 9.5. Platelets low but stable. Vascular surgery evaluated and plans to remain conservative, but possible coil embolization if bleeding reoccurs.  Plan for EGD today per GI. Anticipating SNF at discharge per SW note.     Review of Systems   Unable to perform ROS: acuity of condition     Pain Assessment  CPOT and PAINAD Scales: CPOT (Critical-Care Pain Observation Tool)  CPOT Facial Expression: 0-->relaxed, neutral  CPOT Body Movements: 0-->absence of movements  CPOT Muscle Tension: 0-->relaxed  Ventilator Compliance/Vocalization: 0-->talking in normal tone or no sound  CPOT Score: 0    O:     Intake/Output Summary (Last 24 hours) at 5/9/2023 1017  Last data filed at 5/8/2023 2340  Gross per 24 hour   Intake 225.4 ml   Output 725 ml   Net -499.6 ml       Diagnostics and current medications: Reviewed.    Current Facility-Administered Medications   Medication Dose Route Frequency Provider Last Rate Last Admin   • acetaminophen (TYLENOL) tablet 650 mg  650 mg Oral Q4H PRN Gallo Hazel MD        Or   • acetaminophen (TYLENOL) 160 MG/5ML solution 650 mg  650 mg Oral Q4H PRN Gallo Hazel MD        Or   • acetaminophen (TYLENOL) suppository 650 mg  650 mg Rectal Q4H PRN Gallo Hazel MD       • collagenase ointment 1 application  1  "application Topical Q12H Valdez Estrella R, APRN   1 application at 05/09/23 1011   • dextrose 5 % with KCl 20 mEq/L infusion  75 mL/hr Intravenous Continuous Gallo Hazel MD 75 mL/hr at 05/09/23 0255 75 mL/hr at 05/09/23 0255   • ipratropium-albuterol (DUO-NEB) nebulizer solution 3 mL  3 mL Nebulization Q6H PRN Gallo Hazel MD       • Morphine sulfate (PF) injection 1 mg  1 mg Intravenous Q4H PRN Gallo Hazel MD   1 mg at 05/07/23 0016   • ondansetron (ZOFRAN) injection 4 mg  4 mg Intravenous Q6H PRN Gallo Hazel MD       • pantoprazole (PROTONIX) 40 mg in 100mL NS IVPB  8 mg/hr Intravenous Continuous Gallo Hazel MD 20 mL/hr at 05/09/23 1012 8 mg/hr at 05/09/23 1012   • potassium chloride (MICRO-K) CR capsule 40 mEq  40 mEq Oral Once Gallo Hazel MD       • sodium chloride 0.9 % flush 10 mL  10 mL Intravenous Q12H Gallo Hazel MD   10 mL at 05/08/23 0916   • sodium chloride 0.9 % flush 10 mL  10 mL Intravenous PRN Gallo Hazel MD       • sodium chloride 0.9 % infusion 40 mL  40 mL Intravenous PRN Gallo Hazel MD       • ziprasidone (GEODON) injection 10 mg  10 mg Intramuscular Q12H PRN Gallo Hazel MD   10 mg at 05/07/23 0258     dextrose 5 % with KCl 20 mEq, 75 mL/hr, Last Rate: 75 mL/hr (05/09/23 0255)  pantoprazole, 8 mg/hr, Last Rate: 8 mg/hr (05/09/23 1012)      •  acetaminophen **OR** acetaminophen **OR** acetaminophen  •  ipratropium-albuterol  •  Morphine  •  ondansetron  •  sodium chloride  •  sodium chloride  •  ziprasidone  Attest that current medications reviewed including but not limited to prescriptions, over-the counter, herbals and vitamin/mineral/dietary (nutritional) supplements for name, route of administration, type, dose and frequency and are current using all immediate resources available at time of dictation.    A:    /94 (BP Location: Left arm, Patient Position: Lying)   Pulse 73   Temp 97.8 °F (36.6 °C) (Oral)   Resp 16   Ht 154.9 cm (61\")   Wt 59.6 " kg (131 lb 6.4 oz)   SpO2 98%   BMI 24.83 kg/m²     Vitals and nursing note reviewed.   Constitutional:       Appearance: Ill-appearing and chronically ill-appearing.      Interventions: Nasal cannula in place.   Neck:      Comments: Right IJ  Pulmonary:      Effort: Pulmonary effort is normal.    Cardiovascular:      Normal rate.   Edema:     Peripheral edema present.  Abdominal:      Palpations: Abdomen is soft.   Musculoskeletal:      Cervical back: Neck supple.   Skin:     General: Skin is warm.      Comments: Wounds as document   Genitourinary:     Comments: Bauer catheter in place  Neurological:      Mental Status: Alert.   Psychiatric:      Comments: Garbled speech, intellectual disability      Patient status: Disease state: Controlled with current treatments.  Functional status: Palliative Performance Scale Score: Performance 30% based on the following measures: Ambulation: Totally bed bound, Activity and Evidence of Disease: Unable to do any work, extensive evidence of disease, Self-Care: Total care required,  Intake: Reduced, LOC: Full, drowsy or confusion   Nutritional status: Albumin 2.1 Body mass index is 24.83 kg/m².      Hospital Problem List      Septic shock    Right lower lobe pneumonia    Intellectual disability    Anemia    Thrombocytopenia    Transaminitis    Hypoxia    Metabolic encephalopathy     Impression/Problem List:     1. Septic shock  2. Upper GI bleed secondary to gastric ulcer  3. Acute blood loss anemia  4. Thrombocytopenia  5. Right lower lobe pneumonia  6. Transaminitis  7. Hypoxia  8. Previous CVA with with right hemiparesis  9. Intellectual disability  10. Hypertension, baseline  11. Psychosis  12. Hypoalbuminemia   13. Dysphagia     Recommendations/Plan:  1. plan: Goals of care include CODE STATUS CPR/full support interventions.     Family support: The patient receives support from her Guardian/group home..  Advance Directives: Advance Directive Status: Patient does not have  advance directive   POA/Healthcare surrogate-Macrina Arreoladorian-guardian.     2.  Palliative care encounter  - Prognosis is guarded long-term secondary to septic shock, upper GI bleed secondary to gastric ulcer, acute blood loss anemia, thrombocytopenia, pneumonia, history of CVA, and comorbidities.     -resident of [facility] in [place], [place].  -snow of the [facility] in [place].      -treated with IV fluids and warmed without significant improvement at outlying facility.  Respiratory decline requiring oxygen with findings of right lower lobe pneumonia per chest x-ray.  Developed hypotension and started on norepinephrine and transferred to this facility for higher level of care. Received a dose of vancomycin and Zosyn.    5/6-GI consulted due to melena drop in hemoglobin 6.7.  She received 2 units of PRBCs.  She was noted to have thrombocytopenia and received a unit of platelets and a dose of DDAVP.  She was started on Protonix drip.  She underwent stat EGD by Dr. Villegas on 5/6 with findings of bleeding fusiform ulcers requiring clips and plans to potentially reevaluate with EGD and 72 hours (Tuesday).   -Surgery consulted due to concerns of potential re-bleeding, tough given location would be difficult to treat surgically, though could potentially be coiled by vascular surgery.    5/6-Levophed was able to be weaned off on 5/6.    5/8-Hemoglobin appears stable and currently 9.5 from 8.2 yesterday.    -Vascular surgery consult pending.     5/8-Patient does not currently have a terminal condition or permanently unconscious. Patient does not currently have comorbid conditions in which two (2) or more coexisting medical conditions compromise the chance of recovery or of benefiting from active treatment. Should patient experience an illness or injury or for which there is no reasonable prospect of a cure or recovery, that ultimately will cause the snwo's death if life-sustaining treatment is initiated or continued, and that  imposes severe pain otherwise imposes an inhumane burden on the snow, or continuing life-sustaining treatment provides only minimal medical benefit this would warrant further re-evaluation for potential de-escalation of medical priorities.  -Would recommend to continue current aggressive interventions and monitor effect/outcome for now.    5/9-Plan EDG today to re-evaluate GI bleed post epinephrine/clip success per Dr. Villegas.      Thank you for allowing us to participate in patient's plan of care.    Palliative Care Team will follow patient as needed.     Time spent: 35 minutes spent reviewing medical and medication records, assessing and examining patient, discussing with family, answering questions, providing some guidance about a plan and documentation of care, and coordinating care with other healthcare members, with > 50% time spent face to face.     Ely Hardin, APRN  5/9/2023  10:17 CDT

## 2023-05-09 NOTE — PLAN OF CARE
Goal Outcome Evaluation:  Plan of Care Reviewed With: patient        Progress: no change  Outcome Evaluation: ST tx completed. Pt started on puree diet per GI. ST assessed pt with puree, honey, nectar, and thin liquids. Pt repositioned more upright for PO trials but despite repositioning still has lean to left side and head tilt. Slight oral holding noted at times. Pt able to extract nectar and thin liquids via straw. Strong audible swallow noted with thin liquids. Large bolus was obtained. Minimal anterior loss noted with PO trials. No major overt s/s of aspiration noted. Expiratory wheezes observed prior to and intermittently during PO. RN reports pt lungs currently dimiminished and does have occasional expiratory wheezes with exertion. Pt okay to remain on puree diet with thin liquids. Meds crushed in applesauce. Will require assist for feeding. ST to follow and treat for diet toleration.

## 2023-05-09 NOTE — ANESTHESIA PREPROCEDURE EVALUATION
Anesthesia Evaluation     Nursing notes reviewed                Airway   Mallampati: I  Dental    (+) poor dentition and edentulous    Pulmonary    (+) pneumonia ,   Cardiovascular   Exercise tolerance: poor (<4 METS)    Beta blocker given within 24 hours of surgery    (+) hypertension,     ROS comment: Currently on 0.2 of levophed    Neuro/Psych  (+) CVA residual symptoms,      ROS Comment: Mental retardation.   GI/Hepatic/Renal/Endo      Musculoskeletal     Abdominal    Substance History      OB/GYN          Other   blood dyscrasia anemia,                       Anesthesia Plan    ASA 4 - emergent     MAC     (Pt is a snow of state living in a nursing home. Pt is mentally impaired and unable to provide consent or medical history. Medical history obtained from the chart. Pt off pressors. Was taken emergently this weekend. We will proceed. )  intravenous induction           CODE STATUS:

## 2023-05-09 NOTE — PLAN OF CARE
Goal Outcome Evaluation:  Plan of Care Reviewed With: patient        Progress: no change  Outcome Evaluation: VSS. Pt remains nonverbal, but seems to be in good spirits. Dressings changed per orders. NPO @0000. On 2L nc. NSR 79-83 on tele. Will update MD as needed. Pt does not tolerate being turned to the right due to right IJ.

## 2023-05-10 LAB — BACTERIA SPEC AEROBE CULT: NORMAL

## 2023-05-10 PROCEDURE — 99232 SBSQ HOSP IP/OBS MODERATE 35: CPT | Performed by: NURSE PRACTITIONER

## 2023-05-10 PROCEDURE — 97166 OT EVAL MOD COMPLEX 45 MIN: CPT | Performed by: OCCUPATIONAL THERAPIST

## 2023-05-10 PROCEDURE — 92526 ORAL FUNCTION THERAPY: CPT

## 2023-05-10 PROCEDURE — 97162 PT EVAL MOD COMPLEX 30 MIN: CPT | Performed by: PHYSICAL THERAPIST

## 2023-05-10 PROCEDURE — 99231 SBSQ HOSP IP/OBS SF/LOW 25: CPT | Performed by: SPECIALIST

## 2023-05-10 RX ORDER — POTASSIUM CHLORIDE 750 MG/1
40 CAPSULE, EXTENDED RELEASE ORAL 2 TIMES DAILY WITH MEALS
Status: COMPLETED | OUTPATIENT
Start: 2023-05-10 | End: 2023-05-10

## 2023-05-10 RX ADMIN — COLLAGENASE SANTYL 1 APPLICATION: 250 OINTMENT TOPICAL at 20:13

## 2023-05-10 RX ADMIN — POTASSIUM CHLORIDE 40 MEQ: 10 CAPSULE, COATED, EXTENDED RELEASE ORAL at 17:19

## 2023-05-10 RX ADMIN — PANTOPRAZOLE SODIUM 8 MG/HR: 40 INJECTION, POWDER, FOR SOLUTION INTRAVENOUS at 20:13

## 2023-05-10 RX ADMIN — PANTOPRAZOLE SODIUM 8 MG/HR: 40 INJECTION, POWDER, FOR SOLUTION INTRAVENOUS at 09:43

## 2023-05-10 RX ADMIN — POTASSIUM CHLORIDE 40 MEQ: 10 CAPSULE, COATED, EXTENDED RELEASE ORAL at 09:38

## 2023-05-10 RX ADMIN — Medication 10 ML: at 20:13

## 2023-05-10 RX ADMIN — PANTOPRAZOLE SODIUM 8 MG/HR: 40 INJECTION, POWDER, FOR SOLUTION INTRAVENOUS at 02:42

## 2023-05-10 RX ADMIN — COLLAGENASE SANTYL 1 APPLICATION: 250 OINTMENT TOPICAL at 09:38

## 2023-05-10 RX ADMIN — PANTOPRAZOLE SODIUM 8 MG/HR: 40 INJECTION, POWDER, FOR SOLUTION INTRAVENOUS at 15:00

## 2023-05-10 NOTE — PLAN OF CARE
Goal Outcome Evaluation:  Plan of Care Reviewed With: patient        Progress: no change  Outcome Evaluation: See note

## 2023-05-10 NOTE — PROGRESS NOTES
[facility] Medicine Services  INPATIENT PROGRESS NOTE    Patient Name: Jess Ramsey  Date of Admission: 5/5/2023  Today's Date: 05/10/23  Length of Stay: 5  Primary Care Physician: David Frazier MD    Subjective   Chief Complaint: Altered mental status/pneumonia/GI    HPI   Patient is on 2 L . lab this morning is pending.  Blood pressure stable, afebrile.    Review of Systems   Unable to obtain due to difficult to communicate, patient is unable to talk, chronic condition.  All pertinent negatives and positives are as above. All other systems have been reviewed and are negative unless otherwise stated.     Objective    Temp:  [97.5 °F (36.4 °C)-98.8 °F (37.1 °C)] 98.6 °F (37 °C)  Heart Rate:  [] 98  Resp:  [14-20] 20  BP: (102-144)/(53-99) 117/76  Physical Exam  Vitals and nursing note reviewed.   Constitutional:       Comments: Chronically ill.   Nonverbal  HENT:      Head: Normocephalic.   Eyes:      Conjunctiva/sclera: Conjunctivae normal.      Pupils: Pupils are equal, round, and reactive to light.   Neck:      Vascular: No JVD.   Cardiovascular:      Rate and Rhythm: Normal rate and regular rhythm.      Heart sounds: Normal heart sounds.   Pulmonary:      Effort: No respiratory distress.      Breath sounds: Rhonchi resolved. No wheezing or rales.      Comments: Patient is on 2 L of oxygen, clear, diminished breath sound bilateral.  Chest:      Chest wall: No tenderness.   Abdominal:      General: Bowel sounds are normal. There is no distension.      Palpations: Abdomen is soft.      Tenderness: There is no abdominal tenderness.   Musculoskeletal:         General: No tenderness or deformity.      Cervical back: Neck supple.  Arthritis of the hand with deformity.  Skin:     General: Skin is warm and dry.      Capillary Refill: Capillary refill takes 2 to 3 seconds.      Findings: No rash.   Neurological:      Cranial Nerves: No cranial nerve deficit.      Motor:  Weakness present. No abnormal muscle tone.      Coordination: Coordination abnormal.      Gait: Gait abnormal.      Deep Tendon Reflexes: Reflexes normal.          Results Review:  I have reviewed the labs, radiology results, and diagnostic studies.    Laboratory Data:   Results from last 7 days   Lab Units 05/09/23  0845 05/08/23  0401 05/07/23  1547 05/07/23  1156 05/07/23  0537   WBC 10*3/mm3 7.43 7.40  --   --  8.56   HEMOGLOBIN g/dL 9.0* 9.5* 9.7*   < > 8.2*   HEMATOCRIT % 27.2* 28.4* 28.7*   < > 23.4*   PLATELETS 10*3/mm3 72* 77*  --   --  84*    < > = values in this interval not displayed.        Results from last 7 days   Lab Units 05/09/23  1049 05/08/23  0455 05/07/23  0537 05/06/23  0400 05/05/23  2000   SODIUM mmol/L 139 150* 153*   < > 144   POTASSIUM mmol/L 3.4* 3.3* 3.1*   < > 3.9   CHLORIDE mmol/L 107 117* 123*   < > 114*   CO2 mmol/L 24.0 26.0 26.0   < > 25.0   BUN mg/dL 6* 18 31*   < > 56*   CREATININE mg/dL 0.35* 0.45* 0.51*   < > 0.83   CALCIUM mg/dL 8.1* 8.5* 8.3*   < > 7.8*   BILIRUBIN mg/dL  --  0.6 0.7  --  0.3   ALK PHOS U/L  --  60 51  --  53   ALT (SGPT) U/L  --  48* 42*  --  54*   AST (SGOT) U/L  --  40* 39*  --  39*   GLUCOSE mg/dL 117* 87 78   < > 145*    < > = values in this interval not displayed.       Culture Data:   Blood Culture   Date Value Ref Range Status   05/05/2023 No growth at 4 days  Preliminary       Radiology Data:   Imaging Results (Last 24 Hours)     ** No results found for the last 24 hours. **          I have reviewed the patient's current medications.     Assessment/Plan   Assessment  Active Hospital Problems    Diagnosis    • **Septic shock    • Right lower lobe pneumonia    • Intellectual disability    • Anemia    • Thrombocytopenia    • Transaminitis    • Hypoxia    • Metabolic encephalopathy    • Acute blood loss anemia    • Hypovolemic shock        Treatment Plan  HPI . Patient will be admitted at [facility].  She is transferred here from [facility] in [facility].  Her primary care provider had her in the hospital there for the last 2 days.  She presented with hypothermia and altered mental status.  He thought at first she was just dehydrated and then she ultimately developed a right lower lobe pneumonia and some hypoxemia after initially having clear chest imaging and a normal urinalysis.  She became hypotensive.  A central line was placed and she was started on norepinephrine and sent here.     GI bleed.  Black tarry stool improving.  GI consult.  Hemoglobin stable.  Protonix drip.  EGD 5/6/2023- 2 subcentimeter fusiform ulcers with overlying eschar and dark adherent clot- 2 clip was placed, epinephrine was injected, tattoo ink.  Second EGD 5/9/2023- original described ulcers visualized- all clips still attached-surrounding petechiae, large also reveals blood clot has migrated and underlying ulcer revealed 3 mm visible vessels starting to epithelialize-no residual bleeding-no other high risk stigmata-no blood seen, duodenum-bulbar duodenitis-no blood seen-bile noted.  Vascular recommendation- conservative treatment.  If continued bleeding or worsening of hemoglobin and hypotension then to consider coil embolization.  General surgery recommendation- advance diet to purée, IV Protonix while in the hospital-change to p.o. Protonix twice a day until follow-up EGD, EGD in 4 to 6 weeks to confirm ulcer healing-we will arrange an office, avoid aspirin and NSAID.    Pneumonia/patient arrives somnolent/hypothermia/altered mental status . Zosyn.  Vancomycin DC 5/6/2023 due to negative MRSA screen..  Chest x-ray-  Right-sided CVL with tip overlying the low SVC,  No pneumothorax,  Veiling RIGHT basilar opacity, likely representing small layering pleural effusion and/or atelectasis.  Patient is currently on 2 L of oxygen.     Hypotension.  Resolved.  Levophed DC yesterday 5/6/2023.     Hypernatremia.  Resolved.     Hypokalemia.  P.o.  potassium.     Anemia.  Status post 3 units of blood transfusion last night.  Hemoglobin stable.     Thrombocytopenia.    Status post 1 unit of platelets transfusion.  Platelet count today is 72, stable.     Elevated liver enzyme.    Stable.     Buttock wound, stage II.  Wound care consult.      History of stroke and cognitive deficit according to transfer physician.   Patient unable to provide any history.  Chronic right-sided weakness, nonverbal, patient does ambulate at baseline.     Patient from a group home in [facility].     Nutrition .  Full liquid diet.     Deconditioning . PT and OT consult     Blood culture with SUSHIL-  no growth in 3 days.  Legionella antigen-negative.  MRSA screen-negative.  Strep pneumo-negative.       Patient transferred from [facility].   Patient is a snow of the [facility].  [phone] [place] emergency contact, snow of [facility].   Patient lives in [facility].  At baseline patient get around a wheelchair, unable to use spoon to feed herself, she can  food with her hand and had to be spoon fed. Behavior wise baseline aggressive and yelling, this has been better with change in psych medications.      Palliative care consult . Consult  for nursing home placement.     Medical Decision Making  Number and Complexity of problems: Hypotension/pneumonia/GI bleed/anemia/failure to thrive/buttock wound/hyponatremia  Differential Diagnosis: None.     Conditions and Status        Improving.     [facility] Data  External documents reviewed: Previous note .  Cardiac tracing (EKG, telemetry) interpretation: Sinus .  Radiology interpretation: X-ray .  Labs reviewed: Laboratory  Any tests that were considered but not ordered: Lab now and in AM.     Decision rules/scores evaluated (example [id number], Wells, etc): None     Discussed with: Nursing and patient.     Care Planning  Shared decision making: Nursing and  patient  Code status and discussions: Full code.  [place] [facility] the [facility]     Disposition  Social Determinants of Health that impact treatment or disposition: Patient is from a group home, [place] the [facility].  2 to 6 days.    Electronically signed by Gallo Hazel MD, 05/10/23, 08:58 CDT.

## 2023-05-10 NOTE — PLAN OF CARE
Goal Outcome Evaluation:  Plan of Care Reviewed With: patient           Outcome Evaluation: OT eval completed. Pt presents alert and oriented to self only. Therapist called group home facility, per facility but was typically able to t/f with assist x1 to w/c, self propel w/c through facility, is able to complete self feeding with use of AE but elsewise is dependent for all other adls. Today she demonstrates contractures in digits of B hands with hemiparesis noted in R UE. She produces sounds throughout session but is not able to produce words. She required Max Ax2 for all bed mobility. She was found incontinent of large stool and was Dep for all aspects of toileting. She was required Min-Mod A to maintain static sitting balance at EOB but only tolerate approx 5 minutes of activity. Returned to [facility] at end of session. Pt would benefit from skilled OT service but unsure of her ability to actively participate or retention of therapeutic skills/carryover. Will complete a short trial of therapy. Recommend d/c to SNF.

## 2023-05-10 NOTE — CASE MANAGEMENT/SOCIAL WORK
Continued Stay Note  [facility]     Patient Name: Jess Ramsey  MRN: [id number]  Today's Date: 5/10/2023    Admit Date: 5/5/2023    Plan: SNF Referrals   Discharge Plan     Row Name 05/10/23 0956       Plan    Plan Comments PT has been listed with the following facilities: [facility], [facility], [facility], and [facility]. Will fax PT/OT notes as soon as they become available.               Discharge Codes    No documentation.               Expected Discharge Date and Time     Expected Discharge Date Expected Discharge Time    May 10, 2023             LEE Smith

## 2023-05-10 NOTE — PROGRESS NOTES
[facility], [place], operated by [facility]  Inpatient Progress Note      Date of Admission: 5/5/2023  Date of Service:  05/10/23    Reason for Follow Up: Melena    Subjective     Subjective:     Patient lying in bed, no family present at bedside.  I did discuss her care with nursing who reports 1 dark bowel movement overnight.  Nursing did not report any other adverse events..    Repeat EGD to follow-up on previously identified gastric ulcer        Current Facility-Administered Medications:   •  acetaminophen (TYLENOL) tablet 650 mg, 650 mg, Oral, Q4H PRN **OR** acetaminophen (TYLENOL) 160 MG/5ML solution 650 mg, 650 mg, Oral, Q4H PRN **OR** acetaminophen (TYLENOL) suppository 650 mg, 650 mg, Rectal, Q4H PRN, Sherif Villegas MD  •  collagenase ointment 1 application, 1 application, Topical, Q12H, Sherif Villegas MD, 1 application at 05/09/23 2202  •  ipratropium-albuterol (DUO-NEB) nebulizer solution 3 mL, 3 mL, Nebulization, Q6H PRN, Sherif Villegas MD  •  Morphine sulfate (PF) injection 1 mg, 1 mg, Intravenous, Q4H PRN, Sherif Villegas MD, 1 mg at 05/07/23 0016  •  ondansetron (ZOFRAN) injection 4 mg, 4 mg, Intravenous, Q6H PRN, Sherif Villegas MD  •  pantoprazole (PROTONIX) 40 mg in 100mL NS IVPB, 8 mg/hr, Intravenous, Continuous, Sherif Villegas MD, Last Rate: 20 mL/hr at 05/10/23 0242, 8 mg/hr at 05/10/23 0242  •  potassium chloride (MICRO-K) CR capsule 40 mEq, 40 mEq, Oral, BID With Meals, Gallo Hazel MD, 40 mEq at 05/09/23 1741  •  sodium chloride 0.9 % flush 10 mL, 10 mL, Intravenous, Q12H, Sherif Villegas MD, 10 mL at 05/09/23 2202  •  sodium chloride 0.9 % flush 10 mL, 10 mL, Intravenous, PRN, Sherif Villegas MD  •  sodium chloride 0.9 % infusion 40 mL, 40 mL, Intravenous, PRN, Sherif Villegas MD  •  sodium chloride 0.9 % infusion, 100 mL/hr, Intravenous, Continuous, Sherif Villegas MD, Stopped at 05/09/23 1258  •  ziprasidone (GEODON) injection 10 mg, 10 mg, Intramuscular, Q12H PRN, Sherif Villegas MD, 10 mg  at 05/07/23 0258    Review of Systems     Complete review of system unobtainable        Objective     Vital Signs  Temp:  [97.5 °F (36.4 °C)-98.8 °F (37.1 °C)] 98.6 °F (37 °C)  Heart Rate:  [] 98  Resp:  [14-20] 20  BP: (102-144)/(53-99) 117/76  Body mass index is 24.83 kg/m².    Intake/Output Summary (Last 24 hours) at 5/10/2023 0857  Last data filed at 5/10/2023 0453  Gross per 24 hour   Intake 2300 ml   Output 2350 ml   Net -50 ml     No intake/output data recorded.       Physical Exam:   General: patient awake, alert, ill-appearing   Eyes: Normal lids and lashes, no scleral icterus   Neck: supple, normal ROM   Skin: warm and dry, not jaundiced   Cardiovascular: regular rhythm and rate, no murmurs auscultated   Pulm: Rhonchi to auscultation bilaterally, regular and unlabored   Abdomen: soft, nontender, nondistended; normal bowel sounds   Rectal: deferred   Extremities: no rash, contractured hands due to arthritis   Psychiatric: Normal mood and behavior; memory intact         Results Review:    I have reviewed all of the patients current test results  Results from last 7 days   Lab Units 05/09/23  0845 05/08/23  0401 05/07/23  1547 05/07/23  1156 05/07/23  0537   WBC 10*3/mm3 7.43 7.40  --   --  8.56   HEMOGLOBIN g/dL 9.0* 9.5* 9.7*   < > 8.2*   HEMATOCRIT % 27.2* 28.4* 28.7*   < > 23.4*   PLATELETS 10*3/mm3 72* 77*  --   --  84*    < > = values in this interval not displayed.       Results from last 7 days   Lab Units 05/09/23  1049 05/08/23  0455 05/07/23  0537 05/06/23  0400 05/05/23 2000   SODIUM mmol/L 139 150* 153*   < > 144   POTASSIUM mmol/L 3.4* 3.3* 3.1*   < > 3.9   CHLORIDE mmol/L 107 117* 123*   < > 114*   CO2 mmol/L 24.0 26.0 26.0   < > 25.0   BUN mg/dL 6* 18 31*   < > 56*   CREATININE mg/dL 0.35* 0.45* 0.51*   < > 0.83   CALCIUM mg/dL 8.1* 8.5* 8.3*   < > 7.8*   BILIRUBIN mg/dL  --  0.6 0.7  --  0.3   ALK PHOS U/L  --  60 51  --  53   ALT (SGPT) U/L  --  48* 42*  --  54*   AST (SGOT) U/L  --   40* 39*  --  39*   GLUCOSE mg/dL 117* 87 78   < > 145*    < > = values in this interval not displayed.       Results from last 7 days   Lab Units 05/07/23  1156 05/06/23  0839   INR  1.09 1.38*       Lab Results   Lab Value Date/Time    LIPASE 25 08/14/2020 0712    LIPASE 16 07/21/2019 2134       Radiology:    Imaging Results (Last 24 Hours)     ** No results found for the last 24 hours. **            Assessment & Plan     Patient Active Problem List   Diagnosis Code   • Septic shock A41.9, R65.21   • Right lower lobe pneumonia J18.9   • Intellectual disability F79   • Anemia D64.9   • Thrombocytopenia D69.6   • Transaminitis R74.01   • Hypoxia R09.02   • Metabolic encephalopathy G93.41   • Acute blood loss anemia D62   • Hypovolemic shock R57.1       Impression/Plan    Gastric ulcer  Anemia  Thrombocytopenia  Elevated liver enzymes    Hemoglobin currently stable.  Continue IV Protonix while hospitalized then transition to twice daily p.o. PPI post discharge.  She will need repeat EGD to confirm healing of the ulcer.  Recommend she follow-up with GI 2 to 4 weeks post discharge.  She will need to avoid anti-inflammatories as well as aspirin if possible.  Last imaging of liver that I see is from July 2020.  No abnormalities noted to the liver.   If labs remain abnormal post discharge would recommend ultrasound of the liver.      Electronically signed by JOSE Alcala, 05/10/23, 8:49 AM CDT.       JOSE Alcala  05/10/23  08:57 CDT

## 2023-05-10 NOTE — PLAN OF CARE
Goal Outcome Evaluation:  Plan of Care Reviewed With: patient        Progress: no change  Outcome Evaluation: VSS. Pt rested well over night. 1 black/dark stool this shift. S/ST  per tele. safety maintained.

## 2023-05-10 NOTE — THERAPY TREATMENT NOTE
Acute Care - Speech Language Pathology   Swallow Treatment Note [facility]     Patient Name: Jess Ramsey  : 1950  MRN: [id number]  Today's Date: 5/10/2023               Admit Date: 2023  Pt completed multiple trials of thin liquids. She had a delayed cough 1x, however was not in an optimal position, leaning to her left side. NurseLaurita assisted with repositioning pt and pt had no further s/s of aspiration with thin drinks afterward. Also observed pt take meds with applesauce with no overt s/s of aspiration. Recommend continuing a pureed diet and thin liquids at this time. SLP will continue to follow.   Nuvia Goodman, CCC-SLP 5/10/2023 10:00 CDT    Visit Dx:     ICD-10-CM ICD-9-CM   1. Acute blood loss anemia  D62 285.1   2. Hypovolemic shock  R57.1 785.59   3. Oral phase dysphagia  R13.11 787.21   4. Acute gastric ulcer with hemorrhage  K25.0 531.00     Patient Active Problem List   Diagnosis   • Septic shock   • Right lower lobe pneumonia   • Intellectual disability   • Anemia   • Thrombocytopenia   • Transaminitis   • Hypoxia   • Metabolic encephalopathy   • Acute blood loss anemia   • Hypovolemic shock     Past Medical History:   Diagnosis Date   • Gastritis    • Hypertension    • Intellectual disability    • Psychosis    • Right hemiparesis    • Stroke      Past Surgical History:   Procedure Laterality Date   • ENDOSCOPY N/A 2023    Procedure: ESOPHAGOGASTRODUODENOSCOPY WITH ANESTHESIA;  Surgeon: Sherif Villegas MD;  Location: [facility] OR;  Service: Gastroenterology;  Laterality: N/A;  PRE GI BLEED  POST gastric ulcers,ink and clip   • ENDOSCOPY N/A 2023    Procedure: ESOPHAGOGASTRODUODENOSCOPY WITH ANESTHESIA;  Surgeon: Sherif Villegas MD;  Location: [facility] ENDOSCOPY;  Service: Gastroenterology;  Laterality: N/A;  Pre: Anemia  Post: Gastric ulcers  David Frazier MD   • HIP SURGERY Right 2019       SLP Recommendation and Plan                                            Daily  Summary of Progress (SLP): progress towards functional goals is fair (05/10/23 0926)               Treatment Assessment (SLP): continued (05/10/23 0926)  Treatment Assessment Comments (SLP): Continue to follow (05/10/23 0926)  Plan for Continued Treatment (SLP): continue treatment per plan of care (05/10/23 0926)         Plan of Care Reviewed With: patient  Progress: no change  Outcome Evaluation: See note      SWALLOW EVALUATION (last 72 hours)     SLP Adult Swallow Evaluation     Row Name 05/10/23 0926 05/09/23 1349 05/08/23 0750             Rehab Evaluation    Document Type therapy note (daily note)  -MB therapy note (daily note)  -BN evaluation  -MG      Subjective Information --  Difficulty with verbal expression  -MB no complaints  -BN no complaints  -MG      Patient Observations alert;cooperative  -MB alert;cooperative  -BN alert;cooperative;agree to therapy  -MG      Patient/Family/Caregiver Comments/Observations No family present  -MB no family present  -BN No family present.  -MG      Patient Effort adequate  -MB good  -BN good  -MG      Symptoms Noted During/After Treatment -- -- none  -MG         General Information    Patient Profile Reviewed -- -- yes  -MG      Pertinent History Of Current Problem -- -- Primary problem: Hypothermia, altered mental status, RLL pneumonia, GI bleed with bleeding ulcer found on endo, per GI OK for clear liquids. Medical history: HTN, intellectual disability, psychosis, R hemiparesis, stroke, lives in a group home.  -MG      Current Method of Nutrition -- -- NPO  Ok for clears per GI MD  -MG      Precautions/Limitations, Vision -- -- WFL;for purposes of eval  -MG      Precautions/Limitations, Hearing -- -- WFL;for purposes of eval  -MG      Prior Level of Function-Communication -- -- cognitive-linguistic impairment  unsure of baseline  -MG      Prior Level of Function-Swallowing -- -- unknown  -MG      Plans/Goals Discussed with -- -- patient;other (see comments);agreed  upon  RN Estrella  -MG      Barriers to Rehab -- -- previous functional deficit;cognitive status  -MG      Patient's Goals for Discharge -- -- patient could not state  -MG         Pain    Additional Documentation Pain Scale: FACES Pre/Post-Treatment (Group)  -MB -- Pain Scale: FACES Pre/Post-Treatment (Group)  -MG         Pain Scale: FACES Pre/Post-Treatment    Pain: FACES Scale, Pretreatment 0-->no hurt  -MB 0-->no hurt  -BN 0-->no hurt  -MG      Posttreatment Pain Rating -- 0-->no hurt  -BN 0-->no hurt  -MG         Oral Motor Structure and Function    Dentition Assessment -- -- missing teeth;teeth are in poor condition  -MG      Secretion Management -- -- WNL/WFL  -MG      Mucosal Quality -- -- moist, healthy  -MG      Volitional Swallow -- -- unable to elicit  -MG      Volitional Cough -- -- unable to elicit  -MG         Oral Musculature and Cranial Nerve Assessment    Oral Motor General Assessment -- -- oral labial or buccal impairment;lingual impairment  -MG      Oral Labial or Buccal Impairment, Detail, Cranial Nerve VII (Facial): -- -- CN7: Motor Impairment;left labial droop;reduced ROM;reduced strength bilaterally  worse on L  -MG      Lingual Impairment, Detail. Cranial Nerves IX, XII (Glossopharyngeal and Hypoglossal) -- -- CN12: Motor Impairment;reduced lingual ROM;reduced strength  -MG         General Eating/Swallowing Observations    Respiratory Support Currently in Use -- -- nasal cannula  -MG      Eating/Swallowing Skills -- -- fed by SLP  -MG      Positioning During Eating -- -- upright in bed;needs frequent re-positioning  -MG      Utensils Used -- -- spoon  -MG      Consistencies Trialed -- -- pureed;honey-thick liquids;nectar/syrup-thick liquids  -MG         Clinical Swallow Eval    Oral Prep Phase -- -- impaired  -MG      Oral Transit -- -- impaired  -MG      Oral Residue -- -- impaired  -MG      Pharyngeal Phase -- -- no overt signs/symptoms of pharyngeal impairment  -MG      Esophageal Phase --  -- unremarkable  -MG      Clinical Swallow Evaluation Summary -- -- See note  -MG         Oral Prep Concerns    Oral Prep Concerns -- -- incomplete or weak lip closure around spoon;anterior loss;oral holding  -MG      Oral Holding -- -- all consistencies  -MG      Incomplete or Weak Lip Closure Around Spoon -- -- all consistencies  -MG      Anterior Loss -- -- nectar;honey  -MG         Oral Transit Concerns    Oral Transit Concerns -- -- delayed initiation of bolus transit  -MG      Delayed Intiation of Bolus Transit -- -- all consistencies  -MG         Oral Residue Concerns    Oral Residue Concerns -- -- diffuse residue throughout oral cavity  -MG      Diffuse Residue Throughout Oral Cavity -- -- all consistencies  -MG      Oral Residue Concerns, Comment -- -- cleared with subsequent swallows  -MG         SLP Evaluation Clinical Impression    SLP Swallowing Diagnosis -- -- suspect chronic;mod-severe;oral dysphagia;R/O pharyngeal dysphagia  -MG      Functional Impact -- -- risk of aspiration/pneumonia;risk of malnutrition;risk of dehydration  -MG      Rehab Potential/Prognosis, Swallowing -- -- adequate, monitor progress closely  -MG      Swallow Criteria for Skilled Therapeutic Interventions Met -- -- demonstrates skilled criteria  -         SLP Treatment Clinical Impressions    Treatment Assessment (SLP) continued  -MB -- --      Treatment Assessment Comments (SLP) Continue to follow  -MB -- --      Daily Summary of Progress (SLP) progress towards functional goals is fair  -MB progress toward functional goals is good  - --      Barriers to Overall Progress (SLP) Cognitive status  -MB Cognitive status  -BN --      Plan for Continued Treatment (SLP) continue treatment per plan of care  -MB continue treatment per plan of care  - --         Recommendations    Therapy Frequency (Swallow) -- -- PRN  -MG      Predicted Duration Therapy Intervention (Days) -- -- until discharge  -      SLP Diet Recommendation --  puree;thin liquids  -BN clear liquid diet  per MD  -MG      Recommended Diagnostics -- -- reassess via clinical swallow evaluation  -MG      Recommended Precautions and Strategies -- -- upright posture during/after eating;small bites of food and sips of liquid;check mouth frequently for oral residue/pocketing;general aspiration precautions;fatigue precautions;1:1 supervision;assist with feeding;reflux precautions  -MG      Oral Care Recommendations -- -- Oral Care before breakfast, after meals and PRN;Suction toothbrush  -MG      SLP Rec. for Method of Medication Administration -- -- meds crushed;with puree;as tolerated  -MG      Monitor for Signs of Aspiration -- -- yes;notify SLP if any concerns  -MG      Anticipated Discharge Disposition (SLP) -- -- unknown  -MG         Swallow Goals (SLP)    Swallow LTGs -- Swallow Long Term Goal (free text)  -BN Swallow Long Term Goal (free text)  -MG      Swallow STGs -- diet tolerance goal selection (SLP)  -BN diet tolerance goal selection (SLP)  -MG      Diet Tolerance Goal Selection (SLP) -- Patient will tolerate trials of  -BN Patient will tolerate trials of  -MG         (LTG) Swallow    (LTG) Swallow Patient will tolerate least restrictive diet without s/s of aspiration.  -MB Patient will tolerate least restrictive diet without s/s of aspiration.  -BN Patient will tolerate least restrictive diet without s/s of aspiration.  -MG      [place] (Swallow Long Term Goal) independently (over 90% accuracy)  -MB independently (over 90% accuracy)  -BN independently (over 90% accuracy)  -MG      Time Frame (Swallow Long Term Goal) by discharge  -MB by discharge  -BN by discharge  -MG      Barriers (Swallow Long Term Goal) none  -MB none  -BN none  -MG      Progress/Outcomes (Swallow Long Term Goal) continuing progress toward goal  -MB continuing progress toward goal  -BN new goal  -MG         (STG) Patient will tolerate trials of    Consistencies Trialed (Tolerate trials) pureed  textures;thin liquids  -MB pureed textures;thin liquids  -BN pureed textures;thin liquids  -MG      Desired Outcome (Tolerate trials) without signs/symptoms of aspiration;without signs of distress;with adequate oral prep/transit/clearance;with use of compensatory strategies (see comments)  -MB without signs/symptoms of aspiration;without signs of distress;with adequate oral prep/transit/clearance;with use of compensatory strategies (see comments)  -BN without signs/symptoms of aspiration;without signs of distress;with adequate oral prep/transit/clearance;with use of compensatory strategies (see comments)  liquid presentation on strong side, positioning during PO  -MG      [place] (Tolerate trials) independently (over 90% accuracy)  -MB independently (over 90% accuracy)  -BN independently (over 90% accuracy)  -MG      Time Frame (Tolerate trials) by discharge  -MB by discharge  -BN by discharge  -MG      Progress/Outcomes (Tolerate trials) continuing progress toward goal  -MB continuing progress toward goal  -BN new goal  -MG            User Key  (r) = Recorded By, (t) = Taken By, (c) = Cosigned By    Initials Name Effective Dates    Nuvia Hernandez, CCC-SLP 02/03/23 -     Bruna Remy, MS-CCC/SLP, [facility] 06/15/22 -     MG Monika Bailey MS CCC-SLP 08/12/22 -                 EDUCATION  The patient has been educated in the following areas:   Dysphagia (Swallowing Impairment).        SLP GOALS     Row Name 05/10/23 0926 05/09/23 1349 05/08/23 0750       (LTG) Swallow    (LTG) Swallow Patient will tolerate least restrictive diet without s/s of aspiration.  -MB Patient will tolerate least restrictive diet without s/s of aspiration.  -BN Patient will tolerate least restrictive diet without s/s of aspiration.  -MG    [place] (Swallow Long Term Goal) independently (over 90% accuracy)  -MB independently (over 90% accuracy)  -BN independently (over 90% accuracy)  -MG    Time Frame (Swallow Long  Term Goal) by discharge  -MB by discharge  -BN by discharge  -MG    Barriers (Swallow Long Term Goal) none  -MB none  -BN none  -MG    Progress/Outcomes (Swallow Long Term Goal) continuing progress toward goal  -MB continuing progress toward goal  -BN new goal  -MG       (STG) Patient will tolerate trials of    Consistencies Trialed (Tolerate trials) pureed textures;thin liquids  -MB pureed textures;thin liquids  -BN pureed textures;thin liquids  -MG    Desired Outcome (Tolerate trials) without signs/symptoms of aspiration;without signs of distress;with adequate oral prep/transit/clearance;with use of compensatory strategies (see comments)  -MB without signs/symptoms of aspiration;without signs of distress;with adequate oral prep/transit/clearance;with use of compensatory strategies (see comments)  -BN without signs/symptoms of aspiration;without signs of distress;with adequate oral prep/transit/clearance;with use of compensatory strategies (see comments)  liquid presentation on strong side, positioning during PO  -MG    [place] (Tolerate trials) independently (over 90% accuracy)  -MB independently (over 90% accuracy)  -BN independently (over 90% accuracy)  -MG    Time Frame (Tolerate trials) by discharge  -MB by discharge  -BN by discharge  -MG    Progress/Outcomes (Tolerate trials) continuing progress toward goal  -MB continuing progress toward goal  -BN new goal  -MG          User Key  (r) = Recorded By, (t) = Taken By, (c) = Cosigned By    Initials Name Provider Type    Nuvia Hernandez, CCC-SLP Speech and Language Pathologist    Bruna Remy MS-CCC/SLP, [facility] Speech and Language Pathologist    Monika Rush MS CCC-SLP Speech and Language Pathologist                   Time Calculation:    Time Calculation- SLP     Row Name 05/10/23 1000             Time Calculation- SLP    SLP Start Time 0926  -MB      SLP Stop Time 0955  -MB      SLP Time Calculation (min) 29 min  -MB      SLP  Received On 05/10/23  -MB         Untimed Charges    44159-EO Treatment Swallow Minutes 29  -MB         Total Minutes    Untimed Charges Total Minutes 29  -MB       Total Minutes 29  -MB            User Key  (r) = Recorded By, (t) = Taken By, (c) = Cosigned By    Initials Name Provider Type    Nuvia Hernandez CCC-SLP Speech and Language Pathologist                Therapy Charges for Today     Code Description Service Date Service Provider Modifiers Qty    [id number]  ST TREATMENT SWALLOW 2 5/10/2023 Nuvia Goodman CCC-SLP GN 1               SD Saab  5/10/2023

## 2023-05-10 NOTE — PLAN OF CARE
Goal Outcome Evaluation:  Plan of Care Reviewed With: patient        Progress: no change  Outcome Evaluation: No changes. Patient appears to be at her baseline. No s/s of pain. Turn q2h. Mepilex replaced to patient's bottom. One BM this shift. K+ 3.4, 40 meq given BID. SS working on SNF placement at d/c. [place]auer patent/draining, care done. Cont to monitor.

## 2023-05-10 NOTE — THERAPY EVALUATION
Patient Name: Jess Ramsey  : 1950    MRN: [id number]                              Today's Date: 5/10/2023       Admit Date: 2023    Visit Dx:     ICD-10-CM ICD-9-CM   1. Acute blood loss anemia  D62 285.1   2. Hypovolemic shock  R57.1 785.59   3. Oral phase dysphagia  R13.11 787.21   4. Acute gastric ulcer with hemorrhage  K25.0 531.00   5. Impaired mobility and ADLs  Z74.09 V49.89    Z78.9    6. Impaired mobility  Z74.09 799.89     Patient Active Problem List   Diagnosis   • Septic shock   • Right lower lobe pneumonia   • Intellectual disability   • Anemia   • Thrombocytopenia   • Transaminitis   • Hypoxia   • Metabolic encephalopathy   • Acute blood loss anemia   • Hypovolemic shock     Past Medical History:   Diagnosis Date   • Gastritis    • Hypertension    • Intellectual disability    • Psychosis    • Right hemiparesis    • Stroke      Past Surgical History:   Procedure Laterality Date   • ENDOSCOPY N/A 2023    Procedure: ESOPHAGOGASTRODUODENOSCOPY WITH ANESTHESIA;  Surgeon: Sherif Villegas MD;  Location: [facility] OR;  Service: Gastroenterology;  Laterality: N/A;  PRE GI BLEED  POST gastric ulcers,ink and clip   • ENDOSCOPY N/A 2023    Procedure: ESOPHAGOGASTRODUODENOSCOPY WITH ANESTHESIA;  Surgeon: Sherif Villegas MD;  Location: [facility] ENDOSCOPY;  Service: Gastroenterology;  Laterality: N/A;  Pre: Anemia  Post: Gastric ulcers  David Frazier MD   • HIP SURGERY Right 2019      General Information     Row Name 05/10/23 1430          Physical Therapy Time and Intention    Document Type evaluation  -SB     Mode of Treatment physical therapy  -SB     Row Name 05/10/23 1430          General Information    Patient Profile Reviewed yes  -SB     Prior Level of Function dependent:;ADL's;max assist:;transfer;bed mobility;min assist:;w/c or scooter  per facility pt needed assist with t/fs to w/c, was able to self-propel w/c  -SB     Existing Precautions/Restrictions fall;oxygen therapy device and  L/min  2.5L O2  -SB     Barriers to Rehab cognitive status;medically complex;previous functional deficit;language barrier  -SB     Row Name 05/10/23 1430          Living Environment    People in Home facility resident  -SB     Row Name 05/10/23 1430          Cognition    Orientation Status (Cognition) unable/difficult to assess  -SB     Row Name 05/10/23 1430          Safety Issues, Functional Mobility    Safety Issues Affecting Function (Mobility) ability to follow commands;friction/shear risk;judgment;safety precaution awareness;safety precautions follow-through/compliance  -SB     Impairments Affecting Function (Mobility) balance;cognition;strength;endurance/activity tolerance;grasp;pain;muscle tone abnormal;postural/trunk control;range of motion (ROM)  -SB     Cognitive Impairments, Mobility Safety/Performance attention;awareness, need for assistance;impulsivity;insight into deficits/self-awareness;judgment;safety precaution awareness;safety precaution follow-through  -SB           User Key  (r) = Recorded By, (t) = Taken By, (c) = Cosigned By    Initials Name Provider Type    SB Kala Rodriguez PT DPT Physical Therapist               Mobility     Row Name 05/10/23 1430          Bed Mobility    Bed Mobility supine-sit;sit-supine;rolling left;rolling right;scooting/bridging  -SB     Rolling Left [place] (Bed Mobility) maximum assist (25% patient effort)  -SB     Rolling Right [place] (Bed Mobility) moderate assist (50% patient effort)  -SB     Scooting/Bridging [place] (Bed Mobility) dependent (less than 25% patient effort);2 person assist  -SB     Supine-Sit [place] (Bed Mobility) maximum assist (25% patient effort);2 person assist  -SB     Sit-Supine [place] (Bed Mobility) maximum assist (25% patient effort);2 person assist  -SB     Assistive Device (Bed Mobility) bed rails;draw sheet;head of bed elevated  -SB     Comment, (Bed Mobility) sat EOB for approx 5 min with min-mod A to  maintain static sitting balance  -SB     Row Name 05/10/23 1430          Transfers    Comment, (Transfers) not safe to attempt at this time  -SB           User Key  (r) = Recorded By, (t) = Taken By, (c) = Cosigned By    Initials Name Provider Type    Kala Erickson PT DPT Physical Therapist               Obj/Interventions     Row Name 05/10/23 1430          Range of Motion Comprehensive    General Range of Motion lower extremity range of motion deficits identified  -SB     Comment, General Range of Motion PROM WFL; unable to assess AROM due to cog status  -SB     Row Name 05/10/23 1430          Strength Comprehensive (MMT)    General Manual Muscle Testing (MMT) Assessment lower extremity strength deficits identified  -SB     Comment, General Manual Muscle Testing (MMT) Assessment observed 2-/5 on BLE  -SB     Row Name 05/10/23 1430          Balance    Balance Assessment sitting static balance;sitting dynamic balance  -SB     Static Sitting Balance minimal assist;moderate assist  -SB     Dynamic Sitting Balance moderate assist;2-person assist  -SB     Position, Sitting Balance sitting edge of bed;unsupported  -SB     Row Name 05/10/23 1430          Sensory Assessment (Somatosensory)    Sensory Assessment (Somatosensory) unable/difficult to assess  -SB           User Key  (r) = Recorded By, (t) = Taken By, (c) = Cosigned By    Initials Name Provider Type    Kala Erickson PT DPT Physical Therapist               Goals/Plan     Row Name 05/10/23 1430          Bed Mobility Goal 1 (PT)    Activity/Assistive Device (Bed Mobility Goal 1, PT) sit to supine;supine to sit;rolling to right;rolling to left  -SB     [place] Level/Cues Needed (Bed Mobility Goal 1, PT) moderate assist (50-74% patient effort)  -SB     Time Frame (Bed Mobility Goal 1, PT) long term goal (LTG)  -SB     Progress/Outcomes (Bed Mobility Goal 1, PT) new goal  -SB     Row Name 05/10/23 1430          Transfer Goal 1 (PT)    Activity/Assistive  Device (Transfer Goal 1, PT) bed-to-chair/chair-to-bed;wheelchair transfer  -SB     [place] Level/Cues Needed (Transfer Goal 1, PT) moderate assist (50-74% patient effort)  -SB     Time Frame (Transfer Goal 1, PT) long term goal (LTG)  -SB     Progress/Outcome (Transfer Goal 1, PT) new goal  -SB     Row Name 05/10/23 1430          Therapy Assessment/Plan (PT)    Planned Therapy Interventions (PT) balance training;bed mobility training;patient/family education;transfer training;postural re-education;strengthening;ROM (range of motion);stretching;wheelchair management/propulsion training  -SB           User Key  (r) = Recorded By, (t) = Taken By, (c) = Cosigned By    Initials Name Provider Type    Kala Erickson, PT DPT Physical Therapist               Clinical Impression     Row Name 05/10/23 1430          Pain    Pain Intervention(s) Medication (See MAR);Repositioned;Ambulation/increased activity  -SB     Additional Documentation Pain Scale: FACES Pre/Post-Treatment (Group)  -SB     Row Name 05/10/23 1430          Pain Scale: FACES Pre/Post-Treatment    Pain: FACES Scale, Pretreatment 0-->no hurt  -SB     Posttreatment Pain Rating 0-->no hurt  -SB     Row Name 05/10/23 1430          Plan of Care Review    Plan of Care Reviewed With patient  -SB     Progress no change  -SB     Outcome Evaluation PT eval completed. Pt nonverbal, produces sounds but not words, but able to nod head at times. OT called group home facility, per facility but was typically able to t/f with assist x1 to w/c, self propel w/c through facility. Today, pt reqd max-dep x2 for all bed mobility and sat EOB for approx 5 min with min-mod A. Unable to formally assess strength due to command following. Pt returned to supine due to fatigue and increasing assist needs. Pt is not at baseline per facility report and will benefit from skilled PT to improve bed mob, t/fs and w/c mobility. WIll monitor progress and participation closely. Recommend d/c  SNF.  -SB     Row Name 05/10/23 1430          Therapy Assessment/Plan (PT)    Patient/Family Therapy Goals Statement (PT) none stated  -SB     Rehab Potential (PT) fair, will monitor progress closely  -SB     Criteria for Skilled Interventions Met (PT) yes;meets criteria;skilled treatment is necessary  -SB     Therapy Frequency (PT) daily  -SB     Predicted Duration of Therapy Intervention (PT) until d/c or goals met  -SB     Row Name 05/10/23 1430          Vital Signs    Pre SpO2 (%) 96  -SB     O2 Delivery Pre Treatment room air  2.5L O2 via NC doffed upon arrival  -SB     O2 Delivery Intra Treatment room air  -SB     O2 Delivery Post Treatment nasal cannula  -SB     Row Name 05/10/23 1430          Positioning and Restraints    Pre-Treatment Position in bed  -SB     Post Treatment Position bed  -SB     In Bed notified nsg;fowlers;encouraged to call for assist;exit alarm on;side rails up x3;call light within reach;SCD pump applied  -SB           User Key  (r) = Recorded By, (t) = Taken By, (c) = Cosigned By    Initials Name Provider Type    Kala Erickson, PT DPT Physical Therapist               Outcome Measures     Row Name 05/10/23 1430 05/10/23 0800       How much help from another person do you currently need...    Turning from your back to your side while in flat bed without using bedrails? 1  -SB 1  -WM    Moving from lying on back to sitting on the side of a flat bed without bedrails? 1  -SB 1  -WM    Moving to and from a bed to a chair (including a wheelchair)? 1  -SB 1  -WM    Standing up from a chair using your arms (e.g., wheelchair, bedside chair)? 1  -SB 1  -WM    Climbing 3-5 steps with a railing? 1  -SB 1  -WM    To walk in hospital room? 1  -SB 1  -WM    AM-PAC 6 Clicks Score (PT) 6  -SB 6  -WM    Highest level of mobility 2 --> Bed activities/dependent transfer  -SB 2 --> Bed activities/dependent transfer  -WM    Row Name 05/10/23 1430 05/10/23 1423       Functional Assessment    Outcome Measure  Options AM-PAC 6 Clicks Basic Mobility (PT)  -JANIS AM-PAC 6 Clicks Daily Activity (OT)  -ЮЛИЯ          User Key  (r) = Recorded By, (t) = Taken By, (c) = Cosigned By    Initials Name Provider Type    Laurita De La O, RN Registered Nurse    Kala Erickson, PT DPT Physical Therapist    Jessenia Meraz, OTR/L, CSRS Occupational Therapist                             Physical Therapy Education     Title: PT OT SLP Therapies (In Progress)     Topic: Physical Therapy (In Progress)     Point: Mobility training (In Progress)     Learning Progress Summary           Patient Nonacceptance, E, NL by SB at 5/10/2023 1610    Comment: pt edu on POC, benefits of act and d/c plans                   Point: Home exercise program (Not Started)     Learner Progress:  Not documented in this visit.          Point: Body mechanics (Not Started)     Learner Progress:  Not documented in this visit.          Point: Precautions (In Progress)     Learning Progress Summary           Patient Nonacceptance, E, NL by SB at 5/10/2023 1610    Comment: pt edu on POC, benefits of act and d/c plans                               User Key     Initials Effective Dates Name Provider Type Discipline     06/16/21 -  Kala Rodriguez, PT DPT Physical Therapist PT              PT Recommendation and Plan  Planned Therapy Interventions (PT): balance training, bed mobility training, patient/family education, transfer training, postural re-education, strengthening, ROM (range of motion), stretching, wheelchair management/propulsion training  Plan of Care Reviewed With: patient  Progress: no change  Outcome Evaluation: PT eval completed. Pt nonverbal, produces sounds but not words, but able to nod head at times. OT called group home facility, per facility but was typically able to t/f with assist x1 to w/c, self propel w/c through facility. Today, pt reqd max-dep x2 for all bed mobility and sat EOB for approx 5 min with min-mod A. Unable to formally assess  strength due to command following. Pt returned to supine due to fatigue and increasing assist needs. Pt is not at baseline per facility report and will benefit from skilled PT to improve bed mob, t/fs and w/c mobility. WIll monitor progress and participation closely. Recommend d/c SNF.     Time Calculation:    PT Charges     Row Name 05/10/23 1611             Time Calculation    Start Time 1430  -SB      Stop Time 1510  -SB      Time Calculation (min) 40 min  -SB      PT Received On 05/10/23  -SB      PT Goal Re-Cert Due Date 05/20/23  -SB            User Key  (r) = Recorded By, (t) = Taken By, (c) = Cosigned By    Initials Name Provider Type    Kala Erickson, PT DPT Physical Therapist              Therapy Charges for Today     Code Description Service Date Service Provider Modifiers Qty    85059221093 HC PT EVAL MOD COMPLEXITY 3 5/10/2023 Kala Rodriguez PT DPT GP 1          PT G-Codes  Outcome Measure Options: AM-PAC 6 Clicks Basic Mobility (PT)  AM-PAC 6 Clicks Score (PT): 6  AM-PAC 6 Clicks Score (OT): 6  PT Discharge Summary  Anticipated Discharge Disposition (PT): skilled nursing facility    Kala Rodriguez PT DPT  5/10/2023

## 2023-05-10 NOTE — CASE MANAGEMENT/SOCIAL WORK
Continued Stay Note  [facility]     Patient Name: Jess Ramsey  MRN: [id number]  Today's Date: 5/10/2023    Admit Date: 5/5/2023    Plan: SNF Referrals   Discharge Plan     Row Name 05/10/23 1251       Plan    Plan Comments [facility] has declined a bed offer. Still awaiting therapy notes to send to the other facilities for review.    Row Name 05/10/23 0956       Plan    Plan Comments PT has been listed with the following facilities: [facility], [facility], [facility], and [facility]. Will fax PT/OT notes as soon as they become available.               Discharge Codes    No documentation.               Expected Discharge Date and Time     Expected Discharge Date Expected Discharge Time    May 10, 2023             LEE Smith

## 2023-05-10 NOTE — THERAPY EVALUATION
Patient Name: Jess Ramsey  : 1950    MRN: [id number]                              Today's Date: 5/10/2023       Admit Date: 2023    Visit Dx:     ICD-10-CM ICD-9-CM   1. Acute blood loss anemia  D62 285.1   2. Hypovolemic shock  R57.1 785.59   3. Oral phase dysphagia  R13.11 787.21   4. Acute gastric ulcer with hemorrhage  K25.0 531.00   5. Impaired mobility and ADLs  Z74.09 V49.89    Z78.9      Patient Active Problem List   Diagnosis   • Septic shock   • Right lower lobe pneumonia   • Intellectual disability   • Anemia   • Thrombocytopenia   • Transaminitis   • Hypoxia   • Metabolic encephalopathy   • Acute blood loss anemia   • Hypovolemic shock     Past Medical History:   Diagnosis Date   • Gastritis    • Hypertension    • Intellectual disability    • Psychosis    • Right hemiparesis    • Stroke      Past Surgical History:   Procedure Laterality Date   • ENDOSCOPY N/A 2023    Procedure: ESOPHAGOGASTRODUODENOSCOPY WITH ANESTHESIA;  Surgeon: Sherif Villegas MD;  Location: [facility] OR;  Service: Gastroenterology;  Laterality: N/A;  PRE GI BLEED  POST gastric ulcers,ink and clip   • ENDOSCOPY N/A 2023    Procedure: ESOPHAGOGASTRODUODENOSCOPY WITH ANESTHESIA;  Surgeon: Sherif Villegas MD;  Location: [facility] ENDOSCOPY;  Service: Gastroenterology;  Laterality: N/A;  Pre: Anemia  Post: Gastric ulcers  David Frazier MD   • HIP SURGERY Right       General Information     Row Name 05/10/23 1423          OT Time and Intention    Document Type evaluation  -JJ     Mode of Treatment occupational therapy  -JJ     Row Name 05/10/23 1423          General Information    Patient Profile Reviewed yes  -JJ     Prior Level of Function dependent:;ADL's;max assist:;transfer;bed mobility;min assist:;w/c or scooter  -JJ     Existing Precautions/Restrictions oxygen therapy device and L/min;fall  R sided paralysis, cognitive status, non-verbal.  -JJ     Barriers to Rehab medically complex;previous functional  deficit;physical barrier;cognitive status;language barrier  -J     Row Name 05/10/23 1423          Living Environment    People in Home facility resident  -J     Row Name 05/10/23 1423          Cognition    Orientation Status (Cognition) oriented to;person  -JJ     Row Name 05/10/23 1423          Safety Issues, Functional Mobility    Impairments Affecting Function (Mobility) balance;cognition;strength;endurance/activity tolerance;grasp;pain;muscle tone abnormal;postural/trunk control;range of motion (ROM)  -J     Cognitive Impairments, Mobility Safety/Performance attention;awareness, need for assistance;impulsivity;insight into deficits/self-awareness;judgment;safety precaution awareness;problem-solving/reasoning;safety precaution follow-through  -J           User Key  (r) = Recorded By, (t) = Taken By, (c) = Cosigned By    Initials Name Provider Type    Jessenia Meraz, COLT/L, CSRS Occupational Therapist                 Mobility/ADL's     Row Name 05/10/23 1423          Bed Mobility    Bed Mobility supine-sit;sit-supine;rolling right;rolling left;scooting/bridging  -JJ     Rolling Left [place] (Bed Mobility) maximum assist (25% patient effort)  -JJ     Rolling Right [place] (Bed Mobility) moderate assist (50% patient effort)  -JJ     Scooting/Bridging [place] (Bed Mobility) dependent (less than 25% patient effort);2 person assist  -JJ     Supine-Sit [place] (Bed Mobility) maximum assist (25% patient effort);2 person assist  -JJ     Sit-Supine [place] (Bed Mobility) maximum assist (25% patient effort);2 person assist  -JJ     Bed Mobility, Safety Issues decreased use of arms for pushing/pulling;decreased use of legs for bridging/pushing;impaired trunk control for bed mobility;cognitive deficits limit understanding  -JJ     Comment, (Bed Mobility) able to sit at EOB approx 5 minutes with Min- Mod A to maintain static sitting balance.  -     Row Name 05/10/23 1423           Transfers    Comment, (Transfers) not safe to attempt at this time.  -[facility] Name 05/10/23 1423          Functional Mobility    Functional Mobility- Comment not safe to attempt at this time.  -[facility] Name 05/10/23 1423          Activities of Daily Living    BADL Assessment/Intervention toileting;upper body dressing  -JJ     Row Name 05/10/23 1423          Toileting Assessment/Training    [place] Level (Toileting) adjust/manage clothing;change pad/brief;perform perineal hygiene;dependent (less than 25% patient effort)  -JJ     Position (Toileting) supine  -JJ     Comment, (Toileting) found incontinent of stool  -[facility] Name 05/10/23 1423          Upper Body Dressing Assessment/Training    [place] Level (Upper Body Dressing) don;doff;maximum assist (25% patient effort);dependent (less than 25% patient effort)  -JJ     Position (Upper Body Dressing) supine  -JJ     Comment, (Upper Body Dressing) hospital w  -           User Key  (r) = Recorded By, (t) = Taken By, (c) = Cosigned By    Initials Name Provider Type    Jessenia Meraz, OTR/L, CSRS Occupational Therapist               Obj/Interventions     [facility] Name 05/10/23 1505          Sensory Assessment (Somatosensory)    Sensory Assessment (Somatosensory) unable/difficult to assess  -JJ     Row Name 05/10/23 1505          Vision Assessment/Intervention    Visual Impairment/Limitations unable/difficult to assess  -JJ     Row Name 05/10/23 1505          Range of Motion Comprehensive    General Range of Motion upper extremity range of motion deficits identified  -     Comment, General Range of Motion Contractures of joints in multiple digits of B hands, impaired BUE AROM approx 50% d/t weakness.  -[facility] Name 05/10/23 1507          Strength Comprehensive (MMT)    Comment, General Manual Muscle Testing (MMT) Assessment BUE strength grossly 2-/5, difficult to complete formal assessment d/t cognitive status  -[facility] Name 05/10/23 1503           Balance    Balance Assessment sitting static balance;sitting dynamic balance  -JJ     Static Sitting Balance minimal assist;moderate assist  -JJ     Dynamic Sitting Balance moderate assist;2-person assist  -JJ     Position, Sitting Balance supported;sitting edge of bed  -JJ           User Key  (r) = Recorded By, (t) = Taken By, (c) = Cosigned By    Initials Name Provider Type    Jessenia Meraz OTR/L, CSRS Occupational Therapist               Goals/Plan     Row Name 05/10/23 1423          Grooming Goal 1 (OT)    Activity/Device (Grooming Goal 1, OT) wash face, hands  -JJ     [place] (Grooming Goal 1, OT) maximum assist (25-49% patient effort)  -JJ     Time Frame (Grooming Goal 1, OT) long term goal (LTG);by discharge  -JJ     Progress/Outcome (Grooming Goal 1, OT) new goal  -J     Row Name 05/10/23 1423          Self-Feeding Goal 1 (OT)    Activity/Device (Self-Feeding Goal 1, OT) self-feeding skills, all  AE PRN  -JJ     [place] Level/Cues Needed (Self-Feeding Goal 1, OT) minimum assist (75% or more patient effort)  -JJ     Time Frame (Self-Feeding Goal 1, OT) long term goal (LTG);by discharge  -JJ     Progress/Outcomes (Self-Feeding Goal 1, OT) new goal  -     Row Name 05/10/23 1423          Therapy Assessment/Plan (OT)    Planned Therapy Interventions (OT) activity tolerance training;adaptive equipment training;BADL retraining;functional balance retraining;transfer/mobility retraining;strengthening exercise;occupation/activity based interventions;patient/caregiver education/training;passive ROM/stretching;ROM/therapeutic exercise  -J           User Key  (r) = Recorded By, (t) = Taken By, (c) = Cosigned By    Initials Name Provider Type    Jessenia Meraz OTR/L, CSRS Occupational Therapist               Clinical Impression     Row Name 05/10/23 1509          Pain Assessment    Pain Intervention(s) Medication (See MAR);Repositioned;Ambulation/increased activity  -     Row Name  05/10/23 1505          Plan of Care Review    Plan of Care Reviewed With patient  -     Outcome Evaluation OT eval completed. Pt presents alert and oriented to self only. Therapist called group home facility, per facility but was typically able to t/f with assist x1 to w/c, self propel w/c through facility, is able to complete self feeding with use of AE but elsewise is dependent for all other adls. Today she demonstrates contractures in digits of B hands with hemiparesis noted in R UE. She produces sounds throughout session but is not able to produce words. She required Max Ax2 for all bed mobility. She was found incontinent of large stool and was Dep for all aspects of toileting. She was required Min-Mod A to maintain static sitting balance at EOB but only tolerate approx 5 minutes of activity. Returned to [facility] at end of session. Pt would benefit from skilled OT service but unsure of her ability to actively participate or retention of therapeutic skills/carryover. Will complete a short trial of therapy. Recommend d/c to SNF.  -     Row Name 05/10/23 1503          Therapy Assessment/Plan (OT)    Rehab Potential (OT) fair, will monitor progress closely  -     Criteria for Skilled Therapeutic Interventions Met (OT) yes;skilled treatment is necessary  -     Therapy Frequency (OT) 3 times/wk  -     Predicted Duration of Therapy Intervention (OT) 10 days  -     Row Name 05/10/23 1502          Therapy Plan Review/Discharge Plan (OT)    Anticipated Discharge Disposition (OT) skilled nursing facility  -     Row Name 05/10/23 1501          Positioning and Restraints    Pre-Treatment Position in bed  -     Post Treatment Position bed  -     In Bed notified nsg;fowlers;call light within reach;encouraged to call for assist;exit alarm on;side rails up x3  -           User Key  (r) = Recorded By, (t) = Taken By, (c) = Cosigned By    Initials Name Provider Type    Jessenia Meraz, COLT/RBIAN, CSRS  Occupational Therapist               Outcome Measures     Row Name 05/10/23 1423          How much help from another is currently needed...    Putting on and taking off regular lower body clothing? 1  -JJ     Bathing (including washing, rinsing, and drying) 1  -JJ     Toileting (which includes using toilet bed pan or urinal) 1  -JJ     Putting on and taking off regular upper body clothing 1  -JJ     Taking care of personal grooming (such as brushing teeth) 1  -JJ     Eating meals 1  -JJ     AM-PAC 6 Clicks Score (OT) 6  -JJ     Row Name 05/10/23 0800          How much help from another person do you currently need...    Turning from your back to your side while in flat bed without using bedrails? 1  -WM     Moving from lying on back to sitting on the side of a flat bed without bedrails? 1  -WM     Moving to and from a bed to a chair (including a wheelchair)? 1  -WM     Standing up from a chair using your arms (e.g., wheelchair, bedside chair)? 1  -WM     Climbing 3-5 steps with a railing? 1  -WM     To walk in hospital room? 1  -WM     AM-PAC 6 Clicks Score (PT) 6  -WM     Highest level of mobility 2 --> Bed activities/dependent transfer  -WM     Row Name 05/10/23 1430 05/10/23 1423       Functional Assessment    Outcome Measure Options AM-PAC 6 Clicks Basic Mobility (PT)  - AM-PAC 6 Clicks Daily Activity (OT)  -          User Key  (r) = Recorded By, (t) = Taken By, (c) = Cosigned By    Initials Name Provider Type     Laurita Chand, RN Registered Nurse    Kala Erickson, PT DPT Physical Therapist    JJessenia Fagan, OTR/L, CSRS Occupational Therapist                Occupational Therapy Education     Title: PT OT SLP Therapies (In Progress)     Topic: Occupational Therapy (In Progress)     Point: ADL training (In Progress)     Description:   Instruct learner(s) on proper safety adaptation and remediation techniques during self care or transfers.   Instruct in proper use of assistive devices.               Learning Progress Summary           Patient Acceptance, E, NR,NL by ЮЛИЯ at 5/10/2023 1528                   Point: Home exercise program (Not Started)     Description:   Instruct learner(s) on appropriate technique for monitoring, assisting and/or progressing therapeutic exercises/activities.              Learner Progress:  Not documented in this visit.          Point: Precautions (In Progress)     Description:   Instruct learner(s) on prescribed precautions during self-care and functional transfers.              Learning Progress Summary           Patient Acceptance, E, NR,NL by ЮЛИЯ at 5/10/2023 1528                   Point: Body mechanics (Not Started)     Description:   Instruct learner(s) on proper positioning and spine alignment during self-care, functional mobility activities and/or exercises.              Learner Progress:  Not documented in this visit.                      User Key     Initials Effective Dates Name Provider Type Discipline    ЮЛИЯ 11/10/21 -  Jessenia Alexandre, COLT/L, JOLENE Occupational Therapist OT              OT Recommendation and Plan  Planned Therapy Interventions (OT): activity tolerance training, adaptive equipment training, BADL retraining, functional balance retraining, transfer/mobility retraining, strengthening exercise, occupation/activity based interventions, patient/caregiver education/training, passive ROM/stretching, ROM/therapeutic exercise  Therapy Frequency (OT): 3 times/wk  Plan of Care Review  Plan of Care Reviewed With: patient  Outcome Evaluation: OT eval completed. Pt presents alert and oriented to self only. Therapist called group home facility, per facility but was typically able to t/f with assist x1 to w/c, self propel w/c through facility, is able to complete self feeding with use of AE but elsewise is dependent for all other adls. Today she demonstrates contractures in digits of B hands with hemiparesis noted in R UE. She produces sounds throughout session but  is not able to produce words. She required Max Ax2 for all bed mobility. She was found incontinent of large stool and was Dep for all aspects of toileting. She was required Min-Mod A to maintain static sitting balance at EOB but only tolerate approx 5 minutes of activity. Returned to [facility] at end of session. Pt would benefit from skilled OT service but unsure of her ability to actively participate or retention of therapeutic skills/carryover. Will complete a short trial of therapy. Recommend d/c to SNF.     Time Calculation:    Time Calculation- OT     Row Name 05/10/23 1423             Time Calculation- OT    OT Start Time 1423  add 10 minutes for chart review  -      OT Stop Time 1510  -      OT Time Calculation (min) 47 min  -      OT Received On 05/10/23  -      OT Goal Re-Cert Due Date 05/20/23  -            User Key  (r) = Recorded By, (t) = Taken By, (c) = Cosigned By    Initials Name Provider Type    Jessenia Meraz OTR/L, CSRS Occupational Therapist              Therapy Charges for Today     Code Description Service Date Service Provider Modifiers Qty    [id number]  OT EVAL MOD COMPLEXITY 4 5/10/2023 Jessenia Alexandre OTR/L, CSRS GO 1               Jessenia Alexandre, OTR/L, JOLENE  5/10/2023

## 2023-05-10 NOTE — PLAN OF CARE
Goal Outcome Evaluation:  Plan of Care Reviewed With: patient        Progress: no change  Outcome Evaluation: PT eval completed. Pt nonverbal, produces sounds but not words, but able to nod head at times. OT called group home facility, per facility but was typically able to t/f with assist x1 to w/c, self propel w/c through facility. Today, pt reqd max-dep x2 for all bed mobility and sat EOB for approx 5 min with min-mod A. Unable to formally assess strength due to command following. Pt returned to supine due to fatigue and increasing assist needs. Pt is not at baseline per facility report and will benefit from skilled PT to improve bed mob, t/fs and w/c mobility. WIll monitor progress and participation closely. Recommend d/c SNF.

## 2023-05-11 LAB
ANION GAP SERPL CALCULATED.3IONS-SCNC: 5 MMOL/L (ref 5–15)
BUN SERPL-MCNC: 7 MG/DL (ref 8–23)
BUN/CREAT SERPL: 14.9 (ref 7–25)
CALCIUM SPEC-SCNC: 8.4 MG/DL (ref 8.6–10.5)
CHLORIDE SERPL-SCNC: 110 MMOL/L (ref 98–107)
CO2 SERPL-SCNC: 29 MMOL/L (ref 22–29)
CREAT SERPL-MCNC: 0.47 MG/DL (ref 0.57–1)
DEPRECATED RDW RBC AUTO: 50.8 FL (ref 37–54)
EGFRCR SERPLBLD CKD-EPI 2021: 100.7 ML/MIN/1.73
ERYTHROCYTE [DISTWIDTH] IN BLOOD BY AUTOMATED COUNT: 15.8 % (ref 12.3–15.4)
GLUCOSE SERPL-MCNC: 100 MG/DL (ref 65–99)
HCT VFR BLD AUTO: 25.6 % (ref 34–46.6)
HGB BLD-MCNC: 8.3 G/DL (ref 12–15.9)
MCH RBC QN AUTO: 28.9 PG (ref 26.6–33)
MCHC RBC AUTO-ENTMCNC: 32.4 G/DL (ref 31.5–35.7)
MCV RBC AUTO: 89.2 FL (ref 79–97)
PLATELET # BLD AUTO: 83 10*3/MM3 (ref 140–450)
PMV BLD AUTO: 12.8 FL (ref 6–12)
POTASSIUM SERPL-SCNC: 3.8 MMOL/L (ref 3.5–5.2)
RBC # BLD AUTO: 2.87 10*6/MM3 (ref 3.77–5.28)
SODIUM SERPL-SCNC: 144 MMOL/L (ref 136–145)
WBC NRBC COR # BLD: 9.44 10*3/MM3 (ref 3.4–10.8)

## 2023-05-11 PROCEDURE — 97110 THERAPEUTIC EXERCISES: CPT

## 2023-05-11 PROCEDURE — 97535 SELF CARE MNGMENT TRAINING: CPT

## 2023-05-11 PROCEDURE — 85027 COMPLETE CBC AUTOMATED: CPT | Performed by: FAMILY MEDICINE

## 2023-05-11 PROCEDURE — 92526 ORAL FUNCTION THERAPY: CPT

## 2023-05-11 PROCEDURE — 80048 BASIC METABOLIC PNL TOTAL CA: CPT | Performed by: FAMILY MEDICINE

## 2023-05-11 RX ADMIN — COLLAGENASE SANTYL 1 APPLICATION: 250 OINTMENT TOPICAL at 22:57

## 2023-05-11 RX ADMIN — COLLAGENASE SANTYL 1 APPLICATION: 250 OINTMENT TOPICAL at 10:19

## 2023-05-11 RX ADMIN — PANTOPRAZOLE SODIUM 8 MG/HR: 40 INJECTION, POWDER, FOR SOLUTION INTRAVENOUS at 19:39

## 2023-05-11 RX ADMIN — Medication 10 ML: at 22:57

## 2023-05-11 RX ADMIN — COLLAGENASE SANTYL 1 APPLICATION: 250 OINTMENT TOPICAL at 18:17

## 2023-05-11 RX ADMIN — PANTOPRAZOLE SODIUM 8 MG/HR: 40 INJECTION, POWDER, FOR SOLUTION INTRAVENOUS at 10:19

## 2023-05-11 RX ADMIN — PANTOPRAZOLE SODIUM 8 MG/HR: 40 INJECTION, POWDER, FOR SOLUTION INTRAVENOUS at 05:15

## 2023-05-11 RX ADMIN — PANTOPRAZOLE SODIUM 8 MG/HR: 40 INJECTION, POWDER, FOR SOLUTION INTRAVENOUS at 00:13

## 2023-05-11 NOTE — PLAN OF CARE
"Goal Outcome Evaluation:  Plan of Care Reviewed With: patient           Outcome Evaluation: Pt in bed yelling out but unable to understand what it is she wants. After a few mins therapist figured out pt is saying, \"I wanna go to group home.\" Dep x2 to scoot pt up in bed and position. Performed AAROM-PROM BLE x15. Pt only assited w/ 2-3 exercies and the rest was passive. Postioned pt onto her L side. Will cont POC.         "

## 2023-05-11 NOTE — PLAN OF CARE
Goal Outcome Evaluation:  Plan of Care Reviewed With: (P) patient        Progress: (P) no change  Outcome Evaluation: (P) OT tx completed. Pt in fowlers in bed and on 2 ½L/NC. Pt non verbal and communicates with head nods. Pt tolerated BUE PROM exercises of shoulder flex/ext, elbow flex/ext, wrist flex/ext, and digit flex/ext 1 set x 10 reps to increase BUE strength for ADL tasks. Pt presents with flaccid digits and with contractures. Pt verbalized pain with passive digit flex/ext. Pt was resistive during exercises as well. Pt completed grooming tasks in bed of washing face requiring Dep A d/t BUE flaccidity and weakness, and donning chapstick and deodorant requiring Dep A d/t BUE weakness. Pt unable to follow commands well and requires Mod verbal/tactile cues to stay on task. Pt completed LB dressing of donning socks requiring Dep A d/t weakness and flaccidity in BUE. SNF placement recommended upon discharge. OT POC to continue.

## 2023-05-11 NOTE — CASE MANAGEMENT/SOCIAL WORK
Continued Stay Note  [facility]     Patient Name: Jess Ramsey  MRN: [id number]  Today's Date: 5/11/2023    Admit Date: 5/5/2023    Plan: SNF   Discharge Plan     Row Name 05/11/23 1506       Plan    Plan Comments SW has left a message for Venita (guardian) at [phone] and left message to discuss dc plan.    Row Name 05/11/23 2398       Plan    Plan SNF    Plan Comments SW [facility].               Discharge Codes    No documentation.               Expected Discharge Date and Time     Expected Discharge Date Expected Discharge Time    May 11, 2023             JACKSON HairstonW

## 2023-05-11 NOTE — PLAN OF CARE
Goal Outcome Evaluation:  Plan of Care Reviewed With: (P) patient        Progress: (P) no change  Outcome Evaluation: (P) OT tx completed. Pt in fowlers in bed and on 2 ½L/NC. Pt non verbal and communicates with head nods. Pt tolerated BUE PROM exercises of shoulder flex/ext, elbow flex/ext, wrist flex/ext, and digit flex/ext 1 set x 10 reps to increase BUE strength for ADL tasks. Pt presents with flaccid digits and with contractures. Pt verbalized pain with passive digit flex/ext. Pt was resistive during exercises as well. Pt completed grooming tasks in bed of washing face requiring Dep A d/t BUE flaccidity and weakness, and donning chapstick and deodorant requiring Dep A d/t BUE weakness. Pt unable to follow commands well and requires Mod verbal/tactile cues to stay on task. Pt completed LB dressing of donning socks requiring Dep A d/t weakness and flaccidity in BUE. OT POC to continue.

## 2023-05-11 NOTE — PLAN OF CARE
"  Problem: Adult Inpatient Plan of Care  Goal: Plan of Care Review  Outcome: Ongoing, Progressing  Flowsheets (Taken 5/11/2023 1005)  Progress: improving  Plan of Care Reviewed With: (OT student, Juan Pablo, and RN, Lisbeth)   patient   other (see comments)  Outcome Evaluation:  SLP dysphagia tx completed this AM, completed as co-tx with OT student. Pt with L lean, required frequent repositioning. She c/o pain, verbalizing \"hurt\" at times with other verbalizations which were often unintelligible. She nodded and shook head to confirm and occasionally oppose SLP offerings of PO trials for observation of PO diet toleration. She consumed a 1x pureed trial with mildly reduced, yet functional labial seal on spoon, appearingly functional oral transit with this consistency. She then consumed multiple trials of thin liquids via straw, mild delay in bolus extraction time, felt secondary to generalized labial weakness. Minimally audible swallow, occasional mild oral hold prior to bolus transit. No overt s/s of aspiration were observed. Educated RNLisbeth, and OT student on SLP assessment of toleration. Given performance and belief that current diet is most appropriate baseline diet given cognitive impairment and chronic oral motor weakness, continued SLP services are not warranted at this time. Cannot fully r/o aspiration.   RECS:    Continue pureed diet consistency with regular/thin liquid consistency   1:1 feeds with staff   Reposition to upright consistency as needed throughout meals due to frequent instances of poor positioning   RN to monitor for increased lung congestion   Meds crushed (if safe to be crushed) in pudding/applesauce. MD to re-consult if changes or new concerns arise, as SLP is signing off.        "

## 2023-05-11 NOTE — DISCHARGE PLACEMENT REQUEST
"From:  Michelle Carney, BSW  [phone]    Jess Sterling (72 y.o. Female)     Date of Birth   1950    Social Security Number       Address   [address] [facility] [address]    Home Phone   [phone]    MRN   [id number]       Congregation   Other    Marital Status   Single                            Admission Date   5/5/23    Admission Type   Urgent    Admitting Provider   Gallo Hazel MD    Attending Provider   Gallo Hazel MD    Department, Room/Bed   [facility] 4B, 449/1       Discharge Date       Discharge Disposition       Discharge Destination                               Attending Provider: Gallo Hazel MD    Allergies: No Known Allergies    Isolation: None   Infection: None   Code Status: CPR    Ht: 154.9 cm (61\")   Wt: 59.6 kg (131 lb 6.4 oz)    Admission Cmt: None   Principal Problem: Septic shock [A41.9,R65.21]                 Active Insurance as of 5/5/2023     Primary Coverage     Payor Plan Insurance Group Employer/Plan Group    MEDICARE MEDICARE A & B      Payor Plan Address Payor Plan Phone Number Payor Plan Fax Number Effective Dates    [address] [phone]  9/1/1982 - None Entered    [facility] [id number]       Subscriber Name Subscriber Birth Date Member ID       JESS STERLING 1950 [id number]           Secondary Coverage     Payor Plan Insurance Group Employer/Plan Group    [place]      Payor Plan Address Payor Plan Phone Number Payor Plan Fax Number Effective Dates    [address] [address] [phone]  5/5/2023 - None Entered    [facility] [id number]       Subscriber Name Subscriber Birth Date Member ID       JESS STERLING 1950 [id number]                 Emergency Contacts      (Rel.) Home Phone Work Phone Mobile Phone    meaghan mercer (Legal Guardian) -- -- [phone]               History & Physical      Sherif Villegas MD at 05/09/23 1159          H&P reviewed. The patient was examined and there are no " changes to the H&P.          Electronically signed by Sherif Villegas MD at 05/09/23 1159   Source Note                [facility] Gastroenterology  Inpatient Progress Note  Today's date:  05/08/23    Jess Ramsey  1950       Reason for Follow Up:    1.  Upper GI bleed  2.  Gastric ulcer    Subjective:   Patient much more docile today.  Minimal dark stool overnight.    No Known Allergies    Current Facility-Administered Medications:   •  acetaminophen (TYLENOL) tablet 650 mg, 650 mg, Oral, Q4H PRN **OR** acetaminophen (TYLENOL) 160 MG/5ML solution 650 mg, 650 mg, Oral, Q4H PRN **OR** acetaminophen (TYLENOL) suppository 650 mg, 650 mg, Rectal, Q4H PRN, Gallo Hazel MD  •  Chlorhexidine Gluconate Cloth 2 % pads 1 application, 1 application, Topical, Q24H, Gallo Hazel MD, 1 application at 05/08/23 0349  •  collagenase ointment 1 application, 1 application, Topical, Q12H, Estrella Valdez APRN  •  dextrose 5 % with KCl 20 mEq/L infusion, 75 mL/hr, Intravenous, Continuous, Gallo Hazel MD, Last Rate: 75 mL/hr at 05/08/23 1418, 75 mL/hr at 05/08/23 1418  •  ipratropium-albuterol (DUO-NEB) nebulizer solution 3 mL, 3 mL, Nebulization, Q6H PRN, Gallo Hazel MD  •  Morphine sulfate (PF) injection 1 mg, 1 mg, Intravenous, Q4H PRN, Gallo Hazel MD, 1 mg at 05/07/23 0016  •  mupirocin (BACTROBAN) 2 % nasal ointment 1 application, 1 application, Each Nare, BID, Gallo Hazel MD, 1 application at 05/08/23 0916  •  ondansetron (ZOFRAN) injection 4 mg, 4 mg, Intravenous, Q6H PRN, Gallo Hazel MD  •  pantoprazole (PROTONIX) 40 mg in 100mL NS IVPB, 8 mg/hr, Intravenous, Continuous, Gallo Hazel MD, Last Rate: 20 mL/hr at 05/08/23 1227, 8 mg/hr at 05/08/23 1227  •  Pharmacy to Dose Zosyn, , Does not apply, Continuous PRN, Gallo Hazel MD  •  piperacillin-tazobactam (ZOSYN) 4.5 g in iso-osmotic dextrose 100 mL IVPB (premix), 4.5 g, Intravenous, Q8H, Gallo Hazel MD, 4.5 g at 05/08/23 1230  •   potassium chloride (MICRO-K) CR capsule 40 mEq, 40 mEq, Oral, Once, Gallo Hazel MD  •  sodium chloride 0.9 % flush 10 mL, 10 mL, Intravenous, Q12H, Gallo Hazel MD, 10 mL at 05/08/23 0916  •  sodium chloride 0.9 % flush 10 mL, 10 mL, Intravenous, PRN, Gallo Hazel MD  •  sodium chloride 0.9 % infusion 40 mL, 40 mL, Intravenous, PRN, Gallo Hazel MD  •  ziprasidone (GEODON) injection 10 mg, 10 mg, Intramuscular, Q12H PRN, Gallo Hazel MD, 10 mg at 05/07/23 0258    Review of Systems:   Review of Systems   All other systems reviewed and are negative.  Unable to obtain due to mental status; patient minimally verbal; difficult to communicate  Vital Signs:  Temp:  [96.9 °F (36.1 °C)-97.7 °F (36.5 °C)] 97.6 °F (36.4 °C)  Heart Rate:  [75-84] 79  Resp:  [17-22] 18  BP: ()/() 135/72  Body mass index is 24.83 kg/m².     Intake/Output Summary (Last 24 hours) at 5/8/2023 1830  Last data filed at 5/8/2023 1200  Gross per 24 hour   Intake 1531.6 ml   Output 655 ml   Net 876.6 ml     I/O this shift:  In: 600.6 [I.V.:485.6; IV Piggyback:115]  Out: 300 [Urine:300]  Physical Exam:  Physical Exam  CV-RRR  Lung-good expansion bilaterally  ABD-benign      Results Review:   I have reviewed all of the patient's current test results    Results from last 7 days   Lab Units 05/08/23  0401 05/07/23  1547 05/07/23  1156 05/07/23  0537 05/06/23  0834 05/06/23  0400   WBC 10*3/mm3 7.40  --   --  8.56  --  8.66   HEMOGLOBIN g/dL 9.5* 9.7* 9.7* 8.2*   < > 10.0*   HEMATOCRIT % 28.4* 28.7* 28.0* 23.4*   < > 30.4*   PLATELETS 10*3/mm3 77*  --   --  84*  --  59*    < > = values in this interval not displayed.       Results from last 7 days   Lab Units 05/08/23  0455 05/07/23  0537 05/06/23  0400 05/05/23  2000   SODIUM mmol/L 150* 153* 147* 144   POTASSIUM mmol/L 3.3* 3.1* 3.6 3.9   CHLORIDE mmol/L 117* 123* 120* 114*   CO2 mmol/L 26.0 26.0 19.0* 25.0   BUN mg/dL 18 31* 52* 56*   CREATININE mg/dL 0.45* 0.51* 0.62 0.83    CALCIUM mg/dL 8.5* 8.3* 7.5* 7.8*   BILIRUBIN mg/dL 0.6 0.7  --  0.3   ALK PHOS U/L 60 51  --  53   ALT (SGPT) U/L 48* 42*  --  54*   AST (SGOT) U/L 40* 39*  --  39*   GLUCOSE mg/dL 87 78 156* 145*       Results from last 7 days   Lab Units 05/07/23  1156 05/06/23  0839   INR  1.09 1.38*       Lab Results   Lab Value Date/Time    LIPASE 25 08/14/2020 0712    LIPASE 16 07/21/2019 2134       Radiology Review:  Imaging Results (Last 24 Hours)     ** No results found for the last 24 hours. **          Impression/Plan:  Patient Active Problem List   Diagnosis Code   • Septic shock A41.9, R65.21   • Right lower lobe pneumonia J18.9   • Intellectual disability F79   • Anemia D64.9   • Thrombocytopenia D69.6   • Transaminitis R74.01   • Hypoxia R09.02   • Metabolic encephalopathy G93.41   • Acute blood loss anemia D62   • Hypovolemic shock R57.1     UGI bleed/secondary to gastric ulcer.  Status post epinephrine/Hemoclip with moderate success.  No further bleeding with stable Hgb.       Vascular surgery evaluation appreciated     -Continue aggressive supportive care  -PPI  -Advance to full liquids  -Relook EGD tomorrow  -Follow-up H. pylori antibody    Sherif Villegas MD  05/08/23  18:30 CDT    Electronically signed by Sherif Villegas MD at 05/08/23 1834             Sherif Villegas MD at 05/08/23 1830                [facility] Gastroenterology  Inpatient Progress Note  Today's date:  05/08/23    Jess Ramsey  1950       Reason for Follow Up:    1.  Upper GI bleed  2.  Gastric ulcer    Subjective:   Patient much more docile today.  Minimal dark stool overnight.    No Known Allergies    Current Facility-Administered Medications:   •  acetaminophen (TYLENOL) tablet 650 mg, 650 mg, Oral, Q4H PRN **OR** acetaminophen (TYLENOL) 160 MG/5ML solution 650 mg, 650 mg, Oral, Q4H PRN **OR** acetaminophen (TYLENOL) suppository 650 mg, 650 mg, Rectal, Q4H PRN, Hazel, Gallo C, MD  •  Chlorhexidine Gluconate Cloth 2 % pads 1  application, 1 application, Topical, Q24H, Gallo Hazel MD, 1 application at 05/08/23 0349  •  collagenase ointment 1 application, 1 application, Topical, Q12H, Estrella Valdez APRN  •  dextrose 5 % with KCl 20 mEq/L infusion, 75 mL/hr, Intravenous, Continuous, Gallo Hazel MD, Last Rate: 75 mL/hr at 05/08/23 1418, 75 mL/hr at 05/08/23 1418  •  ipratropium-albuterol (DUO-NEB) nebulizer solution 3 mL, 3 mL, Nebulization, Q6H PRN, Gallo Hazel MD  •  Morphine sulfate (PF) injection 1 mg, 1 mg, Intravenous, Q4H PRN, Gallo Hazel MD, 1 mg at 05/07/23 0016  •  mupirocin (BACTROBAN) 2 % nasal ointment 1 application, 1 application, Each Nare, BID, Gallo Hazel MD, 1 application at 05/08/23 0916  •  ondansetron (ZOFRAN) injection 4 mg, 4 mg, Intravenous, Q6H PRN, Gallo Hazel MD  •  pantoprazole (PROTONIX) 40 mg in 100mL NS IVPB, 8 mg/hr, Intravenous, Continuous, Gallo Hazel MD, Last Rate: 20 mL/hr at 05/08/23 1227, 8 mg/hr at 05/08/23 1227  •  Pharmacy to Dose Zosyn, , Does not apply, Continuous PRN, Gallo Hazel MD  •  piperacillin-tazobactam (ZOSYN) 4.5 g in iso-osmotic dextrose 100 mL IVPB (premix), 4.5 g, Intravenous, Q8H, Gallo Hazel MD, 4.5 g at 05/08/23 1230  •  potassium chloride (MICRO-K) CR capsule 40 mEq, 40 mEq, Oral, Once, Gallo Hazel MD  •  sodium chloride 0.9 % flush 10 mL, 10 mL, Intravenous, Q12H, Gallo Hazel MD, 10 mL at 05/08/23 0916  •  sodium chloride 0.9 % flush 10 mL, 10 mL, Intravenous, PRN, Gallo Hazel MD  •  sodium chloride 0.9 % infusion 40 mL, 40 mL, Intravenous, PRN, Gallo Hazel MD  •  ziprasidone (GEODON) injection 10 mg, 10 mg, Intramuscular, Q12H PRN, Gallo Hazel MD, 10 mg at 05/07/23 0258    Review of Systems:   Review of Systems   All other systems reviewed and are negative.  Unable to obtain due to mental status; patient minimally verbal; difficult to communicate  Vital Signs:  Temp:  [96.9 °F (36.1 °C)-97.7 °F (36.5 °C)] 97.6 °F (36.4  °C)  Heart Rate:  [75-84] 79  Resp:  [17-22] 18  BP: ()/() 135/72  Body mass index is 24.83 kg/m².     Intake/Output Summary (Last 24 hours) at 5/8/2023 1830  Last data filed at 5/8/2023 1200  Gross per 24 hour   Intake 1531.6 ml   Output 655 ml   Net 876.6 ml     I/O this shift:  In: 600.6 [I.V.:485.6; IV Piggyback:115]  Out: 300 [Urine:300]  Physical Exam:  Physical Exam  CV-RRR  Lung-good expansion bilaterally  ABD-benign      Results Review:   I have reviewed all of the patient's current test results    Results from last 7 days   Lab Units 05/08/23  0401 05/07/23  1547 05/07/23  1156 05/07/23  0537 05/06/23  0834 05/06/23  0400   WBC 10*3/mm3 7.40  --   --  8.56  --  8.66   HEMOGLOBIN g/dL 9.5* 9.7* 9.7* 8.2*   < > 10.0*   HEMATOCRIT % 28.4* 28.7* 28.0* 23.4*   < > 30.4*   PLATELETS 10*3/mm3 77*  --   --  84*  --  59*    < > = values in this interval not displayed.       Results from last 7 days   Lab Units 05/08/23  0455 05/07/23  0537 05/06/23  0400 05/05/23  2000   SODIUM mmol/L 150* 153* 147* 144   POTASSIUM mmol/L 3.3* 3.1* 3.6 3.9   CHLORIDE mmol/L 117* 123* 120* 114*   CO2 mmol/L 26.0 26.0 19.0* 25.0   BUN mg/dL 18 31* 52* 56*   CREATININE mg/dL 0.45* 0.51* 0.62 0.83   CALCIUM mg/dL 8.5* 8.3* 7.5* 7.8*   BILIRUBIN mg/dL 0.6 0.7  --  0.3   ALK PHOS U/L 60 51  --  53   ALT (SGPT) U/L 48* 42*  --  54*   AST (SGOT) U/L 40* 39*  --  39*   GLUCOSE mg/dL 87 78 156* 145*       Results from last 7 days   Lab Units 05/07/23  1156 05/06/23  0839   INR  1.09 1.38*       Lab Results   Lab Value Date/Time    LIPASE 25 08/14/2020 0712    LIPASE 16 07/21/2019 2134       Radiology Review:  Imaging Results (Last 24 Hours)     ** No results found for the last 24 hours. **          Impression/Plan:  Patient Active Problem List   Diagnosis Code   • Septic shock A41.9, R65.21   • Right lower lobe pneumonia J18.9   • Intellectual disability F79   • Anemia D64.9   • Thrombocytopenia D69.6   • Transaminitis R74.01   •  Hypoxia R09.02   • Metabolic encephalopathy G93.41   • Acute blood loss anemia D62   • Hypovolemic shock R57.1     UGI bleed/secondary to gastric ulcer.  Status post epinephrine/Hemoclip with moderate success.  No further bleeding with stable Hgb.       Vascular surgery evaluation appreciated     -Continue aggressive supportive care  -PPI  -Advance to full liquids  -Relook EGD tomorrow  -Follow-up H. pylori antibody    Sherif Villegas MD  05/08/23  18:30 CDT    Electronically signed by Sherif Villegas MD at 05/08/23 1834     David Laird DO at 05/05/23 1806              [facility] Medicine Services  HISTORY AND PHYSICAL    Date of Admission: 5/5/2023  Primary Care Physician: David Frazier MD    Subjective   Primary Historian: Chart.    Chief Complaint: Transferred for hypotension.    History of Present Illness  This is a 72-year-old -American female patient who lives at a group home in [place], [place].  She is a snow of the [facility] in [place].  She has had a prior stroke and has a cognitive deficit according to the transferring physician.  Her baseline is unclear to me.  She arrives unable to provide any history.  She is fairly somnolent and does not speak.    She was admitted at the transferring facility on 5/3.  She comes from [facility] in [facility].  Her primary care doctor, Dr. Frazier, had been caring for her.  He states that when he admitted her she was hypothermic and had altered mental status.  He searched for signs of infection and found none.  He felt that she was dehydrated.  She was warmed and placed on fluids.  She has not improved very much.  She started requiring some oxygen earlier today after she had not been.  He repeated a chest x-ray and saw a right lower lobe pneumonia.  She had also been having blood pressures in the 80s so he placed a central line and started her on norepinephrine.  They do not have an intensive care unit so he  requested that she come here.    Review of Systems   Unable to perform ROS: Mental status change      Past Medical History:   Past Medical History:   Diagnosis Date   • Hypertension    • Intellectual disability    • Psychosis    • Right hemiparesis    • Stroke       Past Surgical History:  Past Surgical History:   Procedure Laterality Date   • HIP SURGERY Right 2019     Social History: Alcohol use questions deferred to the physician. Drug use questions deferred to the physician.    Family History: Family history is unknown by patient.       Allergies:  No Known Allergies    Medications:  It appears that her outpatient medications were acetaminophen, amlodipine, aspirin, atorvastatin, carvedilol, loratadine, Risperdal, Dyazide, calcium and vitamin D, PreserVision, and Senokot.    In the hospital she had been receiving albuterol, pantoprazole, Lovenox, acetaminophen, and vancomycin/Zosyn.    I have utilized all available immediate resources to obtain, update, or review the patient's current medications (including all prescriptions, over-the-counter products, herbals, cannabis/cannabidiol products, and vitamin/mineral/dietary (nutritional) supplements).    Objective     Vital Signs: BP 99/68   Pulse 88   Temp 98.5 °F (36.9 °C) (Axillary)   Resp 12   Wt 57 kg (125 lb 10.6 oz)   SpO2 93%   Physical Exam  Constitutional:       Comments: In bed.   HENT:      Head: Normocephalic and atraumatic.      Mouth/Throat:      Mouth: Mucous membranes are dry.   Eyes:      Conjunctiva/sclera: Conjunctivae normal.      Pupils: Pupils are equal, round, and reactive to light.   Neck:      Vascular: No JVD.      Comments: Right IJ CVC placed at the transferring facility.  We will get a chest x-ray to confirm placement.  Cardiovascular:      Rate and Rhythm: Normal rate and regular rhythm.      Heart sounds: Normal heart sounds.   Pulmonary:      Breath sounds: Rhonchi and rales present. No wheezing.      Comments: On 3 L of oxygen.   Slightly tachypneic at times.  Chest:      Chest wall: No tenderness.   Abdominal:      General: Bowel sounds are normal. There is no distension.      Palpations: Abdomen is soft.      Tenderness: There is no abdominal tenderness.   Musculoskeletal:         General: No tenderness or deformity. Normal range of motion.      Cervical back: Neck supple.   Skin:     General: Skin is warm and dry.      Findings: No rash.   Neurological:      Mental Status: She is disoriented.      Motor: Weakness present. No abnormal muscle tone.      Comments: Seems to be globally weak at present, but also record stated she has more chronic right hemiparesis after her stroke.  She responded to painful stimuli in the lower extremities, but not well in the upper extremities.   Psychiatric:      Comments: Fairly somnolent.        Results Reviewed from [facility]:  1.  ABG on 5/3 showed a pH of 7.316, PCO2 65, PaO2 555.  Sat of 100% on room air.  2.  Chemistries from 5/5 show a sodium of 147, potassium 4.1.  Chloride 115 and bicarb 29.  BUN and creatinine 51 and 0.96 respectively.  Calcium 7.9 and glucose 126.  Hepatic function panel shows an AST of 54, ALT 85.  Alkaline phosphatase 62 with a total bilirubin of 0.27.  Total protein 4.0 and albumin 1.4.  3.  Magnesium on 5/3 was 2.1.  4.  BNP slightly elevated today at 322.  5.  High-sensitivity troponin on 5/3 was normal at 15 and was repeated today at 52.  Upper limit of normal is 51.  6.  CBC from today shows white blood cell count of 4.8.  H&H 8.3 and 23.9 respectively.  Platelets 86,000.  7.  COVID-19 testing negative.  8.  Urinalysis showed 2+ bacteria with no pyuria and negative nitrites with trace leukocyte esterase.  9.  EKG from today at 1117 showed sinus rhythm without ST or T wave changes suggestive of ischemia.  10.  She had a recent CT of the head without contrast and a chest x-ray.  The transferring physician stated that her initial chest x-ray failed to show any  problems.  He told me on the transfer line that the chest x-ray from today showed a developing right lower lobe pneumonia.  Recent CT of the head was apparently unremarkable.  No reads were sent.      I have personally reviewed and interpreted the radiology studies and ECG obtained at time of admission.     Assessment / Plan   Assessment:   Active Hospital Problems    Diagnosis    • **Septic shock    • Right lower lobe pneumonia    • Hypoxia    • Metabolic encephalopathy    • Anemia    • Thrombocytopenia    • Intellectual disability    • Transaminitis      Treatment Plan  The patient will be admitted to my service here at [facility].  She is transferred here from [facility] in [facility].  Her primary care provider had her in the hospital there for the last 2 days.  She presented with hypothermia and altered mental status.  He thought at first she was just dehydrated and then she ultimately developed a right lower lobe pneumonia and some hypoxemia after initially having clear chest imaging and a normal urinalysis.  She became hypotensive.  A central line was placed and she was started on norepinephrine and sent here.    Check lactate and procalcitonin.  MRSA nasal screen.  Streptococcal/Legionella urinary antigens. Highly unlikely to produce sputum.    Dose vancomycin and Zosyn.    Make efforts at pulmonary toilet.  DuoNeb.    Bolus fluids and check CVP.  Try to wean off norepinephrine.    Speech therapy to evaluate her if she becomes more awake.    She may be chronically thrombocytopenic and anemic.  Unclear if she is.  Also has a transaminitis.  These issues may need to be looked into further soon.    SCDs for DVT prophylaxis.    Medical Decision Making  Number and Complexity of problems: 1 acute, highly complex problem in the form of her septic shock caused by a presumed right lower lobe pneumonia that was checked out by the transferring provider.  Differential Diagnosis:  CHF.    Conditions and Status        Condition is worsening.     [facility] Data  External documents reviewed: Reviewed records from [facility].   Cardiac tracing (EKG, telemetry) interpretation: NSR.   Radiology interpretation: Was told by the transferring facility that she had a right lower lobe pneumonia.  I was told the CT of the head was unremarkable.  Labs reviewed: As above.  Any tests that were considered but not ordered: CT of the chest.     Decision rules/scores evaluated (example [id number], Wells, etc): None considered at present.     Discussed with: The transferring physician as above.  Discussed with her nurse (Chantel) here.     Care Planning  Shared decision making: Unable to consent to anything.  Her transfer was approved by her state guardian.  Code status and discussions: Full with full interventions according to her state guardian.  Her primary care provider discussed her case with them prior to transfer.    Disposition  Social Determinants of Health that impact treatment or disposition: Lives in a group home with an intellectual disability.  [place] of the [facility].  Estimated length of stay is 3-5 days.     I confirmed that the patient's advanced care plan is present, code status is documented, and a surrogate decision maker is listed in the patient's medical record.     The patient's surrogate decision maker is her state guardian in [place].     The patient was seen and examined by me on 05/05/23 at 1745.    Electronically signed by David HOWELL. DO [place], 05/05/23, 19:13 CDT.    I spent 35 minutes providing critical care management to this patient. This excludes time spent in performing separately billed procedures.                   Electronically signed by David Laird DO at 05/05/23 1924          Operative/Procedure Notes (most recent note)      Sherif Villegas MD at 05/09/23 1212        ESOPHAGOGASTRODUODENOSCOPY    Date:  5/9/2023    Indications:   1.  Acute blood loss anemia  2.   Gastric ulcer       Procedure: ESOPHAGOGASTRODUODENOSCOPY     Sedation: As per anesthesia.    Surgeon: Sherif Villegas MD    Anesthesia: Monitored Anesthesia Care     Procedure  Description: After informed consent was obtained, a timeout was called in the endoscopy suite to confirm the correct patient and appropriate procedure.  Thereafter with the patient in the left lateral position, esophagus was intubated under direct vision with adult Olympus gastroscope.  Thereafter scope was slowly advanced into the second portion of the duodenum under direct vision.  Careful examination of the duodenum, stomach and esophagus was performed while slowly withdrawing the scope.  Retroflex examination of the gastric cardia and fundus were also performed.    Findings:   Esophagus:  Normal esophageal body mucosa.  Normal Z-line without esophagitis endoscopically.  No Breanne-Wang tear.  No varices.    Stomach:  Originally described group of ulcers revisualized with all clips still attached.  Surrounding petechiae.  The largest ulcer revealed that the clot had migrated and the underlying ulcer revealed a 3 mm visible vessel starting to epithelialize.  2 large hemoclips placed directly over vessel with excellent positioning.  No residual bleeding noted.  No other high risk stigmata noted.  No blood seen.    Duodenum:  Bulbar duodenitis.  No blood seen.  Bile noted.    Complications: No immediate complications.    Recommendations:   1.  Advance to puréed diet  2.  Pantoprazole IV while in the hospital then change to pantoprazole p.o. twice daily until follow-up EGD  3.  Patient should have repeat EGD in 4 to 6 weeks to confirm ulcer healing.  Will arrange with office  4.  Avoid NSAIDs/aspirin if possible     Procedure CPT code: 24645    Post-Op Diagnosis Codes:     * Acute blood loss anemia [D62]     * Hypovolemic shock [R57.1]    Sherif Villegas MD        Electronically signed by Sherif Villegas MD at 05/09/23 1233          Physician  Progress Notes (most recent note)      Gallo Hazel MD at 05/11/23 1034              [facility] Medicine Services  INPATIENT PROGRESS NOTE    Patient Name: Jess Ramsey  Date of Admission: 5/5/2023  Today's Date: 05/11/23  Length of Stay: 6  Primary Care Physician: David Frazier MD    Subjective   Chief Complaint: Altered mental status/pneumonia/GI bleed    HPI   Patient is on 2 L.  Patient no acute distress.  Hypokalemia resolved.  Will reevaluate liver enzyme, BMP in a.m., acute hepatitis panel, ultrasound the liver.  Thrombocytopenia improving.  Slight decrease in hemoglobin, no sign of acute bleed.    Review of Systems   Unable to obtain due to difficult to communicate, patient is unable to talk, chronic condition.  All pertinent negatives and positives are as above. All other systems have been reviewed and are negative unless otherwise stated.     Objective    Temp:  [97.6 °F (36.4 °C)-98.4 °F (36.9 °C)] 97.6 °F (36.4 °C)  Heart Rate:  [85-95] 88  Resp:  [16-18] 16  BP: (125-132)/(68-76) 130/72  Physical Exam  Vitals and nursing note reviewed.   Constitutional:       Comments: Chronically ill.   Nonverbal  HENT:      Head: Normocephalic.   Eyes:      Conjunctiva/sclera: Conjunctivae normal.      Pupils: Pupils are equal, round, and reactive to light.   Neck:      Vascular: No JVD.   Cardiovascular:      Rate and Rhythm: Normal rate and regular rhythm.      Heart sounds: Normal heart sounds.   Pulmonary:      Effort: No respiratory distress.      Breath sounds: No wheezing or rales.      Comments: Patient is on 2 L of oxygen, clear, diminished breath sound bilateral.  Clear bilateral.  Chest:      Chest wall: No tenderness.   Abdominal:      General: Bowel sounds are normal. There is no distension.      Palpations: Abdomen is soft.      Tenderness: There is no abdominal tenderness.   Musculoskeletal:         General: No tenderness or deformity.      Cervical back: Neck  supple.  Arthritis of the hand with deformity.  Patient is bedbound.  Skin:     General: Skin is warm and dry.      Capillary Refill: Capillary refill takes 2 to 3 seconds.      Findings: No rash.   Neurological:      Cranial Nerves: No cranial nerve deficit.      Motor: Weakness present. No abnormal muscle tone.      Coordination: Coordination abnormal.      Gait: Gait abnormal.      Deep Tendon Reflexes: Reflexes normal.             Results Review:  I have reviewed the labs, radiology results, and diagnostic studies.    Laboratory Data:   Results from last 7 days   Lab Units 05/11/23  0505 05/09/23  0845 05/08/23  0401   WBC 10*3/mm3 9.44 7.43 7.40   HEMOGLOBIN g/dL 8.3* 9.0* 9.5*   HEMATOCRIT % 25.6* 27.2* 28.4*   PLATELETS 10*3/mm3 83* 72* 77*        Results from last 7 days   Lab Units 05/11/23  0505 05/09/23  1049 05/08/23  0455 05/07/23  0537 05/06/23  0400 05/05/23  2000   SODIUM mmol/L 144 139 150* 153*   < > 144   POTASSIUM mmol/L 3.8 3.4* 3.3* 3.1*   < > 3.9   CHLORIDE mmol/L 110* 107 117* 123*   < > 114*   CO2 mmol/L 29.0 24.0 26.0 26.0   < > 25.0   BUN mg/dL 7* 6* 18 31*   < > 56*   CREATININE mg/dL 0.47* 0.35* 0.45* 0.51*   < > 0.83   CALCIUM mg/dL 8.4* 8.1* 8.5* 8.3*   < > 7.8*   BILIRUBIN mg/dL  --   --  0.6 0.7  --  0.3   ALK PHOS U/L  --   --  60 51  --  53   ALT (SGPT) U/L  --   --  48* 42*  --  54*   AST (SGOT) U/L  --   --  40* 39*  --  39*   GLUCOSE mg/dL 100* 117* 87 78   < > 145*    < > = values in this interval not displayed.       Culture Data:   Blood Culture   Date Value Ref Range Status   05/05/2023 No growth at 5 days  Final       Radiology Data:   Imaging Results (Last 24 Hours)     ** No results found for the last 24 hours. **          I have reviewed the patient's current medications.     Assessment/Plan   Assessment  Active Hospital Problems    Diagnosis    • **Septic shock    • Right lower lobe pneumonia    • Intellectual disability    • Anemia    • Thrombocytopenia    •  Transaminitis    • Hypoxia    • Metabolic encephalopathy    • Acute blood loss anemia    • Hypovolemic shock        Treatment Plan  HPI . Patient will be admitted at [facility].  She is transferred here from [facility] in [facility].  Her primary care provider had her in the hospital there for the last 2 days.  She presented with hypothermia and altered mental status.  He thought at first she was just dehydrated and then she ultimately developed a right lower lobe pneumonia and some hypoxemia after initially having clear chest imaging and a normal urinalysis.  She became hypotensive.  A central line was placed and she was started on norepinephrine and sent here.     GI bleed.  Black tarry stool improving.  GI consult.  Hemoglobin stable.  Protonix drip.  EGD 5/6/2023- 2 subcentimeter fusiform ulcers with overlying eschar and dark adherent clot- 2 clip was placed, epinephrine was injected, tattoo ink.  Second EGD 5/9/2023- original described ulcers visualized- all clips still attached-surrounding petechiae, large also reveals blood clot has migrated and underlying ulcer revealed 3 mm visible vessels starting to epithelialize-no residual bleeding-no other high risk stigmata-no blood seen, duodenum-bulbar duodenitis-no blood seen-bile noted.  Vascular recommendation- conservative treatment.  If continued bleeding or worsening of hemoglobin and hypotension then to consider coil embolization.  General surgery recommendation- advance diet to purée, IV Protonix while in the hospital-change to p.o. Protonix twice a day until follow-up EGD, EGD in 4 to 6 weeks to confirm ulcer healing-we will arrange an office, avoid aspirin and NSAID.  Follow-up with GI 2 to 4 weeks postdischarge.     Pneumonia/patient arrives somnolent/hypothermia/altered mental status . Zosyn.  Vancomycin DC 5/6/2023 due to negative MRSA screen..  Chest x-ray-  Right-sided CVL with tip overlying the low SVC,  No  pneumothorax,  Veiling RIGHT basilar opacity, likely representing small layering pleural effusion and/or atelectasis.  Patient is currently on 2 L of oxygen.     Hypotension.  Resolved.  Levophed DC yesterday 5/6/2023.     Hypernatremia.  Resolved.     Hypokalemia.  Resolved.     Anemia.  Status post 3 units of blood transfusion last night.  Hemoglobin slight decrease.  No sign of acute bleed.     Thrombocytopenia.    Status post 1 unit of platelets transfusion.  Platelet improving.     Elevated liver enzyme.  CMP in AM.  Ultrasound the liver.  Acute hepatitis panel.     Buttock wound, stage II.  Wound care consult.      History of stroke and cognitive deficit according to transfer physician.   Patient unable to provide any history.  Chronic right-sided weakness, nonverbal, patient does ambulate at baseline.     Patient from a group home in [facility].     Nutrition .  Puréed.     Deconditioning . PT and OT consult     Blood culture with SUSHIL-  no growth in 3 days.  Legionella antigen-negative.  MRSA screen-negative.  Strep pneumo-negative.       Patient transferred from [facility].   Patient is a snow of [facility].  [phone]-[id number] [place] emergency contact, snow of [facility].   Patient lives in [facility].  At baseline patient get around a wheelchair, unable to use spoon to feed herself, she can  food with her hand and had to be spoon fed. Behavior wise baseline aggressive and yelling, this has been better with change in psych medications.      Palliative care consult . Consult  for nursing home placement.     Medical Decision Making  Number and Complexity of problems: Hypotension/pneumonia/GI bleed/anemia/failure to thrive/buttock wound/hyponatremia  Differential Diagnosis: None.     Conditions and Status        Improving.     [facility] Data  External documents reviewed: Previous note .  Cardiac tracing (EKG, telemetry) interpretation:  Sinus .  Radiology interpretation: X-ray .  Labs reviewed: Laboratory  Any tests that were considered but not ordered: Lab now and in AM.     Decision rules/scores evaluated (example [id number], Wells, etc): None     Discussed with: Nursing and patient.     Care Planning  Shared decision making: Nursing and patient  Code status and discussions: Full code.  [place] [facility]     Disposition  Social Determinants of Health that impact treatment or disposition: Patient is from a group home, [facility].  Pending rehab placement.     Electronically signed by Gallo Hazel MD, 05/11/23, 10:34 CDT.    Electronically signed by Gallo Hazel MD at 05/11/23 1048          Consult Notes (most recent note)      Monster Mora DO at 05/08/23 1301          Jess Ramsey  [id number]  [id number]  I004/1  Gallo Hazel MD  5/5/2023    CC: Upper GI bleed with bleeding gastric ulcer    HPI: The patient is a 72-year-old -American female who lives in a group home in [place].  [place]  She has had a prior stroke with cognitive deficit.  She was admitted to our facility on 5/3/2023 with hypotension and hypothermia.  During work-up, she was found to have a right lower lobe pneumonia.  Since her admission she started having large melanotic stools.  She underwent EGD with control of the bleeding gastric ulcer near the lesser curve of the stomach.  She has been evaluated by general surgery as well.      Past Medical History:   Diagnosis Date   • Gastritis    • Hypertension    • Intellectual disability    • Psychosis    • Right hemiparesis    • Stroke        Past Surgical History:   Procedure Laterality Date   • ENDOSCOPY N/A 5/6/2023    Procedure: ESOPHAGOGASTRODUODENOSCOPY WITH ANESTHESIA;  Surgeon: Sherif Villegas MD;  [facility];  Service: Gastroenterology;  Laterality: N/A;  PRE GI BLEED  POST gastric ulcers,ink and clip   • HIP SURGERY Right 2019       Family History   Family  history unknown: Yes       Social History     Socioeconomic History   • Marital status: Single   Tobacco Use   • Smoking status: Unknown   Vaping Use   • Vaping Use: Never used   Substance and Sexual Activity   • Alcohol use: Defer   • Drug use: Defer   • Sexual activity: Defer       No Known Allergies    Hospital Medications (active)       Dose Frequency Start End    acetaminophen (TYLENOL) 160 MG/5ML solution 650 mg 650 mg Every 4 Hours PRN 5/5/2023     Admin Instructions: Based on patient request - if ordered for moderate or severe pain, provider allows for administration of a medication prescribed for a lower pain scale.  Do not exceed 4 grams of acetaminophen in a 24 hr period. Max dose of 2gm for AST/ALT greater than 120 units/L    If given for fever, use fever parameter: fever greater than 100.4 °F.    If given for pain, use the following pain scale:   Mild Pain = Pain Score of 1-3, CPOT 1-2  Moderate Pain = Pain Score of 4-6, CPOT 3-4  Severe Pain = Pain Score of 7-10, CPOT 5-8    Route: Oral    Linked Group 1: See [employer] for full Linked Orders Report.        acetaminophen (TYLENOL) suppository 650 mg 650 mg Every 4 Hours PRN 5/5/2023     Admin Instructions: Based on patient request - if ordered for moderate or severe pain, provider allows for administration of a medication prescribed for a lower pain scale.  Do not exceed 4 grams of acetaminophen in a 24 hr period. Max dose of 2gm for AST/ALT greater than 120 units/L    If given for fever, use fever parameter: fever greater than 100.4 °F.    If given for pain, use the following pain scale:   Mild Pain = Pain Score of 1-3, CPOT 1-2  Moderate Pain = Pain Score of 4-6, CPOT 3-4  Severe Pain = Pain Score of 7-10, CPOT 5-8    Route: Rectal    Linked Group 1: See [employer] for full Linked Orders Report.        acetaminophen (TYLENOL) tablet 650 mg 650 mg Every 4 Hours PRN 5/5/2023     Admin Instructions: Based on patient request - if ordered for moderate or  severe pain, provider allows for administration of a medication prescribed for a lower pain scale.  Do not exceed 4 grams of acetaminophen in a 24 hr period. Max dose of 2gm for AST/ALT greater than 120 units/L    If given for fever, use fever parameter: fever greater than 100.4 °F.    If given for pain, use the following pain scale:   Mild Pain = Pain Score of 1-3, CPOT 1-2  Moderate Pain = Pain Score of 4-6, CPOT 3-4  Severe Pain = Pain Score of 7-10, CPOT 5-8    Route: Oral    Linked Group 1: See [employer] for full Linked Orders Report.        Chlorhexidine Gluconate Cloth 2 % pads 1 application 1 application Every 24 Hours 5/8/2023     Admin Instructions: Use for admission bath and continue for length of critical care stay.    Route: Topical    dextrose 5 % with KCl 20 mEq/L infusion 50 mL/hr Continuous 5/7/2023     Route: Intravenous    ipratropium-albuterol (DUO-NEB) nebulizer solution 3 mL 3 mL Every 6 Hours PRN 5/6/2023     Admin Instructions: Include Respiratory Treatment Education    Route: Nebulization    Morphine sulfate (PF) injection 1 mg 1 mg Every 4 Hours PRN 5/7/2023 5/14/2023    Admin Instructions: Based on patient request - if ordered for moderate or severe pain, provider allows for administration of a medication prescribed for a lower pain scale.      Caution: Look alike/sound alike drug alert    If given for pain, use the following pain scale:  Mild Pain = Pain Score of 1-3, CPOT 1-2  Moderate Pain = Pain Score of 4-6, CPOT 3-4  Severe Pain = Pain Score of 7-10, CPOT 5-8    Route: Intravenous    mupirocin (BACTROBAN) 2 % nasal ointment 1 application 1 application 2 Times Daily 5/7/2023 5/12/2023    Admin Instructions: Begin on day 1 of ICU admission and continue for 5 days OR until critical care discharge (if prior to 5 days).      Route: Each Nare    ondansetron (ZOFRAN) injection 4 mg 4 mg Every 6 Hours PRN 5/5/2023     Admin Instructions: If BOTH ondansetron (ZOFRAN) and promethazine  "(PHENERGAN) are ordered use ondansetron first and THEN promethazine IF ondansetron is ineffective.    Route: Intravenous    pantoprazole (PROTONIX) 40 mg in 100mL NS IVPB 8 mg/hr Continuous 5/6/2023     Admin Instructions: Break seal and mix to activate vial before use    Route: Intravenous    Pharmacy to Dose Zosyn  Continuous PRN 5/5/2023 5/8/2023    Route: Does not apply    piperacillin-tazobactam (ZOSYN) 4.5 g in iso-osmotic dextrose 100 mL IVPB (premix) 4.5 g Every 8 Hours 5/6/2023 5/9/2023    Admin Instructions: Refrigerate    Route: Intravenous    sodium chloride 0.9 % flush 10 mL 10 mL Every 12 Hours Scheduled 5/5/2023     Route: Intravenous    sodium chloride 0.9 % flush 10 mL 10 mL As Needed 5/5/2023     Route: Intravenous    sodium chloride 0.9 % infusion 40 mL 40 mL As Needed 5/5/2023     Admin Instructions: Following administration of an IV intermittent medication, flush line with 40mL NS at 100mL/hr.    Route: Intravenous    ziprasidone (GEODON) injection 10 mg 10 mg Every 12 Hours PRN 5/7/2023     Admin Instructions: DO NOT administer IV. Max dose 40 mg/day.  Group 2 (Pink) Hazardous Drug - Reproductive Risk Only - See Handling Guide    Route: Intramuscular          Review of Systems   Unable to perform ROS: Dementia   Constitutional: Negative.    HENT: Negative.    Eyes: Negative.    Respiratory: Negative.    Cardiovascular: Negative.    Gastrointestinal: Negative.    Endocrine: Negative.    Genitourinary: Negative.    Musculoskeletal: Negative.    Skin: Negative.    Allergic/Immunologic: Negative.    Neurological: Negative.    Hematological: Negative.    Psychiatric/Behavioral: Negative.        /84   Pulse 81   Temp 97.7 °F (36.5 °C) (Oral)   Resp 20   Ht 154.9 cm (61\")   Wt 59.6 kg (131 lb 6.4 oz)   SpO2 96%   BMI 24.83 kg/m²     Physical Exam  Vitals and nursing note reviewed.   Constitutional:       Appearance: She is well-developed.   HENT:      Head: Normocephalic and atraumatic. "   Eyes:      General: No scleral icterus.     Pupils: Pupils are equal, round, and reactive to light.   Neck:      Thyroid: No thyromegaly.      Vascular: No carotid bruit or JVD.   Cardiovascular:      Rate and Rhythm: Normal rate and regular rhythm.      Pulses:           Carotid pulses are 2+ on the right side and 2+ on the left side.       Femoral pulses are 2+ on the right side and 2+ on the left side.       Popliteal pulses are 2+ on the right side and 2+ on the left side.        Dorsalis pedis pulses are 2+ on the right side and 2+ on the left side.        Posterior tibial pulses are 2+ on the right side and 2+ on the left side.      Heart sounds: Normal heart sounds.   Pulmonary:      Effort: Pulmonary effort is normal.      Breath sounds: Normal breath sounds.   Abdominal:      General: Bowel sounds are normal. There is no distension or abdominal bruit.      Palpations: Abdomen is soft. There is no mass.      Tenderness: There is no abdominal tenderness.   Musculoskeletal:         General: Normal range of motion.      Cervical back: Neck supple.   Lymphadenopathy:      Cervical: No cervical adenopathy.   Skin:     General: Skin is warm and dry.   Neurological:      Mental Status: She is oriented to person, place, and time.      Cranial Nerves: No cranial nerve deficit.      Sensory: No sensory deficit.         Laboratory Data:  Results from last 7 days   Lab Units 05/08/23  0401 05/07/23  1547 05/07/23  1156 05/07/23  0537 05/06/23  0834 05/06/23  0400   WBC 10*3/mm3 7.40  --   --  8.56  --  8.66   HEMOGLOBIN g/dL 9.5* 9.7* 9.7* 8.2*   < > 10.0*   HEMATOCRIT % 28.4* 28.7* 28.0* 23.4*   < > 30.4*   PLATELETS 10*3/mm3 77*  --   --  84*  --  59*    < > = values in this interval not displayed.       Results from last 7 days   Lab Units 05/08/23  0455 05/07/23  0537 05/06/23  0400 05/05/23 2000   SODIUM mmol/L 150* 153* 147* 144   POTASSIUM mmol/L 3.3* 3.1* 3.6 3.9   CHLORIDE mmol/L 117* 123* 120* 114*   CO2  mmol/L 26.0 26.0 19.0* 25.0   BUN mg/dL 18 31* 52* 56*   CREATININE mg/dL 0.45* 0.51* 0.62 0.83   CALCIUM mg/dL 8.5* 8.3* 7.5* 7.8*   BILIRUBIN mg/dL 0.6 0.7  --  0.3   ALK PHOS U/L 60 51  --  53   ALT (SGPT) U/L 48* 42*  --  54*   AST (SGOT) U/L 40* 39*  --  39*   GLUCOSE mg/dL 87 78 156* 145*     Results from last 7 days   Lab Units 23  1156 23  0839   INR  1.09 1.38*   APTT seconds  --  39.0*         Diagnostic Data:  Imaging Results (Last 24 Hours)     ** No results found for the last 24 hours. **          Impression:    Septic shock    Right lower lobe pneumonia    Intellectual disability    Anemia    Thrombocytopenia    Transaminitis    Hypoxia    Metabolic encephalopathy    Acute blood loss anemia    Hypovolemic shock      Plan: After thoroughly evaluating Jess Ramsey, I believe the best course of action is to remain conservative from a vascular surgery standpoint.  Currently her H&H have remained stable.  She will undergo EGD tomorrow by Dr. Villegas for reevaluation of this area.  If she does have continued bleeding or worsening H&H/hypotension then coil embolization can be considered.  This was discussed with both general surgery and GI.  Thank you for allowing me to see Jess Ramsey in consult. Please feel free to reach out with any questions or concerns.    Monster Mora DO  2023  13:01 CDT       Electronically signed by Monster Mora DO at 23 1306          Nutrition Notes (most recent note)      Chanda Castro RD at 23 1347        Goal Outcome Evaluation:              Outcome Evaluation: NTN follow up. Pt out of room at time of attempted visit. Pureed food approved by GI. Added Magic Cup with all meals. NTN following per protocol.           Electronically signed by Chanda Castro RD at 23 1347          Physical Therapy Notes (most recent note)      Kala Rodriguez, PT DPT at 05/10/23 1611  Version 1 of 1         Patient Name: Jess Ramsey  :  1950    MRN: [id number]                              Today's Date: 5/10/2023       Admit Date: 5/5/2023    Visit Dx:     ICD-10-CM ICD-9-CM   1. Acute blood loss anemia  D62 285.1   2. Hypovolemic shock  R57.1 785.59   3. Oral phase dysphagia  R13.11 787.21   4. Acute gastric ulcer with hemorrhage  K25.0 531.00   5. Impaired mobility and ADLs  Z74.09 V49.89    Z78.9    6. Impaired mobility  Z74.09 799.89     Patient Active Problem List   Diagnosis   • Septic shock   • Right lower lobe pneumonia   • Intellectual disability   • Anemia   • Thrombocytopenia   • Transaminitis   • Hypoxia   • Metabolic encephalopathy   • Acute blood loss anemia   • Hypovolemic shock     Past Medical History:   Diagnosis Date   • Gastritis    • Hypertension    • Intellectual disability    • Psychosis    • Right hemiparesis    • Stroke      Past Surgical History:   Procedure Laterality Date   • ENDOSCOPY N/A 5/6/2023    Procedure: ESOPHAGOGASTRODUODENOSCOPY WITH ANESTHESIA;  Surgeon: Sherif Villegas MD;  Location: [facility] OR;  Service: Gastroenterology;  Laterality: N/A;  PRE GI BLEED  POST gastric ulcers,ink and clip   • ENDOSCOPY N/A 5/9/2023    Procedure: ESOPHAGOGASTRODUODENOSCOPY WITH ANESTHESIA;  Surgeon: Sherif Villegas MD;  Location: [facility] ENDOSCOPY;  Service: Gastroenterology;  Laterality: N/A;  Pre: Anemia  Post: Gastric ulcers  David Frazier MD   • HIP SURGERY Right 2019      General Information     Row Name 05/10/23 1430          Physical Therapy Time and Intention    Document Type evaluation  -SB     Mode of Treatment physical therapy  -SB     Row Name 05/10/23 1430          General Information    Patient Profile Reviewed yes  -SB     Prior Level of Function dependent:;ADL's;max assist:;transfer;bed mobility;min assist:;w/c or scooter  per facility pt needed assist with t/fs to w/c, was able to self-propel w/c  -SB     Existing Precautions/Restrictions fall;oxygen therapy device and L/min  2.5L O2  -SB     Barriers  to Rehab cognitive status;medically complex;previous functional deficit;language barrier  -SB     Row Name 05/10/23 1430          Living Environment    People in Home facility resident  -SB     Row Name 05/10/23 1430          Cognition    Orientation Status (Cognition) unable/difficult to assess  -SB     Row Name 05/10/23 1430          Safety Issues, Functional Mobility    Safety Issues Affecting Function (Mobility) ability to follow commands;friction/shear risk;judgment;safety precaution awareness;safety precautions follow-through/compliance  -SB     Impairments Affecting Function (Mobility) balance;cognition;strength;endurance/activity tolerance;grasp;pain;muscle tone abnormal;postural/trunk control;range of motion (ROM)  -SB     Cognitive Impairments, Mobility Safety/Performance attention;awareness, need for assistance;impulsivity;insight into deficits/self-awareness;judgment;safety precaution awareness;safety precaution follow-through  -SB           User Key  (r) = Recorded By, (t) = Taken By, (c) = Cosigned By    Initials Name Provider Type    Kala Erickson PT DPT Physical Therapist               Mobility     Row Name 05/10/23 1430          Bed Mobility    Bed Mobility supine-sit;sit-supine;rolling left;rolling right;scooting/bridging  -SB     Rolling Left [place] (Bed Mobility) maximum assist (25% patient effort)  -SB     Rolling Right [place] (Bed Mobility) moderate assist (50% patient effort)  -SB     Scooting/Bridging [place] (Bed Mobility) dependent (less than 25% patient effort);2 person assist  -SB     Supine-Sit [place] (Bed Mobility) maximum assist (25% patient effort);2 person assist  -SB     Sit-Supine [place] (Bed Mobility) maximum assist (25% patient effort);2 person assist  -SB     Assistive Device (Bed Mobility) bed rails;draw sheet;head of bed elevated  -SB     Comment, (Bed Mobility) sat EOB for approx 5 min with min-mod A to maintain static sitting balance  -SB      Row Name 05/10/23 1430          Transfers    Comment, (Transfers) not safe to attempt at this time  -SB           User Key  (r) = Recorded By, (t) = Taken By, (c) = Cosigned By    Initials Name Provider Type    Kala Erickson PT DPT Physical Therapist               Obj/Interventions     Row Name 05/10/23 1430          Range of Motion Comprehensive    General Range of Motion lower extremity range of motion deficits identified  -SB     Comment, General Range of Motion PROM WFL; unable to assess AROM due to cog status  -SB     Row Name 05/10/23 1430          Strength Comprehensive (MMT)    General Manual Muscle Testing (MMT) Assessment lower extremity strength deficits identified  -SB     Comment, General Manual Muscle Testing (MMT) Assessment observed 2-/5 on BLE  -SB     Row Name 05/10/23 1430          Balance    Balance Assessment sitting static balance;sitting dynamic balance  -SB     Static Sitting Balance minimal assist;moderate assist  -SB     Dynamic Sitting Balance moderate assist;2-person assist  -SB     Position, Sitting Balance sitting edge of bed;unsupported  -SB     Row Name 05/10/23 1430          Sensory Assessment (Somatosensory)    Sensory Assessment (Somatosensory) unable/difficult to assess  -SB           User Key  (r) = Recorded By, (t) = Taken By, (c) = Cosigned By    Initials Name Provider Type    Kala Erickson PT DPT Physical Therapist               Goals/Plan     Row Name 05/10/23 1430          Bed Mobility Goal 1 (PT)    Activity/Assistive Device (Bed Mobility Goal 1, PT) sit to supine;supine to sit;rolling to right;rolling to left  -SB     [place] Level/Cues Needed (Bed Mobility Goal 1, PT) moderate assist (50-74% patient effort)  -SB     Time Frame (Bed Mobility Goal 1, PT) long term goal (LTG)  -SB     Progress/Outcomes (Bed Mobility Goal 1, PT) new goal  -SB     Row Name 05/10/23 1430          Transfer Goal 1 (PT)    Activity/Assistive Device (Transfer Goal 1, PT)  bed-to-chair/chair-to-bed;wheelchair transfer  -SB     [place] Level/Cues Needed (Transfer Goal 1, PT) moderate assist (50-74% patient effort)  -SB     Time Frame (Transfer Goal 1, PT) long term goal (LTG)  -SB     Progress/Outcome (Transfer Goal 1, PT) new goal  -SB     Row Name 05/10/23 1430          Therapy Assessment/Plan (PT)    Planned Therapy Interventions (PT) balance training;bed mobility training;patient/family education;transfer training;postural re-education;strengthening;ROM (range of motion);stretching;wheelchair management/propulsion training  -SB           User Key  (r) = Recorded By, (t) = Taken By, (c) = Cosigned By    Initials Name Provider Type    Kala Erickson, ALIX DPT Physical Therapist               Clinical Impression     Row Name 05/10/23 1430          Pain    Pain Intervention(s) Medication (See MAR);Repositioned;Ambulation/increased activity  -SB     Additional Documentation Pain Scale: FACES Pre/Post-Treatment (Group)  -SB     Row Name 05/10/23 1430          Pain Scale: FACES Pre/Post-Treatment    Pain: FACES Scale, Pretreatment 0-->no hurt  -SB     Posttreatment Pain Rating 0-->no hurt  -SB     Row Name 05/10/23 1430          Plan of Care Review    Plan of Care Reviewed With patient  -SB     Progress no change  -SB     Outcome Evaluation PT eval completed. Pt nonverbal, produces sounds but not words, but able to nod head at times. OT called group home facility, per facility but was typically able to t/f with assist x1 to w/c, self propel w/c through facility. Today, pt reqd max-dep x2 for all bed mobility and sat EOB for approx 5 min with min-mod A. Unable to formally assess strength due to command following. Pt returned to supine due to fatigue and increasing assist needs. Pt is not at baseline per facility report and will benefit from skilled PT to improve bed mob, t/fs and w/c mobility. WIll monitor progress and participation closely. Recommend d/c SNF.  -SB     Row Name  05/10/23 1430          Therapy Assessment/Plan (PT)    Patient/Family Therapy Goals Statement (PT) none stated  -SB     Rehab Potential (PT) fair, will monitor progress closely  -SB     Criteria for Skilled Interventions Met (PT) yes;meets criteria;skilled treatment is necessary  -SB     Therapy Frequency (PT) daily  -SB     Predicted Duration of Therapy Intervention (PT) until d/c or goals met  -SB     Row Name 05/10/23 1430          Vital Signs    Pre SpO2 (%) 96  -SB     O2 Delivery Pre Treatment room air  2.5L O2 via NC doffed upon arrival  -SB     O2 Delivery Intra Treatment room air  -SB     O2 Delivery Post Treatment nasal cannula  -SB     Row Name 05/10/23 1430          Positioning and Restraints    Pre-Treatment Position in bed  -SB     Post Treatment Position bed  -SB     In Bed notified nsg;fowlers;encouraged to call for assist;exit alarm on;side rails up x3;call light within reach;SCD pump applied  -SB           User Key  (r) = Recorded By, (t) = Taken By, (c) = Cosigned By    Initials Name Provider Type    Kala Erickson, PT DPT Physical Therapist               Outcome Measures     Row Name 05/10/23 1430 05/10/23 0800       How much help from another person do you currently need...    Turning from your back to your side while in flat bed without using bedrails? 1  -SB 1  -WM    Moving from lying on back to sitting on the side of a flat bed without bedrails? 1  -SB 1  -WM    Moving to and from a bed to a chair (including a wheelchair)? 1  -SB 1  -WM    Standing up from a chair using your arms (e.g., wheelchair, bedside chair)? 1  -SB 1  -WM    Climbing 3-5 steps with a railing? 1  -SB 1  -WM    To walk in hospital room? 1  -SB 1  -WM    AM-PAC 6 Clicks Score (PT) 6  -SB 6  -WM    Highest level of mobility 2 --> Bed activities/dependent transfer  -SB 2 --> Bed activities/dependent transfer  -WM    Row Name 05/10/23 1430 05/10/23 1423       Functional Assessment    Outcome Measure Options AM-PAC 6  Clicks Basic Mobility (PT)  -SB AM-PAC 6 Clicks Daily Activity (OT)  -ЮЛИЯ          User Key  (r) = Recorded By, (t) = Taken By, (c) = Cosigned By    Initials Name Provider Type    Laurita De La O, RN Registered Nurse    Kala Erickson, PT DPT Physical Therapist    Jessenia Meraz, OTR/L, CSRS Occupational Therapist                             Physical Therapy Education     Title: PT OT SLP Therapies (In Progress)     Topic: Physical Therapy (In Progress)     Point: Mobility training (In Progress)     Learning Progress Summary           Patient Nonacceptance, E, NL by SB at 5/10/2023 1610    Comment: pt edu on POC, benefits of act and d/c plans                   Point: Home exercise program (Not Started)     Learner Progress:  Not documented in this visit.          Point: Body mechanics (Not Started)     Learner Progress:  Not documented in this visit.          Point: Precautions (In Progress)     Learning Progress Summary           Patient Nonacceptance, E, NL by SB at 5/10/2023 1610    Comment: pt edu on POC, benefits of act and d/c plans                               User Key     Initials Effective Dates Name Provider Type Discipline     06/16/21 -  Kala Rodriguez, PT DPT Physical Therapist PT              PT Recommendation and Plan  Planned Therapy Interventions (PT): balance training, bed mobility training, patient/family education, transfer training, postural re-education, strengthening, ROM (range of motion), stretching, wheelchair management/propulsion training  Plan of Care Reviewed With: patient  Progress: no change  Outcome Evaluation: PT eval completed. Pt nonverbal, produces sounds but not words, but able to nod head at times. OT called group home facility, per facility but was typically able to t/f with assist x1 to w/c, self propel w/c through facility. Today, pt reqd max-dep x2 for all bed mobility and sat EOB for approx 5 min with min-mod A. Unable to formally assess strength due to  command following. Pt returned to supine due to fatigue and increasing assist needs. Pt is not at baseline per facility report and will benefit from skilled PT to improve bed mob, t/fs and w/c mobility. WIll monitor progress and participation closely. Recommend d/c SNF.     Time Calculation:    PT Charges     Row Name 05/10/23 1611             Time Calculation    Start Time 1430  -SB      Stop Time 1510  -SB      Time Calculation (min) 40 min  -SB      PT Received On 05/10/23  -SB      PT Goal Re-Cert Due Date 23  -SB            User Key  (r) = Recorded By, (t) = Taken By, (c) = Cosigned By    Initials Name Provider Type    SB Kala Rodriguez, PT DPT Physical Therapist              Therapy Charges for Today     Code Description Service Date Service Provider Modifiers Qty    62181883431 HC PT EVAL MOD COMPLEXITY 3 5/10/2023 Kala Rodriguez, PT DPT GP 1          PT G-Codes  Outcome Measure Options: AM-PAC 6 Clicks Basic Mobility (PT)  AM-PAC 6 Clicks Score (PT): 6  AM-PAC 6 Clicks Score (OT): 6  PT Discharge Summary  Anticipated Discharge Disposition (PT): skilled nursing facility    Kala Rodriguez PT DPT  5/10/2023      Electronically signed by Kala Rodriguez PT DPT at 05/10/23 1611          Occupational Therapy Notes (most recent note)      Jessenia Alexandre OTR/L, CSRS at 05/10/23 1530          Patient Name: Jess Ramsey  : 1950    MRN: [id number]                              Today's Date: 5/10/2023       Admit Date: 2023    Visit Dx:     ICD-10-CM ICD-9-CM   1. Acute blood loss anemia  D62 285.1   2. Hypovolemic shock  R57.1 785.59   3. Oral phase dysphagia  R13.11 787.21   4. Acute gastric ulcer with hemorrhage  K25.0 531.00   5. Impaired mobility and ADLs  Z74.09 V49.89    Z78.9      Patient Active Problem List   Diagnosis   • Septic shock   • Right lower lobe pneumonia   • Intellectual disability   • Anemia   • Thrombocytopenia   • Transaminitis   • Hypoxia   • Metabolic encephalopathy   •  Acute blood loss anemia   • Hypovolemic shock     Past Medical History:   Diagnosis Date   • Gastritis    • Hypertension    • Intellectual disability    • Psychosis    • Right hemiparesis    • Stroke      Past Surgical History:   Procedure Laterality Date   • ENDOSCOPY N/A 5/6/2023    Procedure: ESOPHAGOGASTRODUODENOSCOPY WITH ANESTHESIA;  Surgeon: Sherif Villegas MD;  Location: [facility] OR;  Service: Gastroenterology;  Laterality: N/A;  PRE GI BLEED  POST gastric ulcers,ink and clip   • ENDOSCOPY N/A 5/9/2023    Procedure: ESOPHAGOGASTRODUODENOSCOPY WITH ANESTHESIA;  Surgeon: Sherif Villegas MD;  Location: [facility] ENDOSCOPY;  Service: Gastroenterology;  Laterality: N/A;  Pre: Anemia  Post: Gastric ulcers  David Frazier MD   • HIP SURGERY Right 2019      General Information     Row Name 05/10/23 1423          OT Time and Intention    Document Type evaluation  -J     Mode of Treatment occupational therapy  -     Row Name 05/10/23 1423          General Information    Patient Profile Reviewed yes  -     Prior Level of Function dependent:;ADL's;max assist:;transfer;bed mobility;min assist:;w/c or scooter  -JJ     Existing Precautions/Restrictions oxygen therapy device and L/min;fall  R sided paralysis, cognitive status, non-verbal.  -J     Barriers to Rehab medically complex;previous functional deficit;physical barrier;cognitive status;language barrier  -     Row Name 05/10/23 1423          Living Environment    People in Home facility resident  -     Row Name 05/10/23 1423          Cognition    Orientation Status (Cognition) oriented to;person  -     Row Name 05/10/23 1423          Safety Issues, Functional Mobility    Impairments Affecting Function (Mobility) balance;cognition;strength;endurance/activity tolerance;grasp;pain;muscle tone abnormal;postural/trunk control;range of motion (ROM)  -     Cognitive Impairments, Mobility Safety/Performance attention;awareness, need for  assistance;impulsivity;insight into deficits/self-awareness;judgment;safety precaution awareness;problem-solving/reasoning;safety precaution follow-through  -           User Key  (r) = Recorded By, (t) = Taken By, (c) = Cosigned By    Initials Name Provider Type    Jessenia Meraz, APOLONIAR/L, CSRS Occupational Therapist                 Mobility/ADL's     Row Name 05/10/23 1423          Bed Mobility    Bed Mobility supine-sit;sit-supine;rolling right;rolling left;scooting/bridging  -J     Rolling Left [place] (Bed Mobility) maximum assist (25% patient effort)  -JJ     Rolling Right [place] (Bed Mobility) moderate assist (50% patient effort)  -JJ     Scooting/Bridging [place] (Bed Mobility) dependent (less than 25% patient effort);2 person assist  -JJ     Supine-Sit [place] (Bed Mobility) maximum assist (25% patient effort);2 person assist  -JJ     Sit-Supine [place] (Bed Mobility) maximum assist (25% patient effort);2 person assist  -JJ     Bed Mobility, Safety Issues decreased use of arms for pushing/pulling;decreased use of legs for bridging/pushing;impaired trunk control for bed mobility;cognitive deficits limit understanding  -J     Comment, (Bed Mobility) able to sit at EOB approx 5 minutes with Min- Mod A to maintain static sitting balance.  -     Row Name 05/10/23 1423          Transfers    Comment, (Transfers) not safe to attempt at this time.  -     Row Name 05/10/23 1423          Functional Mobility    Functional Mobility- Comment not safe to attempt at this time.  -     Row Name 05/10/23 1423          Activities of Daily Living    BADL Assessment/Intervention toileting;upper body dressing  -     Row Name 05/10/23 1423          Toileting Assessment/Training    [place] Level (Toileting) adjust/manage clothing;change pad/brief;perform perineal hygiene;dependent (less than 25% patient effort)  -     Position (Toileting) supine  -     Comment, (Toileting) found  incontinent of stool  -     Row Name 05/10/23 1423          Upper Body Dressing Assessment/Training    [place] Level (Upper Body Dressing) don;doff;maximum assist (25% patient effort);dependent (less than 25% patient effort)  -     Position (Upper Body Dressing) supine  -     Comment, [facility]  -           User Key  (r) = Recorded By, (t) = Taken By, (c) = Cosigned By    Initials Name Provider Type    Jessenia Fagan OTR/L, JOLENE Occupational Therapist               Obj/Interventions     Row Name 05/10/23 1505          Sensory Assessment (Somatosensory)    Sensory Assessment (Somatosensory) unable/difficult to assess  -     Row Name 05/10/23 1505          Vision Assessment/Intervention    Visual Impairment/Limitations unable/difficult to assess  -     Row Name 05/10/23 1505          Range of Motion Comprehensive    General Range of Motion upper extremity range of motion deficits identified  -     Comment, General Range of Motion Contractures of joints in multiple digits of B hands, impaired BUE AROM approx 50% d/t weakness.  -     Row Name 05/10/23 1505          Strength Comprehensive (MMT)    Comment, General Manual Muscle Testing (MMT) Assessment BUE strength grossly 2-/5, difficult to complete formal assessment d/t cognitive status  -     Row Name 05/10/23 1505          Balance    Balance Assessment sitting static balance;sitting dynamic balance  -     Static Sitting Balance minimal assist;moderate assist  -     Dynamic Sitting Balance moderate assist;2-person assist  -     Position, Sitting Balance supported;sitting edge of bed  -           User Key  (r) = Recorded By, (t) = Taken By, (c) = Cosigned By    Initials Name Provider Type    Jessenia Meraz OTR/L, JOLENE Occupational Therapist               Goals/Plan     Row Name 05/10/23 1423          Grooming Goal 1 (OT)    Activity/Device (Grooming Goal 1, OT) wash face, hands  -      [place] (Grooming Goal 1, OT) maximum assist (25-49% patient effort)  -JJ     Time Frame (Grooming Goal 1, OT) long term goal (LTG);by discharge  -J     Progress/Outcome (Grooming Goal 1, OT) new goal  -     Row Name 05/10/23 1423          Self-Feeding Goal 1 (OT)    Activity/Device (Self-Feeding Goal 1, OT) self-feeding skills, all  AE PRN  -J     [place] Level/Cues Needed (Self-Feeding Goal 1, OT) minimum assist (75% or more patient effort)  -JJ     Time Frame (Self-Feeding Goal 1, OT) long term goal (LTG);by discharge  -J     Progress/Outcomes (Self-Feeding Goal 1, OT) new goal  -     Row Name 05/10/23 1423          Therapy Assessment/Plan (OT)    Planned Therapy Interventions (OT) activity tolerance training;adaptive equipment training;BADL retraining;functional balance retraining;transfer/mobility retraining;strengthening exercise;occupation/activity based interventions;patient/caregiver education/training;passive ROM/stretching;ROM/therapeutic exercise  -           User Key  (r) = Recorded By, (t) = Taken By, (c) = Cosigned By    Initials Name Provider Type    Jessenia Meraz, APOLONIAR/L, CSRS Occupational Therapist               Clinical Impression     Row Name 05/10/23 0911          Pain Assessment    Pain Intervention(s) Medication (See MAR);Repositioned;Ambulation/increased activity  -     Row Name 05/10/23 1503          Plan of Care Review    Plan of Care Reviewed With patient  -     Outcome Evaluation OT eval completed. Pt presents alert and oriented to self only. Therapist called group home facility, per facility but was typically able to t/f with assist x1 to w/c, self propel w/c through facility, is able to complete self feeding with use of AE but elsewise is dependent for all other adls. Today she demonstrates contractures in digits of B hands with hemiparesis noted in R UE. She produces sounds throughout session but is not able to produce words. She required Max Ax2 for all  bed mobility. She was found incontinent of large stool and was Dep for all aspects of toileting. She was required Min-Mod A to maintain static sitting balance at EOB but only tolerate approx 5 minutes of activity. Returned to [facility] at end of session. Pt would benefit from skilled OT service but unsure of her ability to actively participate or retention of therapeutic skills/carryover. Will complete a short trial of therapy. Recommend d/c to SNF.  -     Row Name 05/10/23 1502          Therapy Assessment/Plan (OT)    Rehab Potential (OT) fair, will monitor progress closely  -     Criteria for Skilled Therapeutic Interventions Met (OT) yes;skilled treatment is necessary  -     Therapy Frequency (OT) 3 times/wk  -     Predicted Duration of Therapy Intervention (OT) 10 days  -     Row Name 05/10/23 1505          Therapy Plan Review/Discharge Plan (OT)    Anticipated Discharge Disposition (OT) [facility] nursing facility  -     Row Name 05/10/23 1501          Positioning and Restraints    Pre-Treatment Position in bed  -     Post Treatment Position bed  -JJ     In Bed notified nsg;fowlers;call light within reach;encouraged to call for assist;exit alarm on;side rails up x3  -           User Key  (r) = Recorded By, (t) = Taken By, (c) = Cosigned By    Initials Name Provider Type    Jessenia Meraz, OTR/L, CSRS Occupational Therapist               Outcome Measures     Row Name 05/10/23 2677          How much help from another is currently needed...    Putting on and taking off regular lower body clothing? 1  -JJ     Bathing (including washing, rinsing, and drying) 1  -JJ     Toileting (which includes using toilet bed pan or urinal) 1  -JJ     Putting on and taking off regular upper body clothing 1  -JJ     Taking care of personal grooming (such as brushing teeth) 1  -JJ     Eating meals 1  -JJ     AM-PAC 6 Clicks Score (OT) 6  -JJ     Row Name 05/10/23 0800          How much help from another person do  you currently need...    Turning from your back to your side while in flat bed without using bedrails? 1  -WM     Moving from lying on back to sitting on the side of a flat bed without bedrails? 1  -WM     Moving to and from a bed to a chair (including a wheelchair)? 1  -WM     Standing up from a chair using your arms (e.g., wheelchair, bedside chair)? 1  -WM     Climbing 3-5 steps with a railing? 1  -WM     To walk in hospital room? 1  -WM     AM-PAC 6 Clicks Score (PT) 6  -WM     Highest level of mobility 2 --> Bed activities/dependent transfer  -WM     Row Name 05/10/23 1430 05/10/23 1423       Functional Assessment    Outcome Measure Options AM-PAC 6 Clicks Basic Mobility (PT)  -SB AM-PAC 6 Clicks Daily Activity (OT)  -ЮЛИЯ          User Key  (r) = Recorded By, (t) = Taken By, (c) = Cosigned By    Initials Name Provider Type    WM Laurita Chand, RN Registered Nurse    Kala Erickson, PT DPT Physical Therapist    Jessenia Meraz, OTR/L, CSRS Occupational Therapist                Occupational Therapy Education     Title: PT OT SLP Therapies (In Progress)     Topic: Occupational Therapy (In Progress)     Point: ADL training (In Progress)     Description:   Instruct learner(s) on proper safety adaptation and remediation techniques during self care or transfers.   Instruct in proper use of assistive devices.              Learning Progress Summary           Patient Acceptance, E, NR,NL by ЮЛИЯ at 5/10/2023 1528                   Point: Home exercise program (Not Started)     Description:   Instruct learner(s) on appropriate technique for monitoring, assisting and/or progressing therapeutic exercises/activities.              Learner Progress:  Not documented in this visit.          Point: Precautions (In Progress)     Description:   Instruct learner(s) on prescribed precautions during self-care and functional transfers.              Learning Progress Summary           Patient Acceptance, E, NR,NL by ЮЛИЯ at  5/10/2023 1528                   Point: Body mechanics (Not Started)     Description:   Instruct learner(s) on proper positioning and spine alignment during self-care, functional mobility activities and/or exercises.              Learner Progress:  Not documented in this visit.                      User Key     Initials Effective Dates Name Provider Type Discipline     11/10/21 -  Jessenia Alexandre, OTR/L, CSRS Occupational Therapist OT              OT Recommendation and Plan  Planned Therapy Interventions (OT): activity tolerance training, adaptive equipment training, BADL retraining, functional balance retraining, transfer/mobility retraining, strengthening exercise, occupation/activity based interventions, patient/caregiver education/training, passive ROM/stretching, ROM/therapeutic exercise  Therapy Frequency (OT): 3 times/wk  Plan of Care Review  Plan of Care Reviewed With: patient  Outcome Evaluation: OT eval completed. Pt presents alert and oriented to self only. Therapist called group home facility, per facility but was typically able to t/f with assist x1 to w/c, self propel w/c through facility, is able to complete self feeding with use of AE but elsewise is dependent for all other adls. Today she demonstrates contractures in digits of B hands with hemiparesis noted in R UE. She produces sounds throughout session but is not able to produce words. She required Max Ax2 for all bed mobility. She was found incontinent of large stool and was Dep for all aspects of toileting. She was required Min-Mod A to maintain static sitting balance at EOB but only tolerate approx 5 minutes of activity. Returned to [facility] at end of session. Pt would benefit from skilled OT service but unsure of her ability to actively participate or retention of therapeutic skills/carryover. Will complete a short trial of therapy. Recommend d/c to SNF.     Time Calculation:    Time Calculation- OT     Row Name 05/10/23 2868              "Time Calculation- OT    OT Start Time 1423  add 10 minutes for chart review  -      OT Stop Time 1510  -      OT Time Calculation (min) 47 min  -      OT Received On 05/10/23  -      OT Goal Re-Cert Due Date 23  -            User Key  (r) = Recorded By, (t) = Taken By, (c) = Cosigned By    Initials Name Provider Type     Jessenia Alexandre OTDAHIANA/L, CSRS Occupational Therapist              Therapy Charges for Today     Code Description Service Date Service Provider Modifiers Qty    37679008934 HC OT EVAL MOD COMPLEXITY 4 5/10/2023 Jessenia Alexandre OTR/L, CSRS GO 1               COLT Peterson/L, CSRS  5/10/2023    Electronically signed by Jessenia Alexandre OTR/L, CSRS at 05/10/23 1530          Speech Language Pathology Notes (most recent note)      Yamile Heredia CCC-SLP at 23 1005          Acute Care - Speech Language Pathology   Swallow Treatment Note/Discharge  [place]     Patient Name: Jess Rasmey  : 1950  MRN: [id number]  Today's Date: 2023               Admit Date: 2023    SLP dysphagia tx completed this AM, completed as co-tx with OT student. Pt with L lean, required frequent repositioning. She c/o pain, verbalizing \"hurt\" at times with other verbalizations which were often unintelligible. She nodded and shook head to confirm and occasionally oppose SLP offerings of PO trials for observation of PO diet toleration. She consumed a 1x pureed trial with mildly reduced, yet functional labial seal on spoon, appearingly functional oral transit with this consistency. She then consumed multiple trials of thin liquids via straw, mild delay in bolus extraction time, felt secondary to generalized labial weakness. Minimally audible swallow, occasional mild oral hold prior to bolus transit. No overt s/s of aspiration were observed. Educated RN, Lisbeth, and OT student on SLP assessment of toleration. Given performance and belief that current diet is most appropriate " baseline diet given cognitive impairment and chronic oral motor weakness, continued SLP services are not warranted at this time. Cannot fully r/o aspiration.   RECS:   • Continue pureed diet consistency with regular/thin liquid consistency  • 1:1 feeds with staff  • Reposition to upright consistency as needed throughout meals due to frequent instances of poor positioning  • RN to monitor for increased lung congestion  • Meds crushed (if safe to be crushed) in pudding/applesauce. MD to re-consult if changes or new concerns arise, as SLP is signing off.   Yamile Heredia, CCC-SLP 5/11/2023 10:50 CDT    Visit Dx:    ICD-10-CM ICD-9-CM   1. Acute blood loss anemia  D62 285.1   2. Hypovolemic shock  R57.1 785.59   3. Oral phase dysphagia  R13.11 787.21   4. Acute gastric ulcer with hemorrhage  K25.0 531.00   5. Impaired mobility and ADLs  Z74.09 V49.89    Z78.9    6. Impaired mobility  Z74.09 799.89     Patient Active Problem List   Diagnosis   • Septic shock   • Right lower lobe pneumonia   • Intellectual disability   • Anemia   • Thrombocytopenia   • Transaminitis   • Hypoxia   • Metabolic encephalopathy   • Acute blood loss anemia   • Hypovolemic shock     Past Medical History:   Diagnosis Date   • Gastritis    • Hypertension    • Intellectual disability    • Psychosis    • Right hemiparesis    • Stroke      Past Surgical History:   Procedure Laterality Date   • ENDOSCOPY N/A 5/6/2023    Procedure: ESOPHAGOGASTRODUODENOSCOPY WITH ANESTHESIA;  Surgeon: Sherif Villegas MD;  Location: [facility] OR;  Service: Gastroenterology;  Laterality: N/A;  PRE GI BLEED  POST gastric ulcers,ink and clip   • ENDOSCOPY N/A 5/9/2023    Procedure: ESOPHAGOGASTRODUODENOSCOPY WITH ANESTHESIA;  Surgeon: Sherif Villegas MD;  Location: [facility] ENDOSCOPY;  Service: Gastroenterology;  Laterality: N/A;  Pre: Anemia  Post: Gastric ulcers  David Frazier MD   • HIP SURGERY Right 2019       SLP Recommendation and Plan                   "  Anticipated Discharge Disposition (SLP): skilled nursing facility (05/11/23 1048)                    Anticipated Discharge Disposition (SLP): skilled nursing facility (05/11/23 1048)           Reason for Discharge: all goals and outcomes met, no further needs identified (05/11/23 1048)                Plan of Care Reviewed With: patient, other (see comments) (OT student, Juan Pablo, and RN, Lisbeth) (05/11/23 1005)  Progress: improving (05/11/23 1005)  Outcome Evaluation: SLP dysphagia tx completed this AM, completed as co-tx with OT student. Pt with L lean, required frequent repositioning. She c/o pain, verbalizing \"hurt\" at times with other verbalizations which were often unintelligible. She nodded and shook head to confirm and occasionally oppose SLP offerings of PO trials for observation of PO diet toleration. She consumed a 1x pureed trial with mildly reduced, yet functional labial seal on spoon, appearingly functional oral transit with this consistency. She then consumed multiple trials of thin liquids via straw, mild delay in bolus extraction time, felt secondary to generalized labial weakness. Minimally audible swallow, occasional mild oral hold prior to bolus transit. No overt s/s of aspiration were observed. Educated RN, Lisbeth, and OT student on SLP assessment of toleration. Given performance and belief that current diet is most appropriate baseline diet given cognitive impairment and chronic oral motor weakness, continued SLP services are not warranted at this time. Cannot fully r/o aspiration. RECS: Continue pureed diet consistency with regular/thin liquid consistency; 1:1 feeds with staff; Reposition to upright consistency as needed throughout meals due to frequent instances of poor positioning; RN to monitor for increased lung congestion; Meds crushed (if safe to be crushed) in pudding/applesauce. MD to re-consult if changes or new concerns arise, as SLP is signing off. Thanks! (05/11/23 1005)    SWALLOW " EVALUATION (last 72 hours)     SLP Adult Swallow Evaluation     Row Name 05/11/23 1005 05/10/23 0926 05/09/23 1342             Rehab Evaluation    Document Type therapy note (daily note)  -TM therapy note (daily note)  -MB therapy note (daily note)  -BN      Subjective Information complains of;pain  -TM --  Difficulty with verbal expression  -MB no complaints  -BN      Patient Observations alert  Distracted, cooperative with cues and encouragement  -TM alert;cooperative  -MB alert;cooperative  -BN      Patient/Family/Caregiver Comments/Observations OT student, Juan Pablo, present for co-tx.  -TM No family present  -MB no family present  -BN      Patient Effort adequate  -TM adequate  -MB good  -BN         Pain    Additional Documentation Pain Scale: FACES Pre/Post-Treatment (Group)  -TM Pain Scale: FACES Pre/Post-Treatment (Group)  -MB --         Pain Scale: FACES Pre/Post-Treatment    Pain: FACES Scale, Pretreatment 4-->hurts little more  -TM 0-->no hurt  -MB 0-->no hurt  -BN      Posttreatment Pain Rating 4-->hurts little more  -TM -- 0-->no hurt  -BN         SLP Treatment Clinical Impressions    Treatment Assessment (SLP) -- continued  -MB --      Treatment Assessment Comments (SLP) -- Continue to follow  -MB --      Daily Summary of Progress (SLP) -- progress towards functional goals is fair  -MB progress toward functional goals is good  -BN      Barriers to Overall Progress (SLP) -- Cognitive status  -MB Cognitive status  -      Plan for Continued Treatment (SLP) -- continue treatment per plan of care  -MB continue treatment per plan of care  -BN         Recommendations    SLP Diet Recommendation -- -- puree;thin liquids  -BN         Swallow Goals (SLP)    Swallow LTGs -- -- Swallow Long Term Goal (free text)  -BN      Swallow STGs -- -- diet tolerance goal selection (SLP)  -BN      Diet Tolerance Goal Selection (SLP) -- -- Patient will tolerate trials of  -BN         (LTG) Swallow    (LTG) Swallow Patient will  tolerate least restrictive diet without s/s of aspiration.  -TM Patient will tolerate least restrictive diet without s/s of aspiration.  -MB Patient will tolerate least restrictive diet without s/s of aspiration.  -BN      [place] (Swallow Long Term Goal) independently (over 90% accuracy)  -TM independently (over 90% accuracy)  -MB independently (over 90% accuracy)  -BN      Time Frame (Swallow Long Term Goal) by discharge  -TM by discharge  -MB by discharge  -BN      Barriers (Swallow Long Term Goal) none  -TM none  -MB none  -BN      Progress/Outcomes (Swallow Long Term Goal) goal met;goal no longer appropriate  -TM continuing progress toward goal  -MB continuing progress toward goal  -BN         (STG) Patient will tolerate trials of    Consistencies Trialed (Tolerate trials) pureed textures;thin liquids  -TM pureed textures;thin liquids  -MB pureed textures;thin liquids  -BN      Desired Outcome (Tolerate trials) without signs/symptoms of aspiration;without signs of distress;with adequate oral prep/transit/clearance;with use of compensatory strategies (see comments)  -TM without signs/symptoms of aspiration;without signs of distress;with adequate oral prep/transit/clearance;with use of compensatory strategies (see comments)  -MB without signs/symptoms of aspiration;without signs of distress;with adequate oral prep/transit/clearance;with use of compensatory strategies (see comments)  -BN      [place] (Tolerate trials) independently (over 90% accuracy)  -TM independently (over 90% accuracy)  -MB independently (over 90% accuracy)  -BN      Time Frame (Tolerate trials) by discharge  -TM by discharge  -MB by discharge  -BN      Progress/Outcomes (Tolerate trials) goal met;goal no longer appropriate  -TM continuing progress toward goal  -MB continuing progress toward goal  -BN            User Key  (r) = Recorded By, (t) = Taken By, (c) = Cosigned By    Initials Name Effective Dates    Nuvia Hernandez  CCC-SLP 02/03/23 -     TM Yamile Heredia CCC-SLP 02/03/23 -     BN Bruna Hooks, MS-CCC/SLP, CNT 06/15/22 -                 EDUCATION  The patient has been educated in the following areas:   Dysphagia (Swallowing Impairment).         SLP GOALS     Row Name 05/11/23 1005 05/10/23 0926 05/09/23 1349       (LTG) Swallow    (LTG) Swallow Patient will tolerate least restrictive diet without s/s of aspiration.  -TM Patient will tolerate least restrictive diet without s/s of aspiration.  -MB Patient will tolerate least restrictive diet without s/s of aspiration.  -BN    [place] (Swallow Long Term Goal) independently (over 90% accuracy)  -TM independently (over 90% accuracy)  -MB independently (over 90% accuracy)  -BN    Time Frame (Swallow Long Term Goal) by discharge  -TM by discharge  -MB by discharge  -BN    Barriers (Swallow Long Term Goal) none  -TM none  -MB none  -BN    Progress/Outcomes (Swallow Long Term Goal) goal met;goal no longer appropriate  -TM continuing progress toward goal  -MB continuing progress toward goal  -BN       (STG) Patient will tolerate trials of    Consistencies Trialed (Tolerate trials) pureed textures;thin liquids  -TM pureed textures;thin liquids  -MB pureed textures;thin liquids  -BN    Desired Outcome (Tolerate trials) without signs/symptoms of aspiration;without signs of distress;with adequate oral prep/transit/clearance;with use of compensatory strategies (see comments)  -TM without signs/symptoms of aspiration;without signs of distress;with adequate oral prep/transit/clearance;with use of compensatory strategies (see comments)  -MB without signs/symptoms of aspiration;without signs of distress;with adequate oral prep/transit/clearance;with use of compensatory strategies (see comments)  -BN    [place] (Tolerate trials) independently (over 90% accuracy)  -TM independently (over 90% accuracy)  -MB independently (over 90% accuracy)  -BN    Time Frame (Tolerate trials)  by discharge  -TM by discharge  -MB by discharge  -BN    Progress/Outcomes (Tolerate trials) goal met;goal no longer appropriate  -TM continuing progress toward goal  -MB continuing progress toward goal  -BN          User Key  (r) = Recorded By, (t) = Taken By, (c) = Cosigned By    Initials Name Provider Type    Nuvia Hernandez, CCC-SLP Speech and Language Pathologist    Yamile Cloud CCC-SLP Speech and Language Pathologist    Bruna Remy, MS-CCC/SLP, [facility] Speech and Language Pathologist                     Time Calculation:    Time Calculation- SLP     Row Name 05/11/23 1049             Time Calculation- SLP    SLP Start Time 1005  -TM      SLP Stop Time 1035  -TM      SLP Time Calculation (min) 30 min  -TM      SLP Received On 05/11/23  -TM         Untimed Charges    23432-AJ Treatment Swallow Minutes 30  -TM         Total Minutes    Untimed Charges Total Minutes 30  -TM       Total Minutes 30  -TM            User Key  (r) = Recorded By, (t) = Taken By, (c) = Cosigned By    Initials Name Provider Type     Yamile Heredia CCC-SLP Speech and Language Pathologist                Therapy Charges for Today     Code Description Service Date Service Provider Modifiers Qty    13403085509 HC ST TREATMENT SWALLOW 2 5/11/2023 Yamile Heredia CCC-SLP GN 1               SLP Discharge Summary  Anticipated Discharge Disposition (SLP): skilled nursing facility  Reason for Discharge: all goals and outcomes met, no further needs identified  Progress Toward Achieving Short/long Term Goals: all goals met within established timelines  Discharge Destination: SNF    SD Ramires  5/11/2023    Electronically signed by Yamile Heredia CCC-SLP at 05/11/23 1050       Respiratory Therapy Notes (most recent note)    No notes exist for this encounter.

## 2023-05-11 NOTE — CASE MANAGEMENT/SOCIAL WORK
Continued Stay Note  [facility]     Patient Name: Jess Ramsey  MRN: [id number]  Today's Date: 5/11/2023    Admit Date: 5/5/2023    Plan: SNF   Discharge Plan     Row Name 05/11/23 1449       Plan    Plan SNF    Plan Comments SW has left messages for [facility], [place] and [facility] to check on referral status.               Discharge Codes    No documentation.               Expected Discharge Date and Time     Expected Discharge Date Expected Discharge Time    May 11, 2023             JACKSON HairstonW

## 2023-05-11 NOTE — THERAPY TREATMENT NOTE
Acute Care - Occupational Therapy Treatment Note  [facility]     Patient Name: Jess Ramsey  : 1950  MRN: [id number]  Today's Date: 2023             Admit Date: 2023       ICD-10-CM ICD-9-CM   1. Acute blood loss anemia  D62 285.1   2. Hypovolemic shock  R57.1 785.59   3. Oral phase dysphagia  R13.11 787.21   4. Acute gastric ulcer with hemorrhage  K25.0 531.00   5. Impaired mobility and ADLs  Z74.09 V49.89    Z78.9    6. Impaired mobility  Z74.09 799.89     Patient Active Problem List   Diagnosis   • Septic shock   • Right lower lobe pneumonia   • Intellectual disability   • Anemia   • Thrombocytopenia   • Transaminitis   • Hypoxia   • Metabolic encephalopathy   • Acute blood loss anemia   • Hypovolemic shock     Past Medical History:   Diagnosis Date   • Gastritis    • Hypertension    • Intellectual disability    • Psychosis    • Right hemiparesis    • Stroke      Past Surgical History:   Procedure Laterality Date   • ENDOSCOPY N/A 2023    Procedure: ESOPHAGOGASTRODUODENOSCOPY WITH ANESTHESIA;  Surgeon: Sherif Villegas MD;  Location: [facility] OR;  Service: Gastroenterology;  Laterality: N/A;  PRE GI BLEED  POST gastric ulcers,ink and clip   • ENDOSCOPY N/A 2023    Procedure: ESOPHAGOGASTRODUODENOSCOPY WITH ANESTHESIA;  Surgeon: Sherif Villegas MD;  Location: [facility] ENDOSCOPY;  Service: Gastroenterology;  Laterality: N/A;  Pre: Anemia  Post: Gastric ulcers  David Frazier MD   • HIP SURGERY Right 2019         OT ASSESSMENT FLOWSHEET (last 12 hours)     OT Evaluation and Treatment     Row Name 23 0938                   OT Time and Intention    Subjective Information complains of;weakness (P)   -GK        Document Type therapy note (daily note) (P)   -        Mode of Treatment occupational therapy (P)   -GK        Patient Effort adequate (P)   -GK           General Information    Patient Profile Reviewed yes (P)   -GK        Existing Precautions/Restrictions fall;oxygen therapy  device and L/min (P)   -GK        Barriers to Rehab cognitive status;medically complex;previous functional deficit;language barrier (P)   -GK           Wound 05/05/23 2107 gluteal    Wound - Properties Group Placement Date: 05/05/23 -SY Placement Time: 2107 -SY Location: gluteal  -SY    Retired Wound - Properties Group Placement Date: 05/05/23 -SY Placement Time: 2107 -SY Location: gluteal  -SY    Retired Wound - Properties Group Date first assessed: 05/05/23 -SY Time first assessed: 2107 -SY Location: gluteal  -SY       Coping    Observed Emotional State cooperative;frustrated (P)   -GK        Verbalized Emotional State acceptance (P)   -GK           Plan of Care Review    Plan of Care Reviewed With patient (P)   -GK        Progress no change (P)   -GK        Outcome Evaluation OT tx completed. Pt in fowlers in bed and on 2 ½L/NC. Pt non verbal and communicates with head nods. Pt tolerated BUE PROM exercises of shoulder flex/ext, elbow flex/ext, wrist flex/ext, and digit flex/ext 1 set x 10 reps to increase BUE strength for ADL tasks. Pt presents with flaccid digits and with contractures. Pt verbalized pain with passive digit flex/ext. Pt was resistive during exercises as well. Pt completed grooming tasks in bed of washing face requiring Dep A d/t BUE flaccidity and weakness, and donning chapstick and deodorant requiring Dep A d/t BUE weakness. Pt unable to follow commands well and requires Mod verbal/tactile cues to stay on task. Pt completed LB dressing of donning socks requiring Dep A d/t weakness and flaccidity in BUE. SNF placement recommended upon discharge. OT POC to continue. (P)   -GK           Positioning and Restraints    Pre-Treatment Position in bed (P)   -GK        Post Treatment Position bed (P)   -GK        In Bed notified nsg;fowlers;call light within reach;encouraged to call for assist;exit alarm on (P)   -GK           Therapy Plan Review/Discharge Plan (OT)    Anticipated Discharge  Disposition (OT) skilled nursing facility (P)   -              User Key  (r) = Recorded By, (t) = Taken By, (c) = Cosigned By    Initials Name Effective Dates    SY Clemente Harris RN 11/22/22 -      Juan Pablo Romano OTA Student 02/20/23 -                  Occupational Therapy Education     Title: PT OT SLP Therapies (In Progress)     Topic: Occupational Therapy (In Progress)     Point: ADL training (Done)     Description:   Instruct learner(s) on proper safety adaptation and remediation techniques during self care or transfers.   Instruct in proper use of assistive devices.              Learning Progress Summary           Patient Acceptance, E, VU,NR by  at 5/11/2023 1043    Comment: Pt educated on safety during bed mobility    Acceptance, E, NR,NL by  at 5/10/2023 1528                   Point: Home exercise program (Not Started)     Description:   Instruct learner(s) on appropriate technique for monitoring, assisting and/or progressing therapeutic exercises/activities.              Learner Progress:  Not documented in this visit.          Point: Precautions (In Progress)     Description:   Instruct learner(s) on prescribed precautions during self-care and functional transfers.              Learning Progress Summary           Patient Acceptance, E, NR,NL by ЮЛИЯ at 5/10/2023 1528                   Point: Body mechanics (Not Started)     Description:   Instruct learner(s) on proper positioning and spine alignment during self-care, functional mobility activities and/or exercises.              Learner Progress:  Not documented in this visit.                      User Key     Initials Effective Dates Name Provider Type Discipline     11/10/21 -  Jessenia Alexandre OTR/L, CSRS Occupational Therapist OT     02/20/23 -  Juan Pablo Romano OTA Student OT Student OT                  OT Recommendation and Plan     Plan of Care Review  Plan of Care Reviewed With: (P) patient  Progress: (P) no change  Outcome Evaluation: (P)  OT tx completed. Pt in fowlers in bed and on 2 ½L/NC. Pt non verbal and communicates with head nods. Pt tolerated BUE PROM exercises of shoulder flex/ext, elbow flex/ext, wrist flex/ext, and digit flex/ext 1 set x 10 reps to increase BUE strength for ADL tasks. Pt presents with flaccid digits and with contractures. Pt verbalized pain with passive digit flex/ext. Pt was resistive during exercises as well. Pt completed grooming tasks in bed of washing face requiring Dep A d/t BUE flaccidity and weakness, and donning chapstick and deodorant requiring Dep A d/t BUE weakness. Pt unable to follow commands well and requires Mod verbal/tactile cues to stay on task. Pt completed LB dressing of donning socks requiring Dep A d/t weakness and flaccidity in BUE. SNF placement recommended upon discharge. OT POC to continue.  Plan of Care Reviewed With: (P) patient  Outcome Evaluation: (P) OT tx completed. Pt in fowlers in bed and on 2 ½L/NC. Pt non verbal and communicates with head nods. Pt tolerated BUE PROM exercises of shoulder flex/ext, elbow flex/ext, wrist flex/ext, and digit flex/ext 1 set x 10 reps to increase BUE strength for ADL tasks. Pt presents with flaccid digits and with contractures. Pt verbalized pain with passive digit flex/ext. Pt was resistive during exercises as well. Pt completed grooming tasks in bed of washing face requiring Dep A d/t BUE flaccidity and weakness, and donning chapstick and deodorant requiring Dep A d/t BUE weakness. Pt unable to follow commands well and requires Mod verbal/tactile cues to stay on task. Pt completed LB dressing of donning socks requiring Dep A d/t weakness and flaccidity in BUE. SNF placement recommended upon discharge. OT POC to continue.     Outcome Measures     Row Name 05/11/23 1000             How much help from another is currently needed...    Putting on and taking off regular lower body clothing? 1 (P)   -      Bathing (including washing, rinsing, and drying) 1 (P)    -GK      Toileting (which includes using toilet bed pan or urinal) 1 (P)   -GK      Putting on and taking off regular upper body clothing 1 (P)   -GK      Taking care of personal grooming (such as brushing teeth) 1 (P)   -GK      Eating meals 1 (P)   -GK      AM-PAC 6 Clicks Score (OT) 6 (P)   -GK         Functional Assessment    Outcome Measure Options AM-PAC 6 Clicks Daily Activity (OT) (P)   -GK            User Key  (r) = Recorded By, (t) = Taken By, (c) = Cosigned By    Initials Name Provider Type    Juan Pablo Ni OTA Student OT Student                Time Calculation:    Time Calculation- OT     Row Name 05/11/23 1056             Time Calculation- OT    OT Start Time 0938  -      OT Stop Time 1046  -      OT Time Calculation (min) 68 min  -      Total Timed Code Minutes- OT 68 minute(s)  -      OT Received On 05/11/23  -            User Key  (r) = Recorded By, (t) = Taken By, (c) = Cosigned By    Initials Name Provider Type     Ana M Velarde COTA Occupational Therapist Assistant                       FANNY Jewell Student  5/11/2023

## 2023-05-11 NOTE — THERAPY DISCHARGE NOTE
"Acute Care - Speech Language Pathology   Swallow Treatment Note/Discharge [facility]     Patient Name: Jess Ramsey  : 1950  MRN: [id number]  Today's Date: 2023               Admit Date: 2023    SLP dysphagia tx completed this AM, completed as co-tx with OT student. Pt with L lean, required frequent repositioning. She c/o pain, verbalizing \"hurt\" at times with other verbalizations which were often unintelligible. She nodded and shook head to confirm and occasionally oppose SLP offerings of PO trials for observation of PO diet toleration. She consumed a 1x pureed trial with mildly reduced, yet functional labial seal on spoon, appearingly functional oral transit with this consistency. She then consumed multiple trials of thin liquids via straw, mild delay in bolus extraction time, felt secondary to generalized labial weakness. Minimally audible swallow, occasional mild oral hold prior to bolus transit. No overt s/s of aspiration were observed. Educated RN, Lisbeth, and OT student on SLP assessment of toleration. Given performance and belief that current diet is most appropriate baseline diet given cognitive impairment and chronic oral motor weakness, continued SLP services are not warranted at this time. Cannot fully r/o aspiration.   RECS:   • Continue pureed diet consistency with regular/thin liquid consistency  • 1:1 feeds with staff  • Reposition to upright consistency as needed throughout meals due to frequent instances of poor positioning  • RN to monitor for increased lung congestion  • Meds crushed (if safe to be crushed) in pudding/applesauce. MD to re-consult if changes or new concerns arise, as SLP is signing off.   Yamile Heredia CCC-SLP 2023 10:50 CDT    Visit Dx:    ICD-10-CM ICD-9-CM   1. Acute blood loss anemia  D62 285.1   2. Hypovolemic shock  R57.1 785.59   3. Oral phase dysphagia  R13.11 787.21   4. Acute gastric ulcer with hemorrhage  K25.0 531.00   5. Impaired mobility and " "ADLs  Z74.09 V49.89    Z78.9    6. Impaired mobility  Z74.09 799.89     Patient Active Problem List   Diagnosis   • Septic shock   • Right lower lobe pneumonia   • Intellectual disability   • Anemia   • Thrombocytopenia   • Transaminitis   • Hypoxia   • Metabolic encephalopathy   • Acute blood loss anemia   • Hypovolemic shock     Past Medical History:   Diagnosis Date   • Gastritis    • Hypertension    • Intellectual disability    • Psychosis    • Right hemiparesis    • Stroke      Past Surgical History:   Procedure Laterality Date   • ENDOSCOPY N/A 5/6/2023    Procedure: ESOPHAGOGASTRODUODENOSCOPY WITH ANESTHESIA;  Surgeon: Sherif Villegas MD;  Location: [facility] OR;  Service: Gastroenterology;  Laterality: N/A;  PRE GI BLEED  POST gastric ulcers,ink and clip   • ENDOSCOPY N/A 5/9/2023    Procedure: ESOPHAGOGASTRODUODENOSCOPY WITH ANESTHESIA;  Surgeon: Sherif Villegas MD;  Location: [facility] ENDOSCOPY;  Service: Gastroenterology;  Laterality: N/A;  Pre: Anemia  Post: Gastric ulcers  David Frazier MD   • HIP SURGERY Right 2019       SLP Recommendation and Plan                    Anticipated Discharge Disposition (SLP): skilled nursing facility (05/11/23 1048)                    Anticipated Discharge Disposition (SLP): skilled nursing facility (05/11/23 1048)           Reason for Discharge: all goals and outcomes met, no further needs identified (05/11/23 1048)                Plan of Care Reviewed With: patient, other (see comments) (OT student, Juan Pablo, and RN, Lisbeth) (05/11/23 1005)  Progress: improving (05/11/23 1005)  Outcome Evaluation: SLP dysphagia tx completed this AM, completed as co-tx with OT student. Pt with L lean, required frequent repositioning. She c/o pain, verbalizing \"hurt\" at times with other verbalizations which were often unintelligible. She nodded and shook head to confirm and occasionally oppose SLP offerings of PO trials for observation of PO diet toleration. She consumed a 1x pureed trial " with mildly reduced, yet functional labial seal on spoon, appearingly functional oral transit with this consistency. She then consumed multiple trials of thin liquids via straw, mild delay in bolus extraction time, felt secondary to generalized labial weakness. Minimally audible swallow, occasional mild oral hold prior to bolus transit. No overt s/s of aspiration were observed. Educated RN, Lisbeth, and OT student on SLP assessment of toleration. Given performance and belief that current diet is most appropriate baseline diet given cognitive impairment and chronic oral motor weakness, continued SLP services are not warranted at this time. Cannot fully r/o aspiration. RECS: Continue pureed diet consistency with regular/thin liquid consistency; 1:1 feeds with staff; Reposition to upright consistency as needed throughout meals due to frequent instances of poor positioning; RN to monitor for increased lung congestion; Meds crushed (if safe to be crushed) in pudding/applesauce. MD to re-consult if changes or new concerns arise, as SLP is signing off. Thanks! (05/11/23 1005)    SWALLOW EVALUATION (last 72 hours)     SLP Adult Swallow Evaluation     Row Name 05/11/23 1005 05/10/23 0926 05/09/23 1349             Rehab Evaluation    Document Type therapy note (daily note)  -TM therapy note (daily note)  -MB therapy note (daily note)  -BN      Subjective Information complains of;pain  -TM --  Difficulty with verbal expression  -MB no complaints  -BN      Patient Observations alert  Distracted, cooperative with cues and encouragement  -TM alert;cooperative  -MB alert;cooperative  -BN      Patient/Family/Caregiver Comments/Observations OT student, Juan Pablo, present for co-tx.  -TM No family present  -MB no family present  -BN      Patient Effort adequate  -TM adequate  -MB good  -BN         Pain    Additional Documentation Pain Scale: FACES Pre/Post-Treatment (Group)  -TM Pain Scale: FACES Pre/Post-Treatment (Group)  -MB --          Pain Scale: FACES Pre/Post-Treatment    Pain: FACES Scale, Pretreatment 4-->hurts little more  -TM 0-->no hurt  -MB 0-->no hurt  -BN      Posttreatment Pain Rating 4-->hurts little more  -TM -- 0-->no hurt  -BN         SLP Treatment Clinical Impressions    Treatment Assessment (SLP) -- continued  -MB --      Treatment Assessment Comments (SLP) -- Continue to follow  -MB --      Daily Summary of Progress (SLP) -- progress towards functional goals is fair  -MB progress toward functional goals is good  -BN      Barriers to Overall Progress (SLP) -- Cognitive status  -MB Cognitive status  -BN      Plan for Continued Treatment (SLP) -- continue treatment per plan of care  -MB continue treatment per plan of care  -BN         Recommendations    SLP Diet Recommendation -- -- puree;thin liquids  -BN         Swallow Goals (SLP)    Swallow LTGs -- -- Swallow Long Term Goal (free text)  -BN      Swallow STGs -- -- diet tolerance goal selection (SLP)  -BN      Diet Tolerance Goal Selection (SLP) -- -- Patient will tolerate trials of  -BN         (LTG) Swallow    (LTG) Swallow Patient will tolerate least restrictive diet without s/s of aspiration.  -TM Patient will tolerate least restrictive diet without s/s of aspiration.  -MB Patient will tolerate least restrictive diet without s/s of aspiration.  -BN      [place] (Swallow Long Term Goal) independently (over 90% accuracy)  -TM independently (over 90% accuracy)  -MB independently (over 90% accuracy)  -BN      Time Frame (Swallow Long Term Goal) by discharge  -TM by discharge  -MB by discharge  -BN      Barriers (Swallow Long Term Goal) none  -TM none  -MB none  -BN      Progress/Outcomes (Swallow Long Term Goal) goal met;goal no longer appropriate  -TM continuing progress toward goal  -MB continuing progress toward goal  -BN         (STG) Patient will tolerate trials of    Consistencies Trialed (Tolerate trials) pureed textures;thin liquids  -TM pureed textures;thin liquids   -MB pureed textures;thin liquids  -BN      Desired Outcome (Tolerate trials) without signs/symptoms of aspiration;without signs of distress;with adequate oral prep/transit/clearance;with use of compensatory strategies (see comments)  -TM without signs/symptoms of aspiration;without signs of distress;with adequate oral prep/transit/clearance;with use of compensatory strategies (see comments)  -MB without signs/symptoms of aspiration;without signs of distress;with adequate oral prep/transit/clearance;with use of compensatory strategies (see comments)  -BN      [place] (Tolerate trials) independently (over 90% accuracy)  -TM independently (over 90% accuracy)  -MB independently (over 90% accuracy)  -BN      Time Frame (Tolerate trials) by discharge  -TM by discharge  -MB by discharge  -BN      Progress/Outcomes (Tolerate trials) goal met;goal no longer appropriate  -TM continuing progress toward goal  -MB continuing progress toward goal  -BN            User Key  (r) = Recorded By, (t) = Taken By, (c) = Cosigned By    Initials Name Effective Dates    Nuvia Hernandez, CCC-SLP 02/03/23 -     Yamile Cloud CCC-SLP 02/03/23 -     Bruna Remy, MS-CCC/SLP, [facility] 06/15/22 -                 EDUCATION  The patient has been educated in the following areas:   Dysphagia (Swallowing Impairment).         SLP GOALS     Row Name 05/11/23 1005 05/10/23 0926 05/09/23 1349       (LTG) Swallow    (LTG) Swallow Patient will tolerate least restrictive diet without s/s of aspiration.  -TM Patient will tolerate least restrictive diet without s/s of aspiration.  -MB Patient will tolerate least restrictive diet without s/s of aspiration.  -BN    [place] (Swallow Long Term Goal) independently (over 90% accuracy)  -TM independently (over 90% accuracy)  -MB independently (over 90% accuracy)  -BN    Time Frame (Swallow Long Term Goal) by discharge  -TM by discharge  -MB by discharge  -BN    Barriers (Swallow Long  Term Goal) none  -TM none  -MB none  -BN    Progress/Outcomes (Swallow Long Term Goal) goal met;goal no longer appropriate  -TM continuing progress toward goal  -MB continuing progress toward goal  -BN       (STG) Patient will tolerate trials of    Consistencies Trialed (Tolerate trials) pureed textures;thin liquids  -TM pureed textures;thin liquids  -MB pureed textures;thin liquids  -BN    Desired Outcome (Tolerate trials) without signs/symptoms of aspiration;without signs of distress;with adequate oral prep/transit/clearance;with use of compensatory strategies (see comments)  -TM without signs/symptoms of aspiration;without signs of distress;with adequate oral prep/transit/clearance;with use of compensatory strategies (see comments)  -MB without signs/symptoms of aspiration;without signs of distress;with adequate oral prep/transit/clearance;with use of compensatory strategies (see comments)  -BN    [employer] (Tolerate trials) independently (over 90% accuracy)  -TM independently (over 90% accuracy)  -MB independently (over 90% accuracy)  -BN    Time Frame (Tolerate trials) by discharge  -TM by discharge  -MB by discharge  -BN    Progress/Outcomes (Tolerate trials) goal met;goal no longer appropriate  -TM continuing progress toward goal  -MB continuing progress toward goal  -BN          User Key  (r) = Recorded By, (t) = Taken By, (c) = Cosigned By    Initials Name Provider Type    Nuvia Hernandez CCC-SLP Speech and Language Pathologist    Yamile Cloud CCC-SLP Speech and Language Pathologist    Bruna Remy, MS-CCC/SLP, [facility] Speech and Language Pathologist                     Time Calculation:    Time Calculation- SLP     Row Name 05/11/23 1049             Time Calculation- SLP    SLP Start Time 1005  -TM      SLP Stop Time 1035  -TM      SLP Time Calculation (min) 30 min  -TM      SLP Received On 05/11/23  -TM         Untimed Charges    39159-YE Treatment Swallow Minutes 30  -TM          Total Minutes    Untimed Charges Total Minutes 30  -TM       Total Minutes 30  -TM            User Key  (r) = Recorded By, (t) = Taken By, (c) = Cosigned By    Initials Name Provider Type    TM Yamile Heredia CCC-SLP Speech and Language Pathologist                Therapy Charges for Today     Code Description Service Date Service Provider Modifiers Qty    69295680201 HC ST TREATMENT SWALLOW 2 5/11/2023 Yamile Heredia CCC-SLP GN 1               SLP Discharge Summary  Anticipated Discharge Disposition (SLP): skilled nursing facility  Reason for Discharge: all goals and outcomes met, no further needs identified  Progress Toward Achieving Short/long Term Goals: all goals met within established timelines  Discharge Destination: SNF    SD Ramires  5/11/2023

## 2023-05-11 NOTE — THERAPY TREATMENT NOTE
Acute Care - Physical Therapy Treatment Note  [facility]     Patient Name: Jess Ramsey  : 1950  MRN: [id number]  Today's Date: 2023      Visit Dx:     ICD-10-CM ICD-9-CM   1. Acute blood loss anemia  D62 285.1   2. Hypovolemic shock  R57.1 785.59   3. Oral phase dysphagia  R13.11 787.21   4. Acute gastric ulcer with hemorrhage  K25.0 531.00   5. Impaired mobility and ADLs  Z74.09 V49.89    Z78.9    6. Impaired mobility  Z74.09 799.89     Patient Active Problem List   Diagnosis   • Septic shock   • Right lower lobe pneumonia   • Intellectual disability   • Anemia   • Thrombocytopenia   • Transaminitis   • Hypoxia   • Metabolic encephalopathy   • Acute blood loss anemia   • Hypovolemic shock     Past Medical History:   Diagnosis Date   • Gastritis    • Hypertension    • Intellectual disability    • Psychosis    • Right hemiparesis    • Stroke      Past Surgical History:   Procedure Laterality Date   • ENDOSCOPY N/A 2023    Procedure: ESOPHAGOGASTRODUODENOSCOPY WITH ANESTHESIA;  Surgeon: Sherif Villegas MD;  Location: [facility] OR;  Service: Gastroenterology;  Laterality: N/A;  PRE GI BLEED  POST gastric ulcers,ink and clip   • ENDOSCOPY N/A 2023    Procedure: ESOPHAGOGASTRODUODENOSCOPY WITH ANESTHESIA;  Surgeon: Sherif Villegas MD;  Location: [facility] ENDOSCOPY;  Service: Gastroenterology;  Laterality: N/A;  Pre: Anemia  Post: Gastric ulcers  David Frazier MD   • HIP SURGERY Right 2019     PT Assessment (last 12 hours)     PT Evaluation and Treatment     Row Name 23 1423          Physical Therapy Time and Intention    Document Type therapy note (daily note)  -TB     Mode of Treatment physical therapy  -TB     Row Name 23 1423          General Information    Existing Precautions/Restrictions fall;oxygen therapy device and L/min  -TB     Row Name 23 1423          Bed Mobility    Bed Mobility scooting/bridging  -TB     Scooting/Bridging [place] (Bed Mobility) dependent  "(less than 25% patient effort);2 person assist  -TB     Row Name 05/11/23 1423          Motor Skills    Therapeutic Exercise --  AAROM-PROM BLE x15  -TB     Row Name             Wound 05/05/23 2107 gluteal    Wound - Properties Group Placement Date: 05/05/23 -SY Placement Time: 2107 -SY Location: gluteal  -SY    Retired Wound - Properties Group Placement Date: 05/05/23  -SY Placement Time: 2107 -SY Location: gluteal  -SY    Retired Wound - Properties Group Date first assessed: 05/05/23 -SY Time first assessed: 2107 -SY Location: gluteal  -SY    Row Name 05/11/23 1423          Plan of Care Review    Plan of Care Reviewed With patient  -TB     Outcome Evaluation Pt in bed yelling out but unable to understand what it is she wants. After a few mins therapist figured out pt is saying, \"I wanna go to group home.\" Dep x2 to scoot pt up in bed and position. Performed AAROM-PROM BLE x15. Pt only assited w/ 2-3 exercies and the rest was passive. Postioned pt onto her L side. Will cont POC.  -TB     Row Name 05/11/23 1423          Positioning and Restraints    Pre-Treatment Position in bed  -TB     Post Treatment Position bed  -TB     In Bed side lying left;call light within reach;encouraged to call for assist;exit alarm on;side rails up x3  -TB           User Key  (r) = Recorded By, (t) = Taken By, (c) = Cosigned By    Initials Name Provider Type    TB Toni Hoffman, PTA Physical Therapist Assistant    Clemente Caldera, RN Registered Nurse                Physical Therapy Education     Title: PT OT SLP Therapies (In Progress)     Topic: Physical Therapy (In Progress)     Point: Mobility training (In Progress)     Learning Progress Summary           Patient Nonacceptance, E, NL by SB at 5/10/2023 1610    Comment: pt edu on POC, benefits of act and d/c plans                   Point: Home exercise program (Not Started)     Learner Progress:  Not documented in this visit.          Point: Body mechanics (Not Started)  " "   Learner Progress:  Not documented in this visit.          Point: Precautions (In Progress)     Learning Progress Summary           Patient Nonacceptance, E, NL by SB at 5/10/2023 1610    Comment: pt edu on POC, benefits of act and d/c plans                               User Key     Initials Effective Dates Name Provider Type Discipline    JANIS 06/16/21 -  Kala Rodriguez, PT DPT Physical Therapist PT              PT Recommendation and Plan     Plan of Care Reviewed With: patient  Outcome Evaluation: Pt in bed yelling out but unable to understand what it is she wants. After a few mins therapist figured out pt is saying, \"I wanna go to group home.\" Dep x2 to scoot pt up in bed and position. Performed AAROM-PROM BLE x15. Pt only assited w/ 2-3 exercies and the rest was passive. Postioned pt onto her L side. Will cont POC.   Outcome Measures     Row Name 05/11/23 1423 05/11/23 1000          How much help from another person do you currently need...    Turning from your back to your side while in flat bed without using bedrails? 1  -TB --     Moving from lying on back to sitting on the side of a flat bed without bedrails? 1  -TB --     Moving to and from a bed to a chair (including a wheelchair)? 1  -TB --     Standing up from a chair using your arms (e.g., wheelchair, bedside chair)? 1  -TB --     Climbing 3-5 steps with a railing? 1  -TB --     To walk in hospital room? 1  -TB --     AM-PAC 6 Clicks Score (PT) 6  -TB --        How much help from another is currently needed...    Putting on and taking off regular lower body clothing? -- 1  -LS (r) GK (t) LS (c)     Bathing (including washing, rinsing, and drying) -- 1  -LS (r) GK (t) LS (c)     Toileting (which includes using toilet bed pan or urinal) -- 1  -LS (r) GK (t) LS (c)     Putting on and taking off regular upper body clothing -- 1  -LS (r) GK (t) LS (c)     Taking care of personal grooming (such as brushing teeth) -- 1  -LS (r) GK (t) LS (c)     Eating meals " -- 1  -LS (r) GK (t) LS (c)     AM-PAC 6 Clicks Score (OT) -- 6  -LS (r) GK (t)        Functional Assessment    Outcome Measure Options AM-PAC 6 Clicks Basic Mobility (PT)  -TB AM-PAC 6 Clicks Daily Activity (OT)  -LS (r) GK (t) LS (c)           User Key  (r) = Recorded By, (t) = Taken By, (c) = Cosigned By    Initials Name Provider Type    TB Toni Hoffman PTA Physical Therapist Assistant    Ana M Burton COTA Occupational Therapist Assistant    GK Juan Pablo Romano OTA Student OT Student                 Time Calculation:    PT Charges     Row Name 05/11/23 1532             Time Calculation    Start Time 1423  -TB      Stop Time 1446  -TB      Time Calculation (min) 23 min  -TB      PT Received On 05/11/23  -TB         Time Calculation- PT    Total Timed Code Minutes- PT 23 minute(s)  -TB            User Key  (r) = Recorded By, (t) = Taken By, (c) = Cosigned By    Initials Name Provider Type    Toni Narayan PTA Physical Therapist Assistant              Therapy Charges for Today     Code Description Service Date Service Provider Modifiers Qty    57188087116 HC PT THER PROC EA 15 MIN 5/11/2023 Toni Hoffman PTA GP 2          PT G-Codes  Outcome Measure Options: AM-PAC 6 Clicks Basic Mobility (PT)  AM-PAC 6 Clicks Score (PT): 6  AM-PAC 6 Clicks Score (OT): 6    Toni Hoffman PTA  5/11/2023

## 2023-05-11 NOTE — PROGRESS NOTES
[facility] Medicine Services  INPATIENT PROGRESS NOTE    Patient Name: Jess Ramsey  Date of Admission: 5/5/2023  Today's Date: 05/11/23  Length of Stay: 6  Primary Care Physician: David Frazier MD    Subjective   Chief Complaint: Altered mental status/pneumonia/GI bleed    HPI   Patient is on 2 L.  Patient no acute distress.  Hypokalemia resolved.  Will reevaluate liver enzyme, BMP in a.m., acute hepatitis panel, ultrasound the liver.  Thrombocytopenia improving.  Slight decrease in hemoglobin, no sign of acute bleed.    Review of Systems   Unable to obtain due to difficult to communicate, patient is unable to talk, chronic condition.  All pertinent negatives and positives are as above. All other systems have been reviewed and are negative unless otherwise stated.     Objective    Temp:  [97.6 °F (36.4 °C)-98.4 °F (36.9 °C)] 97.6 °F (36.4 °C)  Heart Rate:  [85-95] 88  Resp:  [16-18] 16  BP: (125-132)/(68-76) 130/72  Physical Exam  Vitals and nursing note reviewed.   Constitutional:       Comments: Chronically ill.   Nonverbal  HENT:      Head: Normocephalic.   Eyes:      Conjunctiva/sclera: Conjunctivae normal.      Pupils: Pupils are equal, round, and reactive to light.   Neck:      Vascular: No JVD.   Cardiovascular:      Rate and Rhythm: Normal rate and regular rhythm.      Heart sounds: Normal heart sounds.   Pulmonary:      Effort: No respiratory distress.      Breath sounds: No wheezing or rales.      Comments: Patient is on 2 L of oxygen, clear, diminished breath sound bilateral.  Clear bilateral.  Chest:      Chest wall: No tenderness.   Abdominal:      General: Bowel sounds are normal. There is no distension.      Palpations: Abdomen is soft.      Tenderness: There is no abdominal tenderness.   Musculoskeletal:         General: No tenderness or deformity.      Cervical back: Neck supple.  Arthritis of the hand with deformity.  Patient is  bedbound.  Skin:     General: Skin is warm and dry.      Capillary Refill: Capillary refill takes 2 to 3 seconds.      Findings: No rash.   Neurological:      Cranial Nerves: No cranial nerve deficit.      Motor: Weakness present. No abnormal muscle tone.      Coordination: Coordination abnormal.      Gait: Gait abnormal.      Deep Tendon Reflexes: Reflexes normal.             Results Review:  I have reviewed the labs, radiology results, and diagnostic studies.    Laboratory Data:   Results from last 7 days   Lab Units 05/11/23  0505 05/09/23  0845 05/08/23  0401   WBC 10*3/mm3 9.44 7.43 7.40   HEMOGLOBIN g/dL 8.3* 9.0* 9.5*   HEMATOCRIT % 25.6* 27.2* 28.4*   PLATELETS 10*3/mm3 83* 72* 77*        Results from last 7 days   Lab Units 05/11/23  0505 05/09/23  1049 05/08/23  0455 05/07/23  0537 05/06/23  0400 05/05/23  2000   SODIUM mmol/L 144 139 150* 153*   < > 144   POTASSIUM mmol/L 3.8 3.4* 3.3* 3.1*   < > 3.9   CHLORIDE mmol/L 110* 107 117* 123*   < > 114*   CO2 mmol/L 29.0 24.0 26.0 26.0   < > 25.0   BUN mg/dL 7* 6* 18 31*   < > 56*   CREATININE mg/dL 0.47* 0.35* 0.45* 0.51*   < > 0.83   CALCIUM mg/dL 8.4* 8.1* 8.5* 8.3*   < > 7.8*   BILIRUBIN mg/dL  --   --  0.6 0.7  --  0.3   ALK PHOS U/L  --   --  60 51  --  53   ALT (SGPT) U/L  --   --  48* 42*  --  54*   AST (SGOT) U/L  --   --  40* 39*  --  39*   GLUCOSE mg/dL 100* 117* 87 78   < > 145*    < > = values in this interval not displayed.       Culture Data:   Blood Culture   Date Value Ref Range Status   05/05/2023 No growth at 5 days  Final       Radiology Data:   Imaging Results (Last 24 Hours)     ** No results found for the last 24 hours. **          I have reviewed the patient's current medications.     Assessment/Plan   Assessment  Active Hospital Problems    Diagnosis    • **Septic shock    • Right lower lobe pneumonia    • Intellectual disability    • Anemia    • Thrombocytopenia    • Transaminitis    • Hypoxia    • Metabolic encephalopathy    • Acute  blood loss anemia    • Hypovolemic shock        Treatment Plan  HPI . Patient will be admitted at [facility].  She is transferred here from [facility].  Her primary care provider had her in the hospital there for the last 2 days.  She presented with hypothermia and altered mental status.  He thought at first she was just dehydrated and then she ultimately developed a right lower lobe pneumonia and some hypoxemia after initially having clear chest imaging and a normal urinalysis.  She became hypotensive.  A central line was placed and she was started on norepinephrine and sent here.     GI bleed.  Black tarry stool improving.  GI consult.  Hemoglobin stable.  Protonix drip.  EGD 5/6/2023- 2 subcentimeter fusiform ulcers with overlying eschar and dark adherent clot- 2 clip was placed, epinephrine was injected, tattoo ink.  Second EGD 5/9/2023- original described ulcers visualized- all clips still attached-surrounding petechiae, large also reveals blood clot has migrated and underlying ulcer revealed 3 mm visible vessels starting to epithelialize-no residual bleeding-no other high risk stigmata-no blood seen, duodenum-bulbar duodenitis-no blood seen-bile noted.  Vascular recommendation- conservative treatment.  If continued bleeding or worsening of hemoglobin and hypotension then to consider coil embolization.  General surgery recommendation- advance diet to purée, IV Protonix while in the hospital-change to p.o. Protonix twice a day until follow-up EGD, EGD in 4 to 6 weeks to confirm ulcer healing-we will arrange an office, avoid aspirin and NSAID.  Follow-up with GI 2 to 4 weeks postdischarge.     Pneumonia/patient arrives somnolent/hypothermia/altered mental status . Zosyn.  Vancomycin DC 5/6/2023 due to negative MRSA screen..  Chest x-ray-  Right-sided CVL with tip overlying the low SVC,  No pneumothorax,  Veiling RIGHT basilar opacity, likely representing small layering  pleural effusion and/or atelectasis.  Patient is currently on 2 L of oxygen.     Hypotension.  Resolved.  Levophed DC yesterday 5/6/2023.     Hypernatremia.  Resolved.     Hypokalemia.  Resolved.     Anemia.  Status post 3 units of blood transfusion last night.  Hemoglobin slight decrease.  No sign of acute bleed.     Thrombocytopenia.    Status post 1 unit of platelets transfusion.  Platelet improving.     Elevated liver enzyme.  CMP in AM.  Ultrasound the liver.  Acute hepatitis panel.     Buttock wound, stage II.  Wound care consult.      History of stroke and cognitive deficit according to transfer physician.   Patient unable to provide any history.  Chronic right-sided weakness, nonverbal, patient does ambulate at baseline.     Patient from a group home in [facility].     Nutrition .  Puréed.     Deconditioning . PT and OT consult     Blood culture with SUSHIL-  no growth in 3 days.  Legionella antigen-negative.  MRSA screen-negative.  Strep pneumo-negative.       Patient transferred from [facility].   [facility] a [facility] the [facility].  [phone]-Vavheja Hamilton emergency contact, snow of [facility].   Patient lives in [facility].  At baseline patient get around a wheelchair, unable to use spoon to feed herself, she can  food with her hand and had to be spoon fed. Behavior wise baseline aggressive and yelling, this has been better with change in psych medications.      Palliative care consult . Consult  for nursing home placement.     Medical Decision Making  Number and Complexity of problems: Hypotension/pneumonia/GI bleed/anemia/failure to thrive/buttock wound/hyponatremia  Differential Diagnosis: None.     Conditions and Status        Improving.     [facility] Data  External documents reviewed: Previous note .  Cardiac tracing (EKG, telemetry) interpretation: Sinus .  Radiology interpretation: X-ray .  Labs reviewed: Laboratory  Any tests  that were considered but not ordered: Lab now and in AM.     Decision rules/scores evaluated (example [id number], [place], etc): None     Discussed with: Nursing and patient.     Care Planning  Shared decision making: Nursing and patient  Code status and discussions: Full code.  [place]     Disposition  Social Determinants of Health that impact treatment or disposition: Patient is from a group home, [place] of the [facility].  Pending rehab placement.     Electronically signed by Gallo Hazel MD, 05/11/23, 10:34 CDT.

## 2023-05-12 ENCOUNTER — APPOINTMENT (OUTPATIENT)
Dept: ULTRASOUND IMAGING | Facility: HOSPITAL | Age: 73
End: 2023-05-12
Payer: MEDICARE

## 2023-05-12 LAB
ALBUMIN SERPL-MCNC: 2.7 G/DL (ref 3.5–5.2)
ALBUMIN/GLOB SERPL: 1.1 G/DL
ALP SERPL-CCNC: 71 U/L (ref 39–117)
ALT SERPL W P-5'-P-CCNC: 23 U/L (ref 1–33)
ANION GAP SERPL CALCULATED.3IONS-SCNC: 5 MMOL/L (ref 5–15)
AST SERPL-CCNC: 16 U/L (ref 1–32)
BILIRUB SERPL-MCNC: 0.3 MG/DL (ref 0–1.2)
BUN SERPL-MCNC: 8 MG/DL (ref 8–23)
BUN/CREAT SERPL: 16.3 (ref 7–25)
CALCIUM SPEC-SCNC: 8.8 MG/DL (ref 8.6–10.5)
CHLORIDE SERPL-SCNC: 109 MMOL/L (ref 98–107)
CO2 SERPL-SCNC: 30 MMOL/L (ref 22–29)
CREAT SERPL-MCNC: 0.49 MG/DL (ref 0.57–1)
DEPRECATED RDW RBC AUTO: 53.5 FL (ref 37–54)
EGFRCR SERPLBLD CKD-EPI 2021: 99.7 ML/MIN/1.73
ERYTHROCYTE [DISTWIDTH] IN BLOOD BY AUTOMATED COUNT: 16.5 % (ref 12.3–15.4)
GLOBULIN UR ELPH-MCNC: 2.5 GM/DL
GLUCOSE SERPL-MCNC: 90 MG/DL (ref 65–99)
HAV IGM SERPL QL IA: NORMAL
HBV CORE IGM SERPL QL IA: NORMAL
HBV SURFACE AG SERPL QL IA: NORMAL
HCT VFR BLD AUTO: 25.1 % (ref 34–46.6)
HCV AB SER DONR QL: NORMAL
HGB BLD-MCNC: 7.9 G/DL (ref 12–15.9)
MCH RBC QN AUTO: 28.7 PG (ref 26.6–33)
MCHC RBC AUTO-ENTMCNC: 31.5 G/DL (ref 31.5–35.7)
MCV RBC AUTO: 91.3 FL (ref 79–97)
PLATELET # BLD AUTO: 113 10*3/MM3 (ref 140–450)
PMV BLD AUTO: 12.1 FL (ref 6–12)
POTASSIUM SERPL-SCNC: 3.7 MMOL/L (ref 3.5–5.2)
PROT SERPL-MCNC: 5.2 G/DL (ref 6–8.5)
RBC # BLD AUTO: 2.75 10*6/MM3 (ref 3.77–5.28)
SODIUM SERPL-SCNC: 144 MMOL/L (ref 136–145)
WBC NRBC COR # BLD: 9.66 10*3/MM3 (ref 3.4–10.8)

## 2023-05-12 PROCEDURE — 76705 ECHO EXAM OF ABDOMEN: CPT

## 2023-05-12 PROCEDURE — 80053 COMPREHEN METABOLIC PANEL: CPT | Performed by: FAMILY MEDICINE

## 2023-05-12 PROCEDURE — 97110 THERAPEUTIC EXERCISES: CPT

## 2023-05-12 PROCEDURE — 85027 COMPLETE CBC AUTOMATED: CPT | Performed by: FAMILY MEDICINE

## 2023-05-12 PROCEDURE — 80074 ACUTE HEPATITIS PANEL: CPT | Performed by: FAMILY MEDICINE

## 2023-05-12 PROCEDURE — 97535 SELF CARE MNGMENT TRAINING: CPT

## 2023-05-12 PROCEDURE — 97530 THERAPEUTIC ACTIVITIES: CPT

## 2023-05-12 RX ORDER — PANTOPRAZOLE SODIUM 40 MG/10ML
40 INJECTION, POWDER, LYOPHILIZED, FOR SOLUTION INTRAVENOUS
Status: DISCONTINUED | OUTPATIENT
Start: 2023-05-12 | End: 2023-05-15

## 2023-05-12 RX ADMIN — PANTOPRAZOLE SODIUM 8 MG/HR: 40 INJECTION, POWDER, FOR SOLUTION INTRAVENOUS at 00:23

## 2023-05-12 RX ADMIN — PANTOPRAZOLE SODIUM 8 MG/HR: 40 INJECTION, POWDER, FOR SOLUTION INTRAVENOUS at 10:52

## 2023-05-12 RX ADMIN — COLLAGENASE SANTYL 1 APPLICATION: 250 OINTMENT TOPICAL at 21:29

## 2023-05-12 RX ADMIN — COLLAGENASE SANTYL 1 APPLICATION: 250 OINTMENT TOPICAL at 10:49

## 2023-05-12 RX ADMIN — PANTOPRAZOLE SODIUM 8 MG/HR: 40 INJECTION, POWDER, FOR SOLUTION INTRAVENOUS at 05:16

## 2023-05-12 RX ADMIN — Medication 10 ML: at 21:29

## 2023-05-12 RX ADMIN — Medication 10 ML: at 10:49

## 2023-05-12 NOTE — PLAN OF CARE
Goal Outcome Evaluation:  Plan of Care Reviewed With: patient        Progress: no change  Outcome Evaluation: Sinus edward/ Sinus/ Sinus Tach  on tele. Pt continues to remove nasal cannula while sleeping. Her O2 sats are remaining in the mid 90's while on RA. Pt was made NPO in preparation for procedure today. Pt words are still difficult to decipher r/t garbled speech. Pt rested well inbetween care. Safety maintained.

## 2023-05-12 NOTE — PLAN OF CARE
Goal Outcome Evaluation:  Plan of Care Reviewed With: patient        Progress: no change  Outcome Evaluation: Grooming tasks MaxA. Application of lotion pt was able to reach to B forearms and rub in needing increased time for all tasks. Provided lotion to face pt did require Max-DepA for this d/t uncontrolled movements and decreased strength L. Pt with improved LUE movement compared to eval pt bringing UE up to face and utilizing to point. Difficult to keep on track for ther ex but pt would reach x10 touching VAIL/Ls hand. Completed P-A-AAROM guiding pt through ther ex each plane/range BUEs. Pt rolls depA for repositioning but pt able to lift shoulders off bed and scoot self, difficulty completing on command. Pt attempting to speak words this date, difficult to understand, pt points and shakes head yes/no for basic needs. Continue OT POC Recommend continued rehab at d/c.

## 2023-05-12 NOTE — THERAPY TREATMENT NOTE
Patient Name: Jess Ramsey  : 1950    MRN: [id number]                              Today's Date: 2023       Admit Date: 2023    Visit Dx: Therapist utilized gait belt, applied non-slipped socks, provided fall risk education/prevention, & facilitated muscle strengthening PRN to reduce patient falls risk during this session.      ICD-10-CM ICD-9-CM   1. Acute blood loss anemia  D62 285.1   2. Hypovolemic shock  R57.1 785.59   3. Oral phase dysphagia  R13.11 787.21   4. Acute gastric ulcer with hemorrhage  K25.0 531.00   5. Impaired mobility and ADLs  Z74.09 V49.89    Z78.9    6. Impaired mobility  Z74.09 799.89     Patient Active Problem List   Diagnosis   • Septic shock   • Right lower lobe pneumonia   • Intellectual disability   • Anemia   • Thrombocytopenia   • Transaminitis   • Hypoxia   • Metabolic encephalopathy   • Acute blood loss anemia   • Hypovolemic shock     Past Medical History:   Diagnosis Date   • Gastritis    • Hypertension    • Intellectual disability    • Psychosis    • Right hemiparesis    • Stroke      Past Surgical History:   Procedure Laterality Date   • ENDOSCOPY N/A 2023    Procedure: ESOPHAGOGASTRODUODENOSCOPY WITH ANESTHESIA;  Surgeon: Sherif Villegas MD;  Location: [facility] OR;  Service: Gastroenterology;  Laterality: N/A;  PRE GI BLEED  POST gastric ulcers,ink and clip   • ENDOSCOPY N/A 2023    Procedure: ESOPHAGOGASTRODUODENOSCOPY WITH ANESTHESIA;  Surgeon: Sherif Villegas MD;  Location: [facility] ENDOSCOPY;  Service: Gastroenterology;  Laterality: N/A;  Pre: Anemia  Post: Gastric ulcers  David Frazier MD   • HIP SURGERY Right       General Information     Row Name 23 0945          OT Time and Intention    Document Type therapy note (daily note)  -MT     Mode of Treatment occupational therapy  -MT     Row Name 23 0945          General Information    Patient Profile Reviewed yes  -MT     Existing Precautions/Restrictions fall  -MT     Row Name  05/12/23 [facility]          Cognition    Orientation Status (Cognition) unable/difficult to assess  -MT     Row Name 05/12/23 [facility]          Safety Issues, Functional Mobility    Impairments Affecting Function (Mobility) balance;cognition;strength;endurance/activity tolerance;grasp;pain;muscle tone abnormal;postural/trunk control;range of motion (ROM)  -MT           User Key  (r) = Recorded By, (t) = Taken By, (c) = Cosigned By    Initials Name Provider Type    MT Breanne Petty COTA Occupational Therapist Assistant                 Mobility/ADL's     Row Name 05/12/23 [facility]          Bed Mobility    Rolling Left [place] (Bed Mobility) maximum assist (25% patient effort)  -MT     Rolling Right [place] (Bed Mobility) maximum assist (25% patient effort)  -[facility] Name 05/12/23 [facility]          Activities of Daily Living    BADL Assessment/Intervention upper body dressing;grooming  -MT     Row Name 05/12/23 [facility]          Upper Body Dressing Assessment/Training    [place] Level (Upper Body Dressing) don;doff;maximum assist (25% patient effort);dependent (less than 25% patient effort)  -MT     Position (Upper Body Dressing) supine  -MT     Row Name 05/12/23 [facility]          Grooming Assessment/Training    Comment, (Grooming) Application of lotion pt was able to reach to B forearms and rub in needing increased time for all tasks. Provided lotion to face pt did require Max-DepA for this d/t uncontrolled movements and decreased strength L  -MT           User Key  (r) = Recorded By, (t) = Taken By, (c) = Cosigned By    Initials Name Provider Type    MT Breanne Petty COTA Occupational Therapist Assistant               Obj/Interventions     Row Name 05/12/23 [facility]          Motor Skills    Therapeutic Exercise other (see comments)  Pt with improved LUE movement compared to eval pt bringing UE up to face and utilizing to point. Difficult to keep on track for ther ex but pt would reach x10 touching VAIL/Ls hand. Completed  P-A-AAROM guiding pt throught ther ex each plane/range BUEs.  -MT           User Key  (r) = Recorded By, (t) = Taken By, (c) = Cosigned By    Initials Name Provider Type    Breanne Boggs COTA Occupational Therapist Assistant               Goals/Plan    No documentation.                Clinical Impression     Row Name 05/12/23 0945          Pain Scale: FACES Pre/Post-Treatment    Pain: FACES Scale, Pretreatment 4-->hurts little more  -MT     Posttreatment Pain Rating 4-->hurts little more  L elbow stretched  -MT     Row Name 05/12/23 0945          Therapy Plan Review/Discharge Plan (OT)    Anticipated Discharge Disposition (OT) skilled nursing facility  -[facility] Name 05/12/23 0945          Positioning and Restraints    Pre-Treatment Position in bed  -MT     Post Treatment Position bed  -MT     In Bed fowlers;call light within reach;encouraged to call for assist;side rails up x2;exit alarm on  -MT           User Key  (r) = Recorded By, (t) = Taken By, (c) = Cosigned By    Initials Name Provider Type    Breanne Boggs COTA Occupational Therapist Assistant               Outcome Measures     Row Name 05/12/23 0945          How much help from another is currently needed...    Putting on and taking off regular lower body clothing? 1  -MT     Bathing (including washing, rinsing, and drying) 1  -MT     Toileting (which includes using toilet bed pan or urinal) 1  -MT     Putting on and taking off regular upper body clothing 1  -MT     Taking care of personal grooming (such as brushing teeth) 1  -MT     Eating meals 1  -MT     AM-PAC 6 Clicks Score (OT) 6  -MT     [facility] Name 05/12/23 0849          How much help from another person do you currently need...    Turning from your back to your side while in flat bed without using bedrails? 1  -TB     Moving from lying on back to sitting on the side of a flat bed without bedrails? 1  -TB     Moving to and from a bed to a chair (including a wheelchair)? 1  -TB      Standing up from a chair using your arms (e.g., wheelchair, bedside chair)? 1  -TB     Climbing 3-5 steps with a railing? 1  -TB     To walk in hospital room? 1  -TB     AM-PAC 6 Clicks Score (PT) 6  -TB     Highest level of mobility 2 --> Bed activities/dependent transfer  -TB     Row Name 05/12/23 0844          Functional Assessment    Outcome Measure Options AM-PAC 6 Clicks Basic Mobility (PT)  -TB           User Key  (r) = Recorded By, (t) = Taken By, (c) = Cosigned By    Initials Name Provider Type    TB Toni Hoffman R, PTA Physical Therapist Assistant    Breanne Boggs COTA Occupational Therapist Assistant                Occupational Therapy Education     Title: PT OT SLP Therapies (In Progress)     Topic: Occupational Therapy (In Progress)     Point: ADL training (Done)     Description:   Instruct learner(s) on proper safety adaptation and remediation techniques during self care or transfers.   Instruct in proper use of assistive devices.              Learning Progress Summary           Patient Acceptance, E, VU,NR by DIANA at 5/11/2023 1043    Comment: Pt educated on safety during bed mobility    Acceptance, E, NR,NL by ЮЛИЯ at 5/10/2023 1528                   Point: Home exercise program (Not Started)     Description:   Instruct learner(s) on appropriate technique for monitoring, assisting and/or progressing therapeutic exercises/activities.              Learner Progress:  Not documented in this visit.          Point: Precautions (In Progress)     Description:   Instruct learner(s) on prescribed precautions during self-care and functional transfers.              Learning Progress Summary           Patient Acceptance, E, NR,NL by ЮЛИЯ at 5/10/2023 1528                   Point: Body mechanics (Not Started)     Description:   Instruct learner(s) on proper positioning and spine alignment during self-care, functional mobility activities and/or exercises.              Learner Progress:  Not documented in  this visit.                      User Key     Initials Effective Dates Name Provider Type Discipline     11/10/21 -  Jessenia Alexandre, OTR/L, CSRS Occupational Therapist OT     02/20/23 -  Juan Pablo Romano OTA Student OT Student OT              OT Recommendation and Plan     Plan of Care Review  Plan of Care Reviewed With: patient  Progress: no change  Outcome Evaluation: Grooming tasks MaxA. Application of lotion pt was able to reach to B forearms and rub in needing increased time for all tasks. Provided lotion to face pt did require Max-DepA for this d/t uncontrolled movements and decreased strength L. Pt with improved LUE movement compared to eval pt bringing UE up to face and utilizing to point. Difficult to keep on track for ther ex but pt would reach x10 touching VAIL/Ls hand. Completed P-A-AAROM guiding pt through ther ex each plane/range BUEs. Pt rolls depA for repositioning but pt able to lift shoulders off bed and scoot self, difficulty completing on command. Pt attempting to speak words this date, difficult to understand, pt points and shakes head yes/no for basic needs. Continue OT POC Recommend continued rehab at d/c.     Time Calculation:    Time Calculation- OT     Row Name 05/12/23 0945             Time Calculation- OT    OT Start Time 0945  -MT      OT Stop Time 1025  -MT      OT Time Calculation (min) 40 min  -MT      Total Timed Code Minutes- OT 40 minute(s)  -MT      OT Received On 05/12/23  -MT         Timed Charges    13520 - OT Therapeutic Exercise Minutes 20  -MT      57624 - OT Self Care/Mgmt Minutes 20  -MT         Total Minutes    Timed Charges Total Minutes 40  -MT       Total Minutes 40  -MT            User Key  (r) = Recorded By, (t) = Taken By, (c) = Cosigned By    Initials Name Provider Type    MT Breanne Petty COTA Occupational Therapist Assistant              Therapy Charges for Today     Code Description Service Date Service Provider Modifiers Qty    [id number]  OT THER  PROC EA 15 MIN 5/12/2023 Breanne Petty COTA GO 2    [id number] HC OT SELF CARE/MGMT/TRAIN EA 15 MIN 5/12/2023 Breanne Petty COTA GO 1               YARED Woods  5/12/2023

## 2023-05-12 NOTE — CASE MANAGEMENT/SOCIAL WORK
Continued Stay Note   Elizabeth     Patient Name: Jess Ramsey  MRN: [id number]  Today's Date: 5/12/2023    Admit Date: 5/5/2023    Plan: Possible [facility]   Discharge Plan     Row Name 05/12/23 1018       Plan    Plan Possible [facility]    Patient/Family in Agreement with Plan yes    Plan Comments Spoke with Aliyah Barger from [facility] [phone] and they are going to possibly offer a bed if 1) financials cleared 2) pasrr cleared.  No SS was available on face sheet so received from guardian office- [phone] Viky.  Did speak with guardian in charge of patient-meaghan mercer Legal Guardian   [phone] and she says pt came here from [facility] [phone]. Meaghan aware of possible bed offer.  Aliyah does not feel this can be completed today. Will follow.               Discharge Codes    No documentation.               Expected Discharge Date and Time     Expected Discharge Date Expected Discharge Time    May 11, 2023             LYNN Jansen

## 2023-05-12 NOTE — THERAPY TREATMENT NOTE
Acute Care - Physical Therapy Treatment Note  [facility]     Patient Name: Jess Ramsey  : 1950  MRN: [id number]  Today's Date: 2023      Visit Dx:     ICD-10-CM ICD-9-CM   1. Acute blood loss anemia  D62 285.1   2. Hypovolemic shock  R57.1 785.59   3. Oral phase dysphagia  R13.11 787.21   4. Acute gastric ulcer with hemorrhage  K25.0 531.00   5. Impaired mobility and ADLs  Z74.09 V49.89    Z78.9    6. Impaired mobility  Z74.09 799.89     Patient Active Problem List   Diagnosis   • Septic shock   • Right lower lobe pneumonia   • Intellectual disability   • Anemia   • Thrombocytopenia   • Transaminitis   • Hypoxia   • Metabolic encephalopathy   • Acute blood loss anemia   • Hypovolemic shock     Past Medical History:   Diagnosis Date   • Gastritis    • Hypertension    • Intellectual disability    • Psychosis    • Right hemiparesis    • Stroke      Past Surgical History:   Procedure Laterality Date   • ENDOSCOPY N/A 2023    Procedure: ESOPHAGOGASTRODUODENOSCOPY WITH ANESTHESIA;  Surgeon: Sherif Villegas MD;  Location: [facility] OR;  Service: Gastroenterology;  Laterality: N/A;  PRE GI BLEED  POST gastric ulcers,ink and clip   • ENDOSCOPY N/A 2023    Procedure: ESOPHAGOGASTRODUODENOSCOPY WITH ANESTHESIA;  Surgeon: Sherif Villegas MD;  Location: [facility] ENDOSCOPY;  Service: Gastroenterology;  Laterality: N/A;  Pre: Anemia  Post: Gastric ulcers  David Frazier MD   • HIP SURGERY Right 2019     PT Assessment (last 12 hours)     PT Evaluation and Treatment     Row Name 2344          Physical Therapy Time and Intention    Subjective Information complains of  -TB     Document Type therapy note (daily note)  -TB     Mode of Treatment physical therapy  -TB     Row Name 2344          General Information    Existing Precautions/Restrictions fall;oxygen therapy device and L/min  -TB     Row Name 2344          Bed Mobility    Bed Mobility rolling left;rolling right  -TB      Rolling Left Fisher (Bed Mobility) maximum assist (25% patient effort)  -TB     Rolling Right Fisher (Bed Mobility) maximum assist (25% patient effort)  -TB     Scooting/Bridging Fisher (Bed Mobility) dependent (less than 25% patient effort);2 person assist  -TB     Row Name 05/12/23 0844          Motor Skills    Therapeutic Exercise --  AAROM-PROM BLE  -TB     Row Name             Wound 05/05/23 2107 gluteal    Wound - Properties Group Placement Date: 05/05/23 -SY Placement Time: 2107 -SY Location: gluteal  -SY    Retired Wound - Properties Group Placement Date: 05/05/23 -SY Placement Time: 2107 -SY Location: gluteal  -SY    Retired Wound - Properties Group Date first assessed: 05/05/23 -SY Time first assessed: 2107 -SY Location: gluteal  -SY    Row Name 05/12/23 0844          Plan of Care Review    Plan of Care Reviewed With patient  -TB     Progress no change  -TB     Outcome Evaluation Pt agreeable to PT session. Rolled Max A to put pt on bedpan. AFter a few mins Max A to get pt off and cleaned up. Rolled both ways Max A to fix bedding. Pt initiated rolling. Perfromed AAROM-PROM BLE exercises w/ increased time. Max x 2 to scoot pt up in bed. Transport came to take pt for testing.  -TB     Row Name 05/12/23 0844          Positioning and Restraints    Pre-Treatment Position in bed  -TB     Post Treatment Position bed  -TB     In Bed fowlers;with other staff  -TB           User Key  (r) = Recorded By, (t) = Taken By, (c) = Cosigned By    Initials Name Provider Type    TB Toni Hoffman, PTA Physical Therapist Assistant    Clemente Caldera, RN Registered Nurse                Physical Therapy Education     Title: PT OT SLP Therapies (In Progress)     Topic: Physical Therapy (In Progress)     Point: Mobility training (In Progress)     Learning Progress Summary           Patient Nonacceptance, E, NL by SB at 5/10/2023 1610    Comment: pt edu on POC, benefits of act and d/c plans                    Point: Home exercise program (Not Started)     Learner Progress:  Not documented in this visit.          Point: Body mechanics (Not Started)     Learner Progress:  Not documented in this visit.          Point: Precautions (In Progress)     Learning Progress Summary           Patient Nonacceptance, LEI, NL by JANIS at 5/10/2023 1610    Comment: pt edu on POC, benefits of act and d/c plans                               User Key     Initials Effective Dates Name Provider Type Discipline    SB 06/16/21 -  Kala Rodriguez, PT DPT Physical Therapist PT              PT Recommendation and Plan     Plan of Care Reviewed With: patient  Progress: no change  Outcome Evaluation: Pt agreeable to PT session. Rolled Max A to put pt on bedpan. AFter a few mins Max A to get pt off and cleaned up. Rolled both ways Max A to fix bedding. Pt initiated rolling. Perfromed AAROM-PROM BLE exercises w/ increased time. Max x 2 to scoot pt up in bed. Transport came to take pt for testing.   Outcome Measures     Row Name 05/12/23 0844 05/11/23 1423 05/11/23 1000       How much help from another person do you currently need...    Turning from your back to your side while in flat bed without using bedrails? 1  -TB 1  -TB --    Moving from lying on back to sitting on the side of a flat bed without bedrails? 1  -TB 1  -TB --    Moving to and from a bed to a chair (including a wheelchair)? 1  -TB 1  -TB --    Standing up from a chair using your arms (e.g., wheelchair, bedside chair)? 1  -TB 1  -TB --    Climbing 3-5 steps with a railing? 1  -TB 1  -TB --    To walk in hospital room? 1  -TB 1  -TB --    AM-PAC 6 Clicks Score (PT) 6  -TB 6  -TB --       How much help from another is currently needed...    Putting on and taking off regular lower body clothing? -- -- 1  -LS (r) GK (t) LS (c)    Bathing (including washing, rinsing, and drying) -- -- 1  -LS (r) GK (t) LS (c)    Toileting (which includes using toilet bed pan or urinal) -- -- 1  -LS (r)  GK (t) LS (c)    Putting on and taking off regular upper body clothing -- -- 1  -LS (r) GK (t) LS (c)    Taking care of personal grooming (such as brushing teeth) -- -- 1  -LS (r) GK (t) LS (c)    Eating meals -- -- 1  -LS (r) GK (t) LS (c)    AM-PAC 6 Clicks Score (OT) -- -- 6  -LS (r) GK (t)       Functional Assessment    Outcome Measure Options AM-PAC 6 Clicks Basic Mobility (PT)  -TB AM-PAC 6 Clicks Basic Mobility (PT)  -TB AM-PAC 6 Clicks Daily Activity (OT)  -LS (r) GK (t) LS (c)          User Key  (r) = Recorded By, (t) = Taken By, (c) = Cosigned By    Initials Name Provider Type    TB Toni Hoffman PTA Physical Therapist Assistant    Ana M Burton COTA Occupational Therapist Assistant    GK Juan Pablo Romano OTA Student OT Student                 Time Calculation:    PT Charges     Row Name 05/12/23 0912             Time Calculation    Start Time 0844  -TB      Stop Time 0909  -TB      Time Calculation (min) 25 min  -TB      PT Received On 05/12/23  -TB         Time Calculation- PT    Total Timed Code Minutes- PT 25 minute(s)  -TB            User Key  (r) = Recorded By, (t) = Taken By, (c) = Cosigned By    Initials Name Provider Type    Toni Narayan PTA Physical Therapist Assistant              Therapy Charges for Today     Code Description Service Date Service Provider Modifiers Qty    72846244948 HC PT THER PROC EA 15 MIN 5/11/2023 Toni Hoffman, PTA GP 2    33666046675 HC PT THER PROC EA 15 MIN 5/12/2023 Toni Hoffman, PTA GP 1    96475730378 HC PT THERAPEUTIC ACT EA 15 MIN 5/12/2023 Toni Hoffman, PTA GP 1          PT G-Codes  Outcome Measure Options: AM-PAC 6 Clicks Basic Mobility (PT)  AM-PAC 6 Clicks Score (PT): 6  AM-PAC 6 Clicks Score (OT): 6    Toni Hoffman PTA  5/12/2023

## 2023-05-12 NOTE — PLAN OF CARE
Goal Outcome Evaluation:  Plan of Care Reviewed With: patient        Progress: no change  Outcome Evaluation: Pt agreeable to PT session. Rolled Max A to put pt on bedpan. AFter a few mins Max A to get pt off and cleaned up. Rolled both ways Max A to fix bedding. Pt initiated rolling. Perfromed AAROM-PROM BLE exercises w/ increased time. Max x 2 to scoot pt up in bed. Transport came to take pt for testing.

## 2023-05-12 NOTE — PROGRESS NOTES
[facility] Medicine Services  INPATIENT PROGRESS NOTE    Length of Stay: 7  Date of Admission: 5/5/2023  Primary Care Physician: David Frazier MD    Subjective   Chief Complaint: Altered mental status/pneumonia/GI bleed    HPI   Patient is on room air.  Blood pressure stable, afebrile.  Elevated liver enzyme, resolved.  Slight decrease in hemoglobin, no sign of acute bleed.  Acute hepatitis panel-negative.  Ultrasound at the liver edge-no acute finding.    Review of Systems   Unable to obtain due to difficult to communicate, patient is unable to talk, chronic condition.    All pertinent negatives and positives are as above. All other systems have been reviewed and are negative unless otherwise stated.     Objective    Temp:  [97.8 °F (36.6 °C)-98.1 °F (36.7 °C)] 98 °F (36.7 °C)  Heart Rate:  [71-95] 71  Resp:  [16] 16  BP: (109-147)/(49-78) 141/78    Intake/Output Summary (Last 24 hours) at 5/12/2023 1340  Last data filed at 5/12/2023 0052  Gross per 24 hour   Intake 240 ml   Output 1425 ml   Net -1185 ml     Physical Exam  Vitals and nursing note reviewed.   Constitutional:       Comments: Chronically ill.   Nonverbal  HENT:      Head: Normocephalic.   Eyes:      Conjunctiva/sclera: Conjunctivae normal.      Pupils: Pupils are equal, round, and reactive to light.   Neck:      Vascular: No JVD.   Cardiovascular:      Rate and Rhythm: Normal rate and regular rhythm.      Heart sounds: Normal heart sounds.   Pulmonary:      Effort: No respiratory distress.      Breath sounds: No wheezing or rales.      Comments: Patient is on room air, clear, diminished breath sound bilateral.  Clear bilateral.  Chest:      Chest wall: No tenderness.   Abdominal:      General: Bowel sounds are normal. There is no distension.      Palpations: Abdomen is soft.      Tenderness: There is no abdominal tenderness.   Musculoskeletal:         General: Contracted extremities     Cervical back: Neck  supple.  Arthritis of the hand with deformity.  Patient is bedbound.    Skin:     General: Skin is warm and dry.      Capillary Refill: Capillary refill takes 2 to 3 seconds.      Findings: No rash.   Neurological:      Cranial Nerves: No cranial nerve deficit.      Motor: Weakness present. No abnormal muscle tone.      Coordination: Coordination abnormal.      Gait: Gait abnormal.      Deep Tendon Reflexes: Reflexes normal.      Results Review:  Lab Results (last 24 hours)     Procedure Component Value Units Date/Time    Hepatitis Panel, Acute [[id number]]  (Normal) Collected: 05/12/23 1225    Specimen: Blood Updated: 05/12/23 1307     Hepatitis B Surface Ag Non-Reactive     Hep A IgM Non-Reactive     Hep B C IgM Non-Reactive     Hepatitis C Ab Non-Reactive    Narrative:      Results may be falsely decreased if patient taking Biotin.     Comprehensive Metabolic Panel [[id number]]  (Abnormal) Collected: 05/12/23 1225    Specimen: Blood Updated: 05/12/23 1300     Glucose 90 mg/dL      BUN 8 mg/dL      Creatinine 0.49 mg/dL      Sodium 144 mmol/L      Potassium 3.7 mmol/L      Chloride 109 mmol/L      CO2 30.0 mmol/L      Calcium 8.8 mg/dL      Total Protein 5.2 g/dL      Albumin 2.7 g/dL      ALT (SGPT) 23 U/L      AST (SGOT) 16 U/L      Alkaline Phosphatase 71 U/L      Total Bilirubin 0.3 mg/dL      Globulin 2.5 gm/dL      A/G Ratio 1.1 g/dL      BUN/Creatinine Ratio 16.3     Anion Gap 5.0 mmol/L      eGFR 99.7 mL/min/1.73     Narrative:      GFR Normal >60  Chronic Kidney Disease <60  Kidney Failure <15    The GFR formula is only valid for adults with stable renal function between ages 18 and 70.    CBC (No Diff) [[id number]]  (Abnormal) Collected: 05/12/23 1225    Specimen: Blood Updated: 05/12/23 1239     WBC 9.66 10*3/mm3      RBC 2.75 10*6/mm3      Hemoglobin 7.9 g/dL      Hematocrit 25.1 %      MCV 91.3 fL      MCH 28.7 pg      MCHC 31.5 g/dL      RDW 16.5 %      RDW-SD 53.5 fl      MPV 12.1 fL      Platelets 113  10*3/mm3            Cultures:  Blood Culture   Date Value Ref Range Status   05/05/2023 No growth at 5 days  Final       Radiology Data:    Imaging Results (Last 24 Hours)     Procedure Component Value Units Date/Time    US Liver [[id number]] Collected: 05/12/23 1009     Updated: 05/12/23 1019    Narrative:      EXAM: US LIVER-      DATE: 5/12/2023 9:09 AM CDT     HISTORY: elevated liver enzymes. Reported prior pancreatic head cyst  measuring 5 mm on outside CT, which is not available at time of  dictation.     COMPARISON: No existing relevant imaging studies available     TECHNIQUE: Real-time ultrasound performed with representative images  saved to PACS.     FINDINGS:  PANCREAS. Partially visualized pancreas within normal limits. Main  pancreatic duct measures 3 mm proximally, within normal limits.     AORTA. Visualized proximal abdominal aorta appears grossly within normal  limits, distal obscured by bowel gas.     IVC. Partially visualized IVC within normal limits.     LIVER. Normal liver echogenicity and contour. Patent portal vein with  normal hepatopedal flow.     BILE DUCTS AND GALLBLADDER. No gallstones or sludge demonstrated. No  pericholecystic fluid. Limited assessment of gallbladder wall due to  contraction. Common bile duct measures 0.7 cm, within normal limits for  age.     RIGHT KIDNEY. Measures 9.2 x 3.8 x 4.4 cm. No hydronephrosis. There is a  1 cm simple appearing cyst with small dependent echogenicity with  twinkle artifact, which could represent nephrolithiasis or layering milk  of calcium.            Impression:      1. No acute abdominal finding by ultrasound.  2. RIGHT kidney with small echogenicity, could represent nephrolithiasis  or layering milk of calcium in a 1 cm otherwise simple appearing cyst.  No hydronephrosis demonstrated.     This report was finalized on 05/12/2023 10:16 by Dr Lelo Seaman MD.          No Known Allergies    Scheduled meds:   collagenase, 1 application, Topical,  Q12H  pantoprazole, 40 mg, Intravenous, BID AC  sodium chloride, 10 mL, Intravenous, Q12H        PRN meds:  •  acetaminophen **OR** acetaminophen **OR** acetaminophen  •  ipratropium-albuterol  •  Morphine  •  ondansetron  •  sodium chloride  •  sodium chloride  •  ziprasidone    Assessment/Plan       Septic shock    Right lower lobe pneumonia    Intellectual disability    Anemia    Thrombocytopenia    Transaminitis    Hypoxia    Metabolic encephalopathy    Acute blood loss anemia    Hypovolemic shock      Plan:  HPI . Patient will be admitted at [facility].  She is transferred here from [facility] in [facility].  Her primary care provider had her in the hospital there for the last 2 days.  She presented with hypothermia and altered mental status.  He thought at first she was just dehydrated and then she ultimately developed a right lower lobe pneumonia and some hypoxemia after initially having clear chest imaging and a normal urinalysis.  She became hypotensive.  A central line was placed and she was started on norepinephrine and sent here.     GI bleed.  Black tarry stool improving.  GI consult.  Hemoglobin stable.  Protonix drip.  EGD 5/6/2023- 2 subcentimeter fusiform ulcers with overlying eschar and dark adherent clot- 2 clip was placed, epinephrine was injected, tattoo ink.  Second EGD 5/9/2023- original described ulcers visualized- all clips still attached-surrounding petechiae, large also reveals blood clot has migrated and underlying ulcer revealed 3 mm visible vessels starting to epithelialize-no residual bleeding-no other high risk stigmata-no blood seen, duodenum-bulbar duodenitis-no blood seen-bile noted.  Vascular recommendation- conservative treatment.  If continued bleeding or worsening of hemoglobin and hypotension then to consider coil embolization.  General surgery recommendation- advance diet to purée, IV Protonix while in the hospital-change to p.o. Protonix twice a  day until follow-up EGD, EGD in 4 to 6 weeks to confirm ulcer healing-we will arrange an office, avoid aspirin and NSAID.  Follow-up with GI 2 to 4 weeks postdischarge.     Pneumonia/patient arrives somnolent/hypothermia/altered mental status . Zosyn.  Vancomycin DC 5/6/2023 due to negative MRSA screen..  Chest x-ray-  Right-sided CVL with tip overlying the low SVC,  No pneumothorax,  Veiling RIGHT basilar opacity, likely representing small layering pleural effusion and/or atelectasis.  Patient is currently on room air.     Hypotension.  Resolved.  Levophed DC yesterday 5/6/2023.     Hypernatremia.  Resolved.     Hypokalemia.  Resolved.     Anemia.  Status post 3 units of blood transfusion.  Hemoglobin slight decrease.  No sign of acute bleed.     Thrombocytopenia.    Status post 1 unit of platelets transfusion.  Platelet improving.     Elevated liver enzyme.  Resolved.    Acute hepatitis panel-negative.  Ultrasound the liver-No acute abdominal finding by ultrasound, RIGHT kidney with small echogenicity- could represent nephrolithiasis or layering milk of calcium in a 1 cm otherwise simple appearing cyst, No hydronephrosis demonstrated.     Buttock wound, stage II.  Wound care consult.      History of stroke and cognitive deficit according to transfer physician.   Patient unable to provide any history.  Chronic right-sided weakness, nonverbal, patient does ambulate at baseline.     Patient from a group home in [facility]     Nutrition .  Puréed.     Deconditioning . PT and OT consult     Blood culture with SUSHIL-  no growth in 3 days.  Legionella antigen-negative.  MRSA screen-negative.  Strep pneumo-negative.       Patient transferred from [facility].   Patient is a snow of the [facility].  [phone]-Pspzzka Hamilton emergency contact, snow of [facility].   Patient lives in [facility].  At baseline patient get around a wheelchair, unable to use spoon to feed  herself, she can  food with her hand and had to be spoon fed. Behavior wise baseline aggressive and yelling, this has been better with change in psych medications.      Palliative care consult . Consult  for nursing home placement.     Medical Decision Making  Number and Complexity of problems: Hypotension/pneumonia/GI bleed/anemia/failure to thrive/buttock wound/hyponatremia  Differential Diagnosis: None.     Conditions and Status        Improving.     MDM Data  External documents reviewed: Previous note .  Cardiac tracing (EKG, telemetry) interpretation: Sinus .  Radiology interpretation: X-ray .  Labs reviewed: Laboratory  Any tests that were considered but not ordered: Lab now and in AM.     Decision rules/scores evaluated (example [id number], [place], etc): None     Discussed with: Nursing and patient.     Care Planning  Shared decision making: Nursing and patient  Code status and discussions: Full code.  [place]     Disposition  Social Determinants of Health that impact treatment or disposition: Patient is from a group home, [place] the [facility].  Pending rehab placement.  Electronically signed by Gallo Hazel MD, 05/12/23, 1:40 PM CDT.

## 2023-05-12 NOTE — PLAN OF CARE
Goal Outcome Evaluation:              Outcome Evaluation: RD nutrition follow-up completed.  Pt's po intake average is 80% of 5 recorded meals.  Pt was NPO this morning.  Pt asleep at time of visit, no one in the room at this time.  Will continue to monitor po intake, supplement acceptance, and follow for further d/c needs

## 2023-05-13 LAB
ANION GAP SERPL CALCULATED.3IONS-SCNC: 7 MMOL/L (ref 5–15)
BUN SERPL-MCNC: 9 MG/DL (ref 8–23)
BUN/CREAT SERPL: 20 (ref 7–25)
CALCIUM SPEC-SCNC: 8.5 MG/DL (ref 8.6–10.5)
CHLORIDE SERPL-SCNC: 107 MMOL/L (ref 98–107)
CO2 SERPL-SCNC: 27 MMOL/L (ref 22–29)
CREAT SERPL-MCNC: 0.45 MG/DL (ref 0.57–1)
DEPRECATED RDW RBC AUTO: 59 FL (ref 37–54)
EGFRCR SERPLBLD CKD-EPI 2021: 101.7 ML/MIN/1.73
ERYTHROCYTE [DISTWIDTH] IN BLOOD BY AUTOMATED COUNT: 16.7 % (ref 12.3–15.4)
GLUCOSE SERPL-MCNC: 99 MG/DL (ref 65–99)
HCT VFR BLD AUTO: 26.3 % (ref 34–46.6)
HGB BLD-MCNC: 7.9 G/DL (ref 12–15.9)
MCH RBC QN AUTO: 29.2 PG (ref 26.6–33)
MCHC RBC AUTO-ENTMCNC: 30 G/DL (ref 31.5–35.7)
MCV RBC AUTO: 97 FL (ref 79–97)
PLATELET # BLD AUTO: 130 10*3/MM3 (ref 140–450)
PMV BLD AUTO: 12 FL (ref 6–12)
POTASSIUM SERPL-SCNC: 3.8 MMOL/L (ref 3.5–5.2)
RBC # BLD AUTO: 2.71 10*6/MM3 (ref 3.77–5.28)
SODIUM SERPL-SCNC: 141 MMOL/L (ref 136–145)
WBC NRBC COR # BLD: 8.84 10*3/MM3 (ref 3.4–10.8)

## 2023-05-13 PROCEDURE — 97110 THERAPEUTIC EXERCISES: CPT

## 2023-05-13 PROCEDURE — 85027 COMPLETE CBC AUTOMATED: CPT | Performed by: FAMILY MEDICINE

## 2023-05-13 PROCEDURE — 80048 BASIC METABOLIC PNL TOTAL CA: CPT | Performed by: FAMILY MEDICINE

## 2023-05-13 RX ADMIN — COLLAGENASE SANTYL 1 APPLICATION: 250 OINTMENT TOPICAL at 20:06

## 2023-05-13 RX ADMIN — Medication 10 ML: at 08:25

## 2023-05-13 RX ADMIN — PANTOPRAZOLE SODIUM 40 MG: 40 INJECTION, POWDER, FOR SOLUTION INTRAVENOUS at 17:05

## 2023-05-13 RX ADMIN — COLLAGENASE SANTYL 1 APPLICATION: 250 OINTMENT TOPICAL at 08:26

## 2023-05-13 RX ADMIN — Medication 10 ML: at 20:06

## 2023-05-13 RX ADMIN — PANTOPRAZOLE SODIUM 40 MG: 40 INJECTION, POWDER, FOR SOLUTION INTRAVENOUS at 08:25

## 2023-05-13 NOTE — PROGRESS NOTES
[facility] Medicine Services  INPATIENT PROGRESS NOTE    Patient Name: Jess Ramsey  Date of Admission: 5/5/2023  Today's Date: 05/13/23  Length of Stay: 8  Primary Care Physician: David Frazier MD    Subjective   Chief Complaint: Altered mental status/pneumonia/GI bleed    HPI   Patient is on room air.  Blood pressure is low, chronic.  Stop IV fluids for patient get around better.  Anemia stable.  Platelet count is improving.    Review of Systems   Unable to obtain due to difficult to communicate, patient is unable to talk, chronic condition.  All pertinent negatives and positives are as above. All other systems have been reviewed and are negative unless otherwise stated.     Objective    Temp:  [97.5 °F (36.4 °C)-98.4 °F (36.9 °C)] 98.3 °F (36.8 °C)  Heart Rate:  [58-73] 73  Resp:  [16] 16  BP: ()/(65-83) 99/65  Physical Exam  Vitals and nursing note reviewed.   Constitutional:       Comments: Chronically ill.   Nonverbal  HENT:      Head: Normocephalic.   Eyes:      Conjunctiva/sclera: Conjunctivae normal.      Pupils: Pupils are equal, round, and reactive to light.   Neck:      Vascular: No JVD.   Cardiovascular:      Rate and Rhythm: Normal rate and regular rhythm.      Heart sounds: Normal heart sounds.   Pulmonary:      Effort: No respiratory distress.      Breath sounds: No wheezing or rales.      Comments: Patient is on room air, clear, diminished breath sound bilateral.  Clear bilateral.  Chest:      Chest wall: No tenderness.   Abdominal:      General: Bowel sounds are normal. There is no distension.      Palpations: Abdomen is soft.      Tenderness: There is no abdominal tenderness.   Musculoskeletal:         General: Contracted extremities     Cervical back: Neck supple.  Arthritis of the hand with deformity.  Patient is bedbound.    Skin:     General: Skin is warm and dry.      Capillary Refill: Capillary refill takes 2 to 3 seconds.      Findings: No  rash.   Neurological:      Cranial Nerves: No cranial nerve deficit.      Motor: Weakness present. No abnormal muscle tone.      Coordination: Coordination abnormal.      Gait: Gait abnormal.      Deep Tendon Reflexes: Reflexes normal.       Results Review:  I have reviewed the labs, radiology results, and diagnostic studies.    Laboratory Data:   Results from last 7 days   Lab Units 05/13/23  0618 05/12/23  1225 05/11/23  0505   WBC 10*3/mm3 8.84 9.66 9.44   HEMOGLOBIN g/dL 7.9* 7.9* 8.3*   HEMATOCRIT % 26.3* 25.1* 25.6*   PLATELETS 10*3/mm3 130* 113* 83*        Results from last 7 days   Lab Units 05/13/23  0618 05/12/23  1225 05/11/23  0505 05/09/23  1049 05/08/23  0455 05/07/23  0537   SODIUM mmol/L 141 144 144   < > 150* 153*   POTASSIUM mmol/L 3.8 3.7 3.8   < > 3.3* 3.1*   CHLORIDE mmol/L 107 109* 110*   < > 117* 123*   CO2 mmol/L 27.0 30.0* 29.0   < > 26.0 26.0   BUN mg/dL 9 8 7*   < > 18 31*   CREATININE mg/dL 0.45* 0.49* 0.47*   < > 0.45* 0.51*   CALCIUM mg/dL 8.5* 8.8 8.4*   < > 8.5* 8.3*   BILIRUBIN mg/dL  --  0.3  --   --  0.6 0.7   ALK PHOS U/L  --  71  --   --  60 51   ALT (SGPT) U/L  --  23  --   --  48* 42*   AST (SGOT) U/L  --  16  --   --  40* 39*   GLUCOSE mg/dL 99 90 100*   < > 87 78    < > = values in this interval not displayed.       Culture Data:   No results found for: BLOODCX, URINECX, WOUNDCX, MRSACX, RESPCX, STOOLCX    Radiology Data:   Imaging Results (Last 24 Hours)     ** No results found for the last 24 hours. **          I have reviewed the patient's current medications.     Assessment/Plan   Assessment  Active Hospital Problems    Diagnosis    • **Septic shock    • Right lower lobe pneumonia    • Intellectual disability    • Anemia    • Thrombocytopenia    • Transaminitis    • Hypoxia    • Metabolic encephalopathy    • Acute blood loss anemia    • Hypovolemic shock        Treatment Plan  HPI . Patient will be admitted at [facility].  She is transferred here from [facility] in [facility].  Her primary care provider had her in the hospital there for the last 2 days.  She presented with hypothermia and altered mental status.  He thought at first she was just dehydrated and then she ultimately developed a right lower lobe pneumonia and some hypoxemia after initially having clear chest imaging and a normal urinalysis.  She became hypotensive.  A central line was placed and she was started on norepinephrine and sent here.     GI bleed.  Black tarry stool improving.  GI consult.  Hemoglobin stable.  Protonix drip.  EGD 5/6/2023- 2 subcentimeter fusiform ulcers with overlying eschar and dark adherent clot- 2 clip was placed, epinephrine was injected, tattoo ink.  Second EGD 5/9/2023- original described ulcers visualized- all clips still attached-surrounding petechiae, large also reveals blood clot has migrated and underlying ulcer revealed 3 mm visible vessels starting to epithelialize-no residual bleeding-no other high risk stigmata-no blood seen, duodenum-bulbar duodenitis-no blood seen-bile noted.  Vascular recommendation- conservative treatment.  If continued bleeding or worsening of hemoglobin and hypotension then to consider coil embolization.  General surgery recommendation- advance diet to purée, IV Protonix while in the hospital-change to p.o. Protonix twice a day until follow-up EGD, EGD in 4 to 6 weeks to confirm ulcer healing-we will arrange an office, avoid aspirin and NSAID.  Follow-up with GI 2 to 4 weeks postdischarge.     Pneumonia/patient arrives somnolent/hypothermia/altered mental status . Zosyn.  Vancomycin DC 5/6/2023 due to negative MRSA screen..  Chest x-ray-  Right-sided CVL with tip overlying the low SVC,  No pneumothorax,  Veiling RIGHT basilar opacity, likely representing small layering pleural effusion and/or atelectasis.  Patient is currently on room air.     Hypotension.  Resolved.  Levophed DC yesterday  5/6/2023.     Hypernatremia.  Resolved.     Hypokalemia.  Resolved.     Anemia.  Status post 3 units of blood transfusion.  Hemoglobin slight decrease.  No sign of acute bleed.     Thrombocytopenia.    Status post 1 unit of platelets transfusion.  Platelet improving.     Elevated liver enzyme.  Resolved.    Acute hepatitis panel-negative.  Ultrasound the liver-No acute abdominal finding by ultrasound, RIGHT kidney with small echogenicity- could represent nephrolithiasis or layering milk of calcium in a 1 cm otherwise simple appearing cyst, No hydronephrosis demonstrated.     Buttock wound, stage II.  Wound care consult.      History of stroke and cognitive deficit according to transfer physician.   Patient unable to provide any history.  Chronic right-sided weakness, nonverbal, patient does ambulate at baseline.     Patient from a group home in [facility].     Nutrition .  Puréed.     Deconditioning . PT and OT consult     Blood culture with SUSHIL-  no growth in 3 days.  Legionella antigen-negative.  MRSA screen-negative.  Strep pneumo-negative.       Patient transferred from [facility].   Patient is a snow of the [facility].  [phone]-[place] emergency contact, snow of [facility].   Patient lives in [facility].  At baseline patient get around a wheelchair, unable to use spoon to feed herself, she can  food with her hand and had to be spoon fed. Behavior wise baseline aggressive and yelling, this has been better with change in psych medications.      Palliative care consult . Consult  for nursing home placement.     Medical Decision Making  Number and Complexity of problems: Hypotension/pneumonia/GI bleed/anemia/failure to thrive/buttock wound/hyponatremia  Differential Diagnosis: None.     Conditions and Status        Improving.     [facility] Data  External documents reviewed: Previous note .  Cardiac tracing (EK G, telemetry)  interpretation: Sinus .  Radiology interpretation: X-ray .  Labs reviewed: Laboratory  Any tests that were considered but not ordered: Lab now and in AM.     Decision rules/scores evaluated (example [id number], Wells, etc): None     Discussed with: Nursing and patient.     Care Planning  Shared decision making: Nursing and patient  Code status and discussions: Full code.  [place]     Disposition  Social Determinants of Health that impact treatment or disposition: Patient is from a group home, [place] of the [facility].  Pending rehab placement.    Electronically signed by Gallo Hazel MD, 05/13/23, 11:06 CDT.

## 2023-05-13 NOTE — THERAPY TREATMENT NOTE
Acute Care - Physical Therapy Treatment Note  [facility]     Patient Name: Jess Ramsey  : 1950  MRN: [id number]  Today's Date: 2023      Visit Dx:     ICD-10-CM ICD-9-CM   1. Acute blood loss anemia  D62 285.1   2. Hypovolemic shock  R57.1 785.59   3. Oral phase dysphagia  R13.11 787.21   4. Acute gastric ulcer with hemorrhage  K25.0 531.00   5. Impaired mobility and ADLs  Z74.09 V49.89    Z78.9    6. Impaired mobility  Z74.09 799.89     Patient Active Problem List   Diagnosis   • Septic shock   • Right lower lobe pneumonia   • Intellectual disability   • Anemia   • Thrombocytopenia   • Transaminitis   • Hypoxia   • Metabolic encephalopathy   • Acute blood loss anemia   • Hypovolemic shock     Past Medical History:   Diagnosis Date   • Gastritis    • Hypertension    • Intellectual disability    • Psychosis    • Right hemiparesis    • Stroke      Past Surgical History:   Procedure Laterality Date   • ENDOSCOPY N/A 2023    Procedure: ESOPHAGOGASTRODUODENOSCOPY WITH ANESTHESIA;  Surgeon: Sherif Villegas MD;  Location: [facility] OR;  Service: Gastroenterology;  Laterality: N/A;  PRE GI BLEED  POST gastric ulcers,ink and clip   • ENDOSCOPY N/A 2023    Procedure: ESOPHAGOGASTRODUODENOSCOPY WITH ANESTHESIA;  Surgeon: Sherif Villegas MD;  Location: [facility] ENDOSCOPY;  Service: Gastroenterology;  Laterality: N/A;  Pre: Anemia  Post: Gastric ulcers  David Frazier MD   • HIP SURGERY Right 2019     PT Assessment (last 12 hours)     PT Evaluation and Treatment     Row Name 23 1057          Physical Therapy Time and Intention    Document Type therapy note (daily note)  -AB     Mode of Treatment physical therapy  -AB     Row Name 23 1057          General Information    Existing Precautions/Restrictions fall  -AB     Row Name 23 1057          Bed Mobility    Supine-Sit Guthrie (Bed Mobility) maximum assist (25% patient effort)  -AB     Sit-Supine Guthrie (Bed Mobility) maximum  assist (25% patient effort)  -AB     Assistive Device (Bed Mobility) bed rails;draw sheet;head of bed elevated  -AB     Row Name 05/13/23 1057          Motor Skills    Comments, Therapeutic Exercise AA-PROM Ilan LE 15 reps  -AB     Additional Documentation Comments, Therapeutic Exercise (Row)  -AB     Row Name             Wound 05/05/23 2107 gluteal    Wound - Properties Group Placement Date: 05/05/23 -SY Placement Time: 2107 -SY Location: gluteal  -SY    Retired Wound - Properties Group Placement Date: 05/05/23 -SY Placement Time: 2107 -SY Location: gluteal  -SY    Retired Wound - Properties Group Date first assessed: 05/05/23 -SY Time first assessed: 2107 -SY Location: gluteal  -SY          User Key  (r) = Recorded By, (t) = Taken By, (c) = Cosigned By    Initials Name Provider Type    AB Olivia Villanueva PTA Physical Therapist Assistant    Clemente Caldera, RN Registered Nurse                Physical Therapy Education     Title: PT OT SLP Therapies (In Progress)     Topic: Physical Therapy (In Progress)     Point: Mobility training (In Progress)     Learning Progress Summary           Patient Nonacceptance, E, NL by SB at 5/10/2023 1610    Comment: pt edu on POC, benefits of act and d/c plans                   Point: Home exercise program (Not Started)     Learner Progress:  Not documented in this visit.          Point: Body mechanics (Not Started)     Learner Progress:  Not documented in this visit.          Point: Precautions (In Progress)     Learning Progress Summary           Patient Nonacceptance, E, NL by SB at 5/10/2023 1610    Comment: pt edu on POC, benefits of act and d/c plans                               User Key     Initials Effective Dates Name Provider Type Discipline    SB 06/16/21 -  Kala Rodriguez, PT DPT Physical Therapist PT              PT Recommendation and Plan         Outcome Measures     Row Name 05/12/23 0844 05/11/23 1423 05/11/23 1000       How much help from another person do  you currently need...    Turning from your back to your side while in flat bed without using bedrails? 1  -TB 1  -TB --    Moving from lying on back to sitting on the side of a flat bed without bedrails? 1  -TB 1  -TB --    Moving to and from a bed to a chair (including a wheelchair)? 1  -TB 1  -TB --    Standing up from a chair using your arms (e.g., wheelchair, bedside chair)? 1  -TB 1  -TB --    Climbing 3-5 steps with a railing? 1  -TB 1  -TB --    To walk in hospital room? 1  -TB 1  -TB --    AM-PAC 6 Clicks Score (PT) 6  -TB 6  -TB --       How much help from another is currently needed...    Putting on and taking off regular lower body clothing? -- -- 1  -LS (r) GK (t) LS (c)    Bathing (including washing, rinsing, and drying) -- -- 1  -LS (r) GK (t) LS (c)    Toileting (which includes using toilet bed pan or urinal) -- -- 1  -LS (r) GK (t) LS (c)    Putting on and taking off regular upper body clothing -- -- 1  -LS (r) GK (t) LS (c)    Taking care of personal grooming (such as brushing teeth) -- -- 1  -LS (r) GK (t) LS (c)    Eating meals -- -- 1  -LS (r) GK (t) LS (c)    AM-PAC 6 Clicks Score (OT) -- -- 6  -LS (r) GK (t)       Functional Assessment    Outcome Measure Options AM-PAC 6 Clicks Basic Mobility (PT)  -TB AM-PAC 6 Clicks Basic Mobility (PT)  -TB AM-PAC 6 Clicks Daily Activity (OT)  -LS (r) GK (t) LS (c)          User Key  (r) = Recorded By, (t) = Taken By, (c) = Cosigned By    Initials Name Provider Type    TB Toni Hoffman, PTA Physical Therapist Assistant    Ana M Burton COTA Occupational Therapist Assistant    Juan Pablo Ni OTA Student OT Student                 Time Calculation:    PT Charges     Row Name 05/13/23 1057             Time Calculation    Start Time 1057  -AB      Stop Time 1121  -AB      Time Calculation (min) 24 min  -AB      PT Received On 05/13/23  -AB         Time Calculation- PT    Total Timed Code Minutes- PT 24 minute(s)  -AB            User Key  (r) =  Recorded By, (t) = Taken By, (c) = Cosigned By    Initials Name Provider Type    AB Olivia Villanueva, RIOS Physical Therapist Assistant              Therapy Charges for Today     Code Description Service Date Service Provider Modifiers Qty    [id number] HC PT THER PROC EA 15 MIN 5/13/2023 Olivia Villanueva PTA GP 2          PT G-Codes  Outcome Measure Options: AM-PAC 6 Clicks Basic Mobility (PT)  AM-PAC 6 Clicks Score (PT): 6  AM-PAC 6 Clicks Score (OT): 6    Olivia Villanueva PTA  5/13/2023

## 2023-05-14 LAB
ANION GAP SERPL CALCULATED.3IONS-SCNC: 7 MMOL/L (ref 5–15)
BUN SERPL-MCNC: 8 MG/DL (ref 8–23)
BUN/CREAT SERPL: 22.2 (ref 7–25)
CALCIUM SPEC-SCNC: 8.5 MG/DL (ref 8.6–10.5)
CHLORIDE SERPL-SCNC: 104 MMOL/L (ref 98–107)
CO2 SERPL-SCNC: 29 MMOL/L (ref 22–29)
CREAT SERPL-MCNC: 0.36 MG/DL (ref 0.57–1)
DEPRECATED RDW RBC AUTO: 53.1 FL (ref 37–54)
EGFRCR SERPLBLD CKD-EPI 2021: 107.3 ML/MIN/1.73
ERYTHROCYTE [DISTWIDTH] IN BLOOD BY AUTOMATED COUNT: 16 % (ref 12.3–15.4)
GLUCOSE SERPL-MCNC: 88 MG/DL (ref 65–99)
HCT VFR BLD AUTO: 23.4 % (ref 34–46.6)
HGB BLD-MCNC: 7.5 G/DL (ref 12–15.9)
MCH RBC QN AUTO: 29.4 PG (ref 26.6–33)
MCHC RBC AUTO-ENTMCNC: 32.1 G/DL (ref 31.5–35.7)
MCV RBC AUTO: 91.8 FL (ref 79–97)
PLATELET # BLD AUTO: 160 10*3/MM3 (ref 140–450)
PMV BLD AUTO: 11.4 FL (ref 6–12)
POTASSIUM SERPL-SCNC: 3.7 MMOL/L (ref 3.5–5.2)
RBC # BLD AUTO: 2.55 10*6/MM3 (ref 3.77–5.28)
SODIUM SERPL-SCNC: 140 MMOL/L (ref 136–145)
WBC NRBC COR # BLD: 9.15 10*3/MM3 (ref 3.4–10.8)

## 2023-05-14 PROCEDURE — 99222 1ST HOSP IP/OBS MODERATE 55: CPT | Performed by: INTERNAL MEDICINE

## 2023-05-14 PROCEDURE — 85027 COMPLETE CBC AUTOMATED: CPT | Performed by: FAMILY MEDICINE

## 2023-05-14 PROCEDURE — 80048 BASIC METABOLIC PNL TOTAL CA: CPT | Performed by: FAMILY MEDICINE

## 2023-05-14 RX ADMIN — Medication 10 ML: at 08:16

## 2023-05-14 RX ADMIN — COLLAGENASE SANTYL 1 APPLICATION: 250 OINTMENT TOPICAL at 21:07

## 2023-05-14 RX ADMIN — PANTOPRAZOLE SODIUM 40 MG: 40 INJECTION, POWDER, FOR SOLUTION INTRAVENOUS at 08:16

## 2023-05-14 RX ADMIN — Medication 10 ML: at 21:07

## 2023-05-14 RX ADMIN — COLLAGENASE SANTYL 1 APPLICATION: 250 OINTMENT TOPICAL at 08:17

## 2023-05-14 RX ADMIN — PANTOPRAZOLE SODIUM 40 MG: 40 INJECTION, POWDER, FOR SOLUTION INTRAVENOUS at 17:24

## 2023-05-14 NOTE — PROGRESS NOTES
[facility] Medicine Services  INPATIENT PROGRESS NOTE    Patient Name: Jess Ramsey  Date of Admission: 5/5/2023  Today's Date: 05/14/23  Length of Stay: 9  Primary Care Physician: David Frazier MD    Subjective   Chief Complaint: Failure to thrive.  HPI   Patient is on room air.  Blood pressure stable, afebrile.  Hemoglobin stable.  Discussed with patient waiting for rehab placement    Review of Systems   Unable to obtain due to difficult to communicate, patient is unable to talk, chronic condition.  All pertinent negatives and positives are as above. All other systems have been reviewed and are negative unless otherwise stated.     Objective    Temp:  [97.6 °F (36.4 °C)-98.2 °F (36.8 °C)] 98.2 °F (36.8 °C)  Heart Rate:  [66-78] 76  Resp:  [16] 16  BP: (130-158)/(60-87) 130/77  Physical Exam  Vitals and nursing note reviewed.   Constitutional:       Comments: Chronically ill.   Nonverbal  HENT:      Head: Normocephalic.   Eyes:      Conjunctiva/sclera: Conjunctivae normal.      Pupils: Pupils are equal, round, and reactive to light.   Neck:      Vascular: No JVD.   Cardiovascular:      Rate and Rhythm: Normal rate and regular rhythm.      Heart sounds: Normal heart sounds.   Pulmonary:      Effort: No respiratory distress.      Breath sounds: No wheezing or rales.      Comments: Patient is on room air, clear, diminished breath sound bilateral.  Clear bilateral.  Chest:      Chest wall: No tenderness.   Abdominal:      General: Bowel sounds are normal. There is no distension.      Palpations: Abdomen is soft.      Tenderness: There is no abdominal tenderness.   Musculoskeletal:         General: Contracted extremities     Cervical back: Neck supple.  Arthritis of the hand with deformity.  Patient is bedbound.    Skin:     General: Skin is warm and dry.      Capillary Refill: Capillary refill takes 2 to 3 seconds.      Findings: No rash.   Neurological:      Cranial Nerves:  No cranial nerve deficit.      Motor: Weakness present. No abnormal muscle tone.      Coordination: Coordination abnormal.      Gait: Gait abnormal.      Deep Tendon Reflexes: Reflexes normal.       Results Review:  I have reviewed the labs, radiology results, and diagnostic studies.    Laboratory Data:   Results from last 7 days   Lab Units 05/14/23 0348 05/13/23 0618 05/12/23  1225   WBC 10*3/mm3 9.15 8.84 9.66   HEMOGLOBIN g/dL 7.5* 7.9* 7.9*   HEMATOCRIT % 23.4* 26.3* 25.1*   PLATELETS 10*3/mm3 160 130* 113*        Results from last 7 days   Lab Units 05/14/23 0348 05/13/23  0618 05/12/23  1225 05/09/23  1049 05/08/23  0455   SODIUM mmol/L 140 141 144   < > 150*   POTASSIUM mmol/L 3.7 3.8 3.7   < > 3.3*   CHLORIDE mmol/L 104 107 109*   < > 117*   CO2 mmol/L 29.0 27.0 30.0*   < > 26.0   BUN mg/dL 8 9 8   < > 18   CREATININE mg/dL 0.36* 0.45* 0.49*   < > 0.45*   CALCIUM mg/dL 8.5* 8.5* 8.8   < > 8.5*   BILIRUBIN mg/dL  --   --  0.3  --  0.6   ALK PHOS U/L  --   --  71  --  60   ALT (SGPT) U/L  --   --  23  --  48*   AST (SGOT) U/L  --   --  16  --  40*   GLUCOSE mg/dL 88 99 90   < > 87    < > = values in this interval not displayed.       Culture Data:   No results found for: BLOODCX, URINECX, WOUNDCX, MRSACX, RESPCX, STOOLCX    Radiology Data:   Imaging Results (Last 24 Hours)     ** No results found for the last 24 hours. **          I have reviewed the patient's current medications.     Assessment/Plan   Assessment  Active Hospital Problems    Diagnosis    • **Septic shock    • Right lower lobe pneumonia    • Intellectual disability    • Anemia    • Thrombocytopenia    • Transaminitis    • Hypoxia    • Metabolic encephalopathy    • Acute blood loss anemia    • Hypovolemic shock        Treatment Plan  [facility] . Patient will be admitted at [facility].  She is transferred here from [facility] in [facility].  Her primary care provider had her in the hospital there for the last 2 days.   She presented with hypothermia and altered mental status.  He thought at first she was just dehydrated and then she ultimately developed a right lower lobe pneumonia and some hypoxemia after initially having clear chest imaging and a normal urinalysis.  She became hypotensive.  A central line was placed and she was started on norepinephrine and sent here.     GI bleed.  Black tarry stool resolved.  GI consult.  Hemoglobin stable.  Protonix drip.  EGD 5/6/2023- 2 subcentimeter fusiform ulcers with overlying eschar and dark adherent clot- 2 clip was placed, epinephrine was injected, tattoo ink.  Second EGD 5/9/2023- original described ulcers visualized- all clips still attached-surrounding petechiae, large also reveals blood clot has migrated and underlying ulcer revealed 3 mm visible vessels starting to epithelialize-no residual bleeding-no other high risk stigmata-no blood seen, duodenum-bulbar duodenitis-no blood seen-bile noted.  Vascular recommendation- conservative treatment.  If continued bleeding or worsening of hemoglobin and hypotension then to consider coil embolization.  General surgery recommendation- advance diet to purée, IV Protonix while in the hospital-change to p.o. Protonix twice a day until follow-up EGD, EGD in 4 to 6 weeks to confirm ulcer healing-we will arrange an office, avoid aspirin and NSAID.  Follow-up with GI 2 to 4 weeks postdischarge.     Pneumonia/patient arrives somnolent/hypothermia/altered mental status . Zosyn.  Vancomycin DC 5/6/2023 due to negative MRSA screen..  Chest x-ray-  Right-sided CVL with tip overlying the low SVC,  No pneumothorax,  Veiling RIGHT basilar opacity, likely representing small layering pleural effusion and/or atelectasis.  Patient is currently on room air.     Hypotension.  Resolved.  Levophed DC yesterday 5/6/2023.     Hypernatremia.  Resolved.     Hypokalemia.  Resolved.     Anemia.  Status post 3 units of blood transfusion.  Hemoglobin slight decrease.   No sign of acute bleed.     Thrombocytopenia.  Resolved.   Status post 1 unit of platelets transfusion.      Elevated liver enzyme.  Resolved.    Acute hepatitis panel-negative.  Ultrasound the liver-No acute abdominal finding by ultrasound, RIGHT kidney with small echogenicity- could represent nephrolithiasis or layering milk of calcium in a 1 cm otherwise simple appearing cyst, No hydronephrosis demonstrated.    Buttock wound, stage II.  Wound care consult.      History of stroke and cognitive deficit according to transfer physician.   Patient unable to provide any history.  Chronic right-sided weakness, nonverbal, patient does ambulate at baseline.     Patient from a group home in [facility].     Nutrition .  Puréed.     Deconditioning . PT and OT consult     Blood culture with SUSHIL-  no growth in 3 days.  Legionella antigen-negative.  MRSA screen-negative.  Strep pneumo-negative.       Patient transferred from [facility].   Patient is a snow of the [facility].  [phone]-[employer] [place] emergency contact, snow of [facility].   Patient lives in [facility].  At baseline patient get around a wheelchair, unable to use spoon to feed herself, she can  food with her hand and had to be spoon fed. Behavior wise baseline aggressive and yelling, this has been better with change in psych medications.      Palliative care consult . Consult  for nursing home placement.     Medical Decision Making  Number and Complexity of problems: Hypotension/pneumonia/GI bleed/anemia/failure to thrive/buttock wound/hyponatremia  Differential Diagnosis: None.     Conditions and Status        Improving.     [facility] Data  External documents reviewed: Previous note .  Cardiac tracing (EK G, telemetry) interpretation: Sinus .  Radiology interpretation: X-ray .  Labs reviewed: Laboratory  Any tests that were considered but not ordered: Lab now and in AM.     Decision  rules/scores evaluated (example [id number], Wells, etc): None     Discussed with: Nursing and patient.     Care Planning  Shared decision making: Nursing and patient  Code status and discussions: Full code.  [place]     Disposition  Social Determinants of Health that impact treatment or disposition: [place]  Pending rehab placement.  Electronically signed by Gallo Hazel MD, 05/14/23, 10:15 CDT.

## 2023-05-14 NOTE — CONSULTS
"Inpatient Cardiology Consult  Consult performed by: Jos Rojas MD  Consult ordered by: Ryne Perkins DO  Reason for consult: \"2.75 second pause w/ HR down to 24bpm @ 2320 on 5/13/23\"      Chief Complaint: Unobtainable as the patient does not communicate verbally    HPI: This is a 72-year-old female who we are asked to see regarding possible pauses on telemetry overnight.  The patient was initially admitted here on 5/5/2023.  Records indicate she was at an outside hospital with hypothermia and altered mental status, developing right lower lobe pneumonia, hypoxemia, hypotension and was thought to be in septic shock.  In addition, the patient has been described as having a GI bleed.  Multiple consultants have been involved in the patient's care.  Overnight last night, reportedly, a 2.5-second \"pause\" was identified on the monitor.  The patient is nonverbal and provides no history but does nod no when asked about being lightheaded or dizzy at any point in time.    Records indicate a prior stroke but no significant cardiac history.    All elements of the past medical history, past surgical history, medications, allergies, social history, family history and review of systems are considered unobtainable and/or of limited value given the patient's mental status and inability to communicate other than by nodding.    Physical Exam:  /77 (BP Location: Right arm, Patient Position: Lying)   Pulse 76   Temp 98.2 °F (36.8 °C) (Oral)   Resp 16   Ht 154.9 cm (61\")   Wt 59.7 kg (131 lb 11.2 oz)   SpO2 95%   BMI 24.88 kg/m²   Temp:  [97.6 °F (36.4 °C)-98.2 °F (36.8 °C)] 98.2 °F (36.8 °C)  Heart Rate:  [66-78] 76  Resp:  [16] 16  BP: (130-158)/(60-87) 130/77    General: Ill in appearance, lying in bed, awake and alert, nods appropriately to questions  HEENT: Normocephalic, atraumatic pupils equal, round, reactive, oropharynx clear with dry mucous membranes  Neck: No obvious elevation of JVP  CV: Regular " rate and rhythm at this time with no audible murmurs  Pulmonary: Clear to auscultation, poor effort but otherwise no wheezes, rhonchi, rales  GI: Soft, nontender, nondistended, active bowel sounds  Extremities: Warm and well-perfused, no significant peripheral edema  Neuro: The patient does seem to nod appropriately to questions but does not verbalize.    Diagnostic Data:    Lab Results   Component Value Date    WBC 9.15 05/14/2023    HGB 7.5 (L) 05/14/2023    HCT 23.4 (L) 05/14/2023    MCV 91.8 05/14/2023     05/14/2023     Lab Results   Component Value Date    GLUCOSE 88 05/14/2023    CALCIUM 8.5 (L) 05/14/2023     05/14/2023    K 3.7 05/14/2023    CO2 29.0 05/14/2023     05/14/2023    BUN 8 05/14/2023    CREATININE 0.36 (L) 05/14/2023    EGFR 107.3 05/14/2023    BCR 22.2 05/14/2023    ANIONGAP 7.0 05/14/2023         ECG on arrival on 5/5/2023: Sinus rhythm with PACs noted, ventricular rate at that time 92 bpm, no specific ST changes    ASSESSMENT/PLAN:    1.  Sinus arrhythmia  2.  Septic shock, now resolved  3.  Right lower lobe pneumonia  4.  Anemia  5.  Melena, present on arrival  6.  Prior stroke/cognitive deficit    -We are asked to see this patient regarding a possible pause on telemetry.  I reviewed multiple days worth of telemetry strips.  The patient does definitely demonstrate sinus arrhythmia but I cannot find any evidence of prolonged pauses.  Even the event last night labeled as a 2.75-second pause is simply sinus arrhythmia.  There are no dropped beats or significant pause, only sinus arrhythmia.  I am not certain how this telemetry strip came to light as there are other strips with more evidence of sinus arrhythmia with longer RR intervals in the one referenced last night, however the patient does not show any evidence of hemodynamic instability.  She is nonverbal therefore may not be able to verbalize lightheadedness or dizziness but very clearly shakes her head no when asked if  she was lightheaded or dizzy overnight.  -No further cardiac work-up would be indicated in this patient who has asymptomatic sinus arrhythmia but no true pauses.  She is bradycardic at times therefore would recommend avoiding any AV mode blocking agents although it does not appear that the patient has received any.  -Please feel free to call if we can assist further.  No further cardiac work-up needed at this time.

## 2023-05-14 NOTE — PLAN OF CARE
Goal Outcome Evaluation:           Progress: no change  Outcome Evaluation: Pt slept intermittently throughout night, she tries to make needs known with hand gestures but is unable to speak or write to communicate needs but can nod yes or no when asked a question. When asked if she was hungry she nodded her head yes, chocolate boost shake provided and pt was very pleased with it, will pass on to dietary. CHG bath/catheter care completed, chronic villagran need cont, good output, saida colored urine with traces of blood. NSR 64-91, pt had 2.75 second pause w/ HR down to 24bpm @ 2230, pt was sleeping, asymptomatic. Wound care completed on R buttock, appears to be healing well. Turned q2hrs. Safety maintained. O2 sat stable on RA, VSS. Awaiting placement to SNF. Will continue to monitor.

## 2023-05-15 PROBLEM — K25.0 ACUTE GASTRIC ULCER WITH HEMORRHAGE: Status: ACTIVE | Noted: 2023-05-15

## 2023-05-15 PROBLEM — K92.1 GASTROINTESTINAL HEMORRHAGE WITH MELENA: Status: ACTIVE | Noted: 2023-05-15

## 2023-05-15 PROBLEM — R57.8 HEMORRHAGIC SHOCK: Status: ACTIVE | Noted: 2023-05-05

## 2023-05-15 LAB
ANION GAP SERPL CALCULATED.3IONS-SCNC: 8 MMOL/L (ref 5–15)
BUN SERPL-MCNC: 9 MG/DL (ref 8–23)
BUN/CREAT SERPL: 22 (ref 7–25)
CALCIUM SPEC-SCNC: 8.7 MG/DL (ref 8.6–10.5)
CHLORIDE SERPL-SCNC: 102 MMOL/L (ref 98–107)
CO2 SERPL-SCNC: 29 MMOL/L (ref 22–29)
CREAT SERPL-MCNC: 0.41 MG/DL (ref 0.57–1)
DEPRECATED RDW RBC AUTO: 51.4 FL (ref 37–54)
EGFRCR SERPLBLD CKD-EPI 2021: 104 ML/MIN/1.73
ERYTHROCYTE [DISTWIDTH] IN BLOOD BY AUTOMATED COUNT: 15.6 % (ref 12.3–15.4)
GLUCOSE SERPL-MCNC: 106 MG/DL (ref 65–99)
HCT VFR BLD AUTO: 24.9 % (ref 34–46.6)
HGB BLD-MCNC: 8 G/DL (ref 12–15.9)
MCH RBC QN AUTO: 28.7 PG (ref 26.6–33)
MCHC RBC AUTO-ENTMCNC: 32.1 G/DL (ref 31.5–35.7)
MCV RBC AUTO: 89.2 FL (ref 79–97)
PLATELET # BLD AUTO: 196 10*3/MM3 (ref 140–450)
PMV BLD AUTO: 11.7 FL (ref 6–12)
POTASSIUM SERPL-SCNC: 3.5 MMOL/L (ref 3.5–5.2)
RBC # BLD AUTO: 2.79 10*6/MM3 (ref 3.77–5.28)
SODIUM SERPL-SCNC: 139 MMOL/L (ref 136–145)
WBC NRBC COR # BLD: 10.01 10*3/MM3 (ref 3.4–10.8)

## 2023-05-15 PROCEDURE — 97530 THERAPEUTIC ACTIVITIES: CPT

## 2023-05-15 PROCEDURE — 80048 BASIC METABOLIC PNL TOTAL CA: CPT | Performed by: FAMILY MEDICINE

## 2023-05-15 PROCEDURE — 85027 COMPLETE CBC AUTOMATED: CPT | Performed by: FAMILY MEDICINE

## 2023-05-15 PROCEDURE — 97110 THERAPEUTIC EXERCISES: CPT

## 2023-05-15 RX ORDER — RISPERIDONE 1 MG/1
1 TABLET ORAL 2 TIMES DAILY
Status: DISCONTINUED | OUTPATIENT
Start: 2023-05-15 | End: 2023-05-16 | Stop reason: HOSPADM

## 2023-05-15 RX ORDER — ASPIRIN 81 MG/1
81 TABLET, CHEWABLE ORAL DAILY
Status: DISCONTINUED | OUTPATIENT
Start: 2023-05-15 | End: 2023-05-16 | Stop reason: HOSPADM

## 2023-05-15 RX ORDER — TRAZODONE HYDROCHLORIDE 50 MG/1
25 TABLET ORAL NIGHTLY
Status: DISCONTINUED | OUTPATIENT
Start: 2023-05-15 | End: 2023-05-16 | Stop reason: HOSPADM

## 2023-05-15 RX ORDER — AMLODIPINE BESYLATE 5 MG/1
5 TABLET ORAL DAILY
Status: DISCONTINUED | OUTPATIENT
Start: 2023-05-15 | End: 2023-05-15

## 2023-05-15 RX ORDER — PANTOPRAZOLE SODIUM 40 MG/1
40 TABLET, DELAYED RELEASE ORAL
Status: DISCONTINUED | OUTPATIENT
Start: 2023-05-15 | End: 2023-05-16 | Stop reason: HOSPADM

## 2023-05-15 RX ORDER — ATORVASTATIN CALCIUM 10 MG/1
10 TABLET, FILM COATED ORAL NIGHTLY
Status: DISCONTINUED | OUTPATIENT
Start: 2023-05-15 | End: 2023-05-16 | Stop reason: HOSPADM

## 2023-05-15 RX ADMIN — RISPERIDONE 1 MG: 1 TABLET, FILM COATED ORAL at 11:49

## 2023-05-15 RX ADMIN — ASPIRIN 81 MG: 81 TABLET, CHEWABLE ORAL at 11:49

## 2023-05-15 RX ADMIN — TRAZODONE HYDROCHLORIDE 25 MG: 50 TABLET ORAL at 20:59

## 2023-05-15 RX ADMIN — COLLAGENASE SANTYL 1 APPLICATION: 250 OINTMENT TOPICAL at 20:59

## 2023-05-15 RX ADMIN — Medication 10 ML: at 08:32

## 2023-05-15 RX ADMIN — RISPERIDONE 1 MG: 1 TABLET, FILM COATED ORAL at 20:58

## 2023-05-15 RX ADMIN — PANTOPRAZOLE SODIUM 40 MG: 40 TABLET, DELAYED RELEASE ORAL at 18:15

## 2023-05-15 RX ADMIN — ATORVASTATIN CALCIUM 10 MG: 10 TABLET, FILM COATED ORAL at 20:58

## 2023-05-15 RX ADMIN — COLLAGENASE SANTYL 1 APPLICATION: 250 OINTMENT TOPICAL at 08:32

## 2023-05-15 RX ADMIN — PANTOPRAZOLE SODIUM 40 MG: 40 INJECTION, POWDER, FOR SOLUTION INTRAVENOUS at 08:32

## 2023-05-15 NOTE — PLAN OF CARE
Goal Outcome Evaluation:              Outcome Evaluation: Pt alert most of the night. Communicated with pt asking yes and no questions. Pt's dinner at bedside beginning of shift, fed pt, 100% consumed. Pt turned Q2hrs. VSS on RA, HR S 69-93 with pauses noted per tele, pt asymptomatic. Santyl applied to wound, CHG bath/catheter care completed. Chronic villagran, clear yellow with good output overnight. Safety maintained.

## 2023-05-15 NOTE — PROGRESS NOTES
[facility] Medicine Services  INPATIENT PROGRESS NOTE    Patient Name: Jess Ramsey  Date of Admission: 5/5/2023  Today's Date: 05/15/23  Length of Stay: 10  Primary Care Physician: David Frazier MD    Subjective   Chief Complaint: Recent GI bleeding.  HPI   I last saw the patient on 5/5 when she was admitted in transfer from [facility].  At presentation, the main thought was that she was experiencing septic shock related to a right lower lobe pneumonia.  However, shortly after presenting her, she had a large melanotic stool and had a hemoglobin of less than 7.  Her BUN was also elevated.  She was felt to have hemorrhagic shock.  She was transfused blood and has undergone endoscopy twice with Dr. Villegas.    She has since stabilized and has been on the floor recently.  She is actually waiting for precertification to go to skilled nursing in [place].    She remains poorly communicative, which is her baseline.  She has a state guardian.    She needs her right IJ CVC removed today.  It has been and since she was transferred here.    Review of Systems   All pertinent negatives and positives are as above. All other systems have been reviewed and are negative unless otherwise stated.     Objective    Temp:  [97.8 °F (36.6 °C)-98.9 °F (37.2 °C)] 97.8 °F (36.6 °C)  Heart Rate:  [72-80] 80  Resp:  [16-17] 17  BP: (118-152)/(59-92) 118/90  Physical Exam  Constitutional:       Comments: Up in the chair.    HENT:      Head: Normocephalic and atraumatic.      Mouth/Throat:      Mouth: Mucous membranes are dry.   Eyes:      Conjunctiva/sclera: Conjunctivae normal.      Pupils: Pupils are equal, round, and reactive to light.   Neck:      Vascular: No JVD.   Cardiovascular: NSR.      Rate and Rhythm: Normal rate and regular rhythm.      Heart sounds: Normal heart sounds.   Pulmonary:      Breath sounds: No rales or rhonchi present. No wheezing.      Comments: On room air.    Chest:      Chest wall: No tenderness.   Abdominal:      General: Bowel sounds are normal. There is no distension.      Palpations: Abdomen is soft.      Tenderness: There is no abdominal tenderness.   Musculoskeletal: Right IJ CVC placed prior to transfer.        General: No tenderness or deformity.      Cervical back: Neck supple.   Skin:     General: Skin is warm and dry.      Findings: No rash.   Neurological:      Mental Status: She is disoriented.      Motor: Weakness present. No abnormal muscle tone.      Comments: Drowsy and weak, which is her baseline.     Intake/Output Summary (Last 24 hours) at 5/15/2023 0957  Last data filed at 5/15/2023 0600  Gross per 24 hour   Intake 240 ml   Output 1925 ml   Net -1685 ml     Results Review:  I have reviewed the labs, radiology results, and diagnostic studies.    Laboratory Data:   Results from last 7 days   Lab Units 05/15/23  0420 05/14/23 0348 05/13/23  0618   WBC 10*3/mm3 10.01 9.15 8.84   HEMOGLOBIN g/dL 8.0* 7.5* 7.9*   HEMATOCRIT % 24.9* 23.4* 26.3*   PLATELETS 10*3/mm3 196 160 130*     Results from last 7 days   Lab Units 05/15/23  0420 05/14/23  0348 05/13/23  0618 05/12/23  1225   SODIUM mmol/L 139 140 141 144   POTASSIUM mmol/L 3.5 3.7 3.8 3.7   CHLORIDE mmol/L 102 104 107 109*   CO2 mmol/L 29.0 29.0 27.0 30.0*   BUN mg/dL 9 8 9 8   CREATININE mg/dL 0.41* 0.36* 0.45* 0.49*   CALCIUM mg/dL 8.7 8.5* 8.5* 8.8   BILIRUBIN mg/dL  --   --   --  0.3   ALK PHOS U/L  --   --   --  71   ALT (SGPT) U/L  --   --   --  23   AST (SGOT) U/L  --   --   --  16   GLUCOSE mg/dL 106* 88 99 90     I have reviewed the patient's current medications.     Assessment/Plan   Assessment  Active Hospital Problems    Diagnosis    • **Hemorrhagic shock    • Acute gastric ulcer with hemorrhage    • Gastrointestinal hemorrhage with melena    • Acute blood loss anemia    • Right lower lobe pneumonia    • Hypoxia    • Thrombocytopenia    • Metabolic encephalopathy    • Intellectual  disability    • Transaminitis      Treatment Plan  The patient was admitted to my service here at [facility] on 5/5.   She has subsequently been cared for by Dr. Hazel since that point in time. She was transferred here from [facility] in [facility]. Her primary care provider had her in the hospital there for 2 days prior to transfer.  She had initially presented with hypothermia and altered mental status.  He thought at first she was just dehydrated and then she ultimately developed a right lower lobe pneumonia and some hypoxemia after initially having clear chest imaging and a normal urinalysis.  She became hypotensive.  A central line was placed and she was started on norepinephrine and sent here.     Lactate and procalcitonin were normal.  MRSA nasal screen was negative.  Streptococcal/Legionella urinary antigens were negative.  Unable to produce sputum.  She was treated initially with vancomycin and Zosyn.  Efforts were made at pulmonary toilet.  She has received inhaled bronchodilators.  She has been weaned from oxygen to room air at this point.     She ended up having melena and her hemoglobin came back at 6.7 when first checked here.  She also had a BUN of 56.  Concerns were for an upper GI bleed.  She was started on pantoprazole.  GI was consulted.  She was taken on 5/6 for EGD by Dr. Villegas.  She had gastric ulcers with adherent clot.  These were clipped and injected with epinephrine.  She was taken back on 5/9 and had more clips placed as one of the ulcers had ultimately revealed an underlying visible vessel.  She received a total of 3 units of packed red blood cells.  She has not required blood since 5/6.  Hemoglobin 8.0 this morning after being 7.5 yesterday.     Speech therapy cleared for puréed foods with thin liquids.       She had a mild transaminitis at transfer, which resolved.      She was mildly thrombocytopenic at transfer and this was likely secondary to  consumption from her bleeding.  This has resolved.       SCDs for DVT prophylaxis.     Medical Decision Making  Number and Complexity of problems: 1 acute, highly complex problem in the form of her presumed septic shock caused by a presumed right lower lobe pneumonia at transfer.  However, her condition turned out to be an upper gastrointestinal bleed that was causing hemorrhagic shock.  Differential Diagnosis:  None considered at present.     Conditions and Status        Condition has improved.     [facility] Data  External documents reviewed: Reviewed records from [facility] at presentation.   Cardiac tracing (EKG, telemetry) interpretation: NSR.  Cardiology recently saw for pauses.  They diagnosed her with sinus arrhythmia and recommended she not be placed back on carvedilol.   Radiology interpretation:  Previously interpreted by radiology.  Labs reviewed: As above.  Any tests that were considered but not ordered:  None considered at present.     Decision rules/scores evaluated (example [id number], [place], etc): None considered at present.     Discussed with: Discussed with her nurse (Ayde).     Care Planning  Shared decision making: Unable to consent to anything.  Her transfer and admission here was approved by her state guardian.  Code status and discussions: Full with full interventions according to her state guardian.  Her primary care provider discussed her case with them prior to transfer.     Disposition  Social Determinants of Health that impact treatment or disposition: Lives in a group home with an intellectual disability.  [place] [facility] the [facility].  I expect the patient to be discharged to [facility] when precertification goes through.      Electronically signed by David Laird DO, 05/15/23, 09:43 CDT.

## 2023-05-15 NOTE — CASE MANAGEMENT/SOCIAL WORK
Continued Stay Note   Asaf     Patient Name: Jess Ramsey  MRN: [id number]  Today's Date: 5/15/2023    Admit Date: 5/5/2023    Plan: [facility] ([place], [place])   Discharge Plan     Row Name 05/15/23 1445       Plan    Plan [facility] ([place], [place])    Plan Comments Jacob, DON at [facility], has called to inform SW that patient is accepted at this facility and can admit there tomorrow, 5/16. Jacob says that patient's PCP has privileges at [facility] and is aware of plan for patient to go there. SW called patient's guardian, Venita Sofia, and informed her of bed offer. SW updated Dr. Laird. Anticipate discharge tomorrow, 5/16, to SNF.                    Expected Discharge Date and Time     Expected Discharge Date Expected Discharge Time    May 16, 2023             EMMANUELLE Molina

## 2023-05-15 NOTE — PLAN OF CARE
"Goal Outcome Evaluation:  Plan of Care Reviewed With: patient        Progress: improving  Outcome Evaluation: pt with approx 25% command following with repeated cues and demo at times. Pt demo'd difficulty with coordnitation in BUEs and decreased ROM/strength which is limiting her ADL performance and ability to hold grooming/feeding items. Performed balance activities of reaching outside MARLON and maintaining midline after balance challanges. Pt required initiation of movements by VAIL/L of reaching with BUEs unilaterally. Pt has difficulty following cues and sequencing tasks for ther ex performance. pt up in chair, declined \"no\" and head shake to getting BTB. When asked if she wanted to stay up in chair longer head shake of yes. Provided support to L side d/t lateral lean and elevated BUEs/LEs for skin protection. Recommend SNF at d/c.         "

## 2023-05-15 NOTE — THERAPY TREATMENT NOTE
Acute Care - Physical Therapy Treatment Note  [facility]     Patient Name: Jess Ramsey  : 1950  MRN: [id number]  Today's Date: 5/15/2023      Visit Dx:     ICD-10-CM ICD-9-CM   1. Acute blood loss anemia  D62 285.1   2. Hypovolemic shock  R57.1 785.59   3. Oral phase dysphagia  R13.11 787.21   4. Acute gastric ulcer with hemorrhage  K25.0 531.00   5. Impaired mobility and ADLs  Z74.09 V49.89    Z78.9    6. Impaired mobility  Z74.09 799.89     Patient Active Problem List   Diagnosis   • Right lower lobe pneumonia   • Intellectual disability   • Thrombocytopenia   • Transaminitis   • Hypoxia   • Metabolic encephalopathy   • Acute blood loss anemia   • Hemorrhagic shock   • Acute gastric ulcer with hemorrhage   • Gastrointestinal hemorrhage with melena     Past Medical History:   Diagnosis Date   • Gastritis    • Hypertension    • Intellectual disability    • Psychosis    • Right hemiparesis    • Stroke      Past Surgical History:   Procedure Laterality Date   • ENDOSCOPY N/A 2023    Procedure: ESOPHAGOGASTRODUODENOSCOPY WITH ANESTHESIA;  Surgeon: Sherif Villegas MD;  Location: [facility] OR;  Service: Gastroenterology;  Laterality: N/A;  PRE GI BLEED  POST gastric ulcers,ink and clip   • ENDOSCOPY N/A 2023    Procedure: ESOPHAGOGASTRODUODENOSCOPY WITH ANESTHESIA;  Surgeon: Sherif Villegas MD;  Location: [facility] ENDOSCOPY;  Service: Gastroenterology;  Laterality: N/A;  Pre: Anemia  Post: Gastric ulcers  David Frazier MD   • HIP SURGERY Right 2019     PT Assessment (last 12 hours)     PT Evaluation and Treatment     Row Name 05/15/23 0835          Physical Therapy Time and Intention    Subjective Information --  -TB     Document Type therapy note (daily note)  -TB     Mode of Treatment physical therapy  -TB     Row Name 05/15/23 0835          General Information    Existing Precautions/Restrictions fall  -TB     Row Name 05/15/23 0835          Bed Mobility    Bed Mobility scooting/bridging;supine-sit   -TB     Scooting/Bridging Meagher (Bed Mobility) maximum assist (25% patient effort);verbal cues  -TB     Supine-Sit Meagher (Bed Mobility) maximum assist (25% patient effort);verbal cues  -TB     Assistive Device (Bed Mobility) head of bed elevated  -TB     Row Name 05/15/23 0835          Transfers    Transfers sit-stand transfer;stand-sit transfer;bed-chair transfer  -TB     Row Name 05/15/23 0835          Bed-Chair Transfer    Bed-Chair Meagher (Transfers) moderate assist (50% patient effort);2 person assist;verbal cues  -TB     Assistive Device (Bed-Chair Transfers) other (see comments)  HHAx2  -TB     Comment, (Bed-Chair Transfer) Cues for safety  -TB     Row Name 05/15/23 0835          Sit-Stand Transfer    Sit-Stand Meagher (Transfers) moderate assist (50% patient effort);2 person assist;verbal cues  -TB     Assistive Device (Sit-Stand Transfers) other (see comments)  HHAx2  -TB     Comment, (Sit-Stand Transfer) Stood x2  -TB     Row Name 05/15/23 0835          Stand-Sit Transfer    Stand-Sit Meagher (Transfers) moderate assist (50% patient effort);2 person assist;verbal cues  -TB     Assistive Device (Stand-Sit Transfers) other (see comments)  HHAx2  -TB     Row Name 05/15/23 0835          Balance    Balance Assessment sitting static balance;sitting dynamic balance  -TB     Static Sitting Balance contact guard;verbal cues  -TB     Dynamic Sitting Balance contact guard;verbal cues  -TB     Comment, Balance Cues for posture during activity. Post lean w/ dynamic activity  -TB     Row Name 05/15/23 0835          Motor Skills    Comments, Therapeutic Exercise AROM-AAROM BLE x15  Increased time needed. Post lean w/ activity.  -TB     Row Name             Wound 05/05/23 2107 gluteal    Wound - Properties Group Placement Date: 05/05/23 -SY Placement Time: 2107 -SY Location: gluteal  -SY    Retired Wound - Properties Group Placement Date: 05/05/23 -SY Placement Time: 2107 -SY Location:  gluteal  -SY    Retired Wound - Properties Group Date first assessed: 05/05/23  -SY Time first assessed: 2107 -SY Location: gluteal  -SY    Row Name 05/15/23 0835          Plan of Care Review    Plan of Care Reviewed With patient  -TB     Progress improving  -TB     Outcome Evaluation Pt in bed agreeable to tx. Asked pt if she'd like to get to EOB today and she excitedly shook her head. Required MAX to get to EOB. Pt started to move LEs to edge w/ lots of cues. Once EOB pt needed CGA to maintain balance. Performed AROM-AAROM BLE x15 w/ MAX cues to maintain focus. Stood Mod x2 w/ HHA. Stood ~20 secs w/ cues for posture. Seated rest before standing again and taking 2 steps to the chair. Pt started to sit too earlu needing MAX to redirect and get to the chair safely. Max x2 ro scoot back in chair. Pt w/ L lateral lean in the chair so pt was supported upright. Will cont POC.  -TB     Row Name 05/15/23 0835          Positioning and Restraints    Pre-Treatment Position in bed  -TB     Post Treatment Position chair  -TB     In Chair notified nsg;reclined;sitting;call light within reach  -TB           User Key  (r) = Recorded By, (t) = Taken By, (c) = Cosigned By    Initials Name Provider Type    TB Toni Hoffman, PTA Physical Therapist Assistant    Clemente Caldera, RN Registered Nurse                Physical Therapy Education     Title: PT OT SLP Therapies (In Progress)     Topic: Physical Therapy (In Progress)     Point: Mobility training (In Progress)     Learning Progress Summary           Patient Nonacceptance, E, NL by SB at 5/10/2023 1610    Comment: pt edu on POC, benefits of act and d/c plans                   Point: Home exercise program (Not Started)     Learner Progress:  Not documented in this visit.          Point: Body mechanics (Not Started)     Learner Progress:  Not documented in this visit.          Point: Precautions (In Progress)     Learning Progress Summary           Patient Nonacceptance, E,  NL by JANIS at 5/10/2023 1610    Comment: pt edu on POC, benefits of act and d/c plans                               User Key     Initials Effective Dates Name Provider Type Discipline    SB 06/16/21 -  Kala Rodriguez, PT DPT Physical Therapist PT              PT Recommendation and Plan     Plan of Care Reviewed With: patient  Progress: improving  Outcome Evaluation: Pt in bed agreeable to tx. Asked pt if she'd like to get to EOB today and she excitedly shook her head. Required MAX to get to EOB. Pt started to move LEs to edge w/ lots of cues. Once EOB pt needed CGA to maintain balance. Performed AROM-AAROM BLE x15 w/ MAX cues to maintain focus. Stood Mod x2 w/ HHA. Stood ~20 secs w/ cues for posture. Seated rest before standing again and taking 2 steps to the chair. Pt started to sit too earlu needing MAX to redirect and get to the chair safely. Max x2 ro scoot back in chair. Pt w/ L lateral lean in the chair so pt was supported upright. Will cont POC.   Outcome Measures     Row Name 05/15/23 0835             How much help from another person do you currently need...    Turning from your back to your side while in flat bed without using bedrails? 2  -TB      Moving from lying on back to sitting on the side of a flat bed without bedrails? 2  -TB      Moving to and from a bed to a chair (including a wheelchair)? 2  -TB      Standing up from a chair using your arms (e.g., wheelchair, bedside chair)? 2  -TB      Climbing 3-5 steps with a railing? 1  -TB      To walk in hospital room? 1  -TB      AM-PAC 6 Clicks Score (PT) 10  -TB         Functional Assessment    Outcome Measure Options AM-PAC 6 Clicks Basic Mobility (PT)  -TB            User Key  (r) = Recorded By, (t) = Taken By, (c) = Cosigned By    Initials Name Provider Type    TB Toni Hoffman, PTA Physical Therapist Assistant                 Time Calculation:    PT Charges     Row Name 05/15/23 1338             Time Calculation    Start Time 0835  -TB       Stop Time 0914  -TB      Time Calculation (min) 39 min  -TB      PT Received On 05/15/23  -TB         Time Calculation- PT    Total Timed Code Minutes- PT 39 minute(s)  -TB            User Key  (r) = Recorded By, (t) = Taken By, (c) = Cosigned By    Initials Name Provider Type    TB Toni Hoffman PTA Physical Therapist Assistant              Therapy Charges for Today     Code Description Service Date Service Provider Modifiers Qty    60886013762 HC PT THER PROC EA 15 MIN 5/15/2023 Toni Hoffman PTA GP 1    96684912293 HC PT THERAPEUTIC ACT EA 15 MIN 5/15/2023 Toni Hoffman PTA GP 2          PT G-Codes  Outcome Measure Options: AM-PAC 6 Clicks Basic Mobility (PT)  AM-PAC 6 Clicks Score (PT): 10  AM-PAC 6 Clicks Score (OT): 6    Toni Hoffman PTA  5/15/2023

## 2023-05-15 NOTE — CASE MANAGEMENT/SOCIAL WORK
Continued Stay Note   [place]     Patient Name: Jess Ramsey  MRN: [id number]  Today's Date: 5/15/2023    Admit Date: 5/5/2023    Plan: [facility] - Pending Insurance   Discharge Plan     Row Name 05/15/23 0901       Plan    Plan [facility] - Pending Insurance    Plan Comments Contacted Aliyah Barger to check status of insurance approval / PASSR approval. Aliyah says that she has a meeting at 9:15 and may have an update after that. Will await update from [facility] re admission.                 EMMANUELLE Molina

## 2023-05-15 NOTE — DISCHARGE PLACEMENT REQUEST
"Call back: 4B  [phone]    Myla Ramsey (72 y.o. Female)     Date of Birth   1950    Social Security Number       Address   [address] [facility] [address]    Home Phone   [phone]    MRN   [id number]       Faith   Other    Marital Status   Single                            Admission Date   5/5/23    Admission Type   Urgent    Admitting Provider   David Laird DO    Attending Provider   David Laird DO    Department, Room/Bed   [facility] 4B, 449/1       Discharge Date       Discharge Disposition       Discharge Destination                               Attending Provider: David Laird DO    Allergies: No Known Allergies    Isolation: None   Infection: None   Code Status: CPR    Ht: 154.9 cm (61\")   Wt: 59.1 kg (130 lb 6.4 oz)    Admission Cmt: None   Principal Problem: Hemorrhagic shock [R57.8]                 Active Insurance as of 5/5/2023     Primary Coverage     Payor Plan Insurance Group Employer/Plan Group    MEDICARE MEDICARE A & B      Payor Plan Address Payor Plan Phone Number Payor Plan Fax Number Effective Dates    [address] [phone]  9/1/1982 - None Entered    [facility] [id number]       Subscriber Name Subscriber Birth Date Member ID       MYLA RAMSEY 1950 [id number]           Secondary Coverage     Payor Plan Insurance Group Employer/Plan Group    [facility] MEDICAID      Payor Plan Address Payor Plan Phone Number Payor Plan Fax Number Effective Dates    [address] [address] [phone]  5/5/2023 - None Entered    [facility] [id number]       Subscriber Name Subscriber Birth Date Member ID       MYLA RAMSEY 1950 [id number]                 Emergency Contacts      (Rel.) Home Phone Work Phone Mobile Phone    meaghan mercer (Legal Guardian) -- -- [phone]            Insurance Information                MEDICARE/MEDICARE A & B Phone: [phone]    Subscriber: Myla Ramsey Subscriber#: " [id number]    Group#: -- Precert#: --        [place] Phone: [phone]    Subscriber: Jess Ramsey Subscriber#: [id number]    Group#: -- Precert#: --             History & Physical      Sherif Villegas MD at 05/09/23 1159          H&P reviewed. The patient was examined and there are no changes to the H&P.          Electronically signed by Sherif Villegas MD at 05/09/23 1159   Source Note                [facility] Gastroenterology  Inpatient Progress Note  Today's date:  05/08/23    Jess Braulio  1950       Reason for Follow Up:    1.  Upper GI bleed  2.  Gastric ulcer    Subjective:   Patient much more docile today.  Minimal dark stool overnight.    No Known Allergies    Current Facility-Administered Medications:   •  acetaminophen (TYLENOL) tablet 650 mg, 650 mg, Oral, Q4H PRN **OR** acetaminophen (TYLENOL) 160 MG/5ML solution 650 mg, 650 mg, Oral, Q4H PRN **OR** acetaminophen (TYLENOL) suppository 650 mg, 650 mg, Rectal, Q4H PRN, Gallo Hazel MD  •  Chlorhexidine Gluconate Cloth 2 % pads 1 application, 1 application, Topical, Q24H, Gallo Hazel MD, 1 application at 05/08/23 0349  •  collagenase ointment 1 application, 1 application, Topical, Q12H, Estrella Valdez APRN  •  dextrose 5 % with KCl 20 mEq/L infusion, 75 mL/hr, Intravenous, Continuous, Gallo Hazel MD, Last Rate: 75 mL/hr at 05/08/23 1418, 75 mL/hr at 05/08/23 1418  •  ipratropium-albuterol (DUO-NEB) nebulizer solution 3 mL, 3 mL, Nebulization, Q6H PRN, Gallo Hazel MD  •  Morphine sulfate (PF) injection 1 mg, 1 mg, Intravenous, Q4H PRN, Gallo Hazel MD, 1 mg at 05/07/23 0016  •  mupirocin (BACTROBAN) 2 % nasal ointment 1 application, 1 application, Each Nare, BID, Gallo Hazel MD, 1 application at 05/08/23 0916  •  ondansetron (ZOFRAN) injection 4 mg, 4 mg, Intravenous, Q6H PRN, Gallo Hazel MD  •  pantoprazole (PROTONIX) 40 mg in 100mL NS IVPB, 8 mg/hr, Intravenous, Continuous,  Gallo Hazel MD, Last Rate: 20 mL/hr at 05/08/23 1227, 8 mg/hr at 05/08/23 1227  •  Pharmacy to Dose Zosyn, , Does not apply, Continuous PRN, Gallo Hazel MD  •  piperacillin-tazobactam (ZOSYN) 4.5 g in iso-osmotic dextrose 100 mL IVPB (premix), 4.5 g, Intravenous, Q8H, Gallo Hazel MD, 4.5 g at 05/08/23 1230  •  potassium chloride (MICRO-K) CR capsule 40 mEq, 40 mEq, Oral, Once, Gallo Hazel MD  •  sodium chloride 0.9 % flush 10 mL, 10 mL, Intravenous, Q12H, Gallo Hazel MD, 10 mL at 05/08/23 0916  •  sodium chloride 0.9 % flush 10 mL, 10 mL, Intravenous, PRN, Gallo Hazel MD  •  sodium chloride 0.9 % infusion 40 mL, 40 mL, Intravenous, PRN, Gallo Hazel MD  •  ziprasidone (GEODON) injection 10 mg, 10 mg, Intramuscular, Q12H PRN, Gallo Hazel MD, 10 mg at 05/07/23 0258    Review of Systems:   Review of Systems   All other systems reviewed and are negative.  Unable to obtain due to mental status; patient minimally verbal; difficult to communicate  Vital Signs:  Temp:  [96.9 °F (36.1 °C)-97.7 °F (36.5 °C)] 97.6 °F (36.4 °C)  Heart Rate:  [75-84] 79  Resp:  [17-22] 18  BP: ()/() 135/72  Body mass index is 24.83 kg/m².     Intake/Output Summary (Last 24 hours) at 5/8/2023 1830  Last data filed at 5/8/2023 1200  Gross per 24 hour   Intake 1531.6 ml   Output 655 ml   Net 876.6 ml     I/O this shift:  In: 600.6 [I.V.:485.6; IV Piggyback:115]  Out: 300 [Urine:300]  Physical Exam:  Physical Exam  CV-RRR  Lung-good expansion bilaterally  ABD-benign      Results Review:   I have reviewed all of the patient's current test results    Results from last 7 days   Lab Units 05/08/23  0401 05/07/23  1547 05/07/23  1156 05/07/23  0537 05/06/23  0834 05/06/23  0400   WBC 10*3/mm3 7.40  --   --  8.56  --  8.66   HEMOGLOBIN g/dL 9.5* 9.7* 9.7* 8.2*   < > 10.0*   HEMATOCRIT % 28.4* 28.7* 28.0* 23.4*   < > 30.4*   PLATELETS 10*3/mm3 77*  --   --  84*  --  59*    < > = values in this interval not  displayed.       Results from last 7 days   Lab Units 05/08/23  0455 05/07/23  0537 05/06/23  0400 05/05/23 2000   SODIUM mmol/L 150* 153* 147* 144   POTASSIUM mmol/L 3.3* 3.1* 3.6 3.9   CHLORIDE mmol/L 117* 123* 120* 114*   CO2 mmol/L 26.0 26.0 19.0* 25.0   BUN mg/dL 18 31* 52* 56*   CREATININE mg/dL 0.45* 0.51* 0.62 0.83   CALCIUM mg/dL 8.5* 8.3* 7.5* 7.8*   BILIRUBIN mg/dL 0.6 0.7  --  0.3   ALK PHOS U/L 60 51  --  53   ALT (SGPT) U/L 48* 42*  --  54*   AST (SGOT) U/L 40* 39*  --  39*   GLUCOSE mg/dL 87 78 156* 145*       Results from last 7 days   Lab Units 05/07/23  1156 05/06/23  0839   INR  1.09 1.38*       Lab Results   Lab Value Date/Time    LIPASE 25 08/14/2020 0712    LIPASE 16 07/21/2019 2134       Radiology Review:  Imaging Results (Last 24 Hours)     ** No results found for the last 24 hours. **          Impression/Plan:  Patient Active Problem List   Diagnosis Code   • Septic shock A41.9, R65.21   • Right lower lobe pneumonia J18.9   • Intellectual disability F79   • Anemia D64.9   • Thrombocytopenia D69.6   • Transaminitis R74.01   • Hypoxia R09.02   • Metabolic encephalopathy G93.41   • Acute blood loss anemia D62   • Hypovolemic shock R57.1     UGI bleed/secondary to gastric ulcer.  Status post epinephrine/Hemoclip with moderate success.  No further bleeding with stable Hgb.       Vascular surgery evaluation appreciated     -Continue aggressive supportive care  -PPI  -Advance to full liquids  -Relook EGD tomorrow  -Follow-up H. pylori antibody    Sherif Villegas MD  05/08/23  18:30 CDT    Electronically signed by Sherif Villegas MD at 05/08/23 1834             Sherif Villegas MD at 05/08/23 1830                [facility] Gastroenterology  Inpatient Progress Note  Today's date:  05/08/23    Jess Ramsey  1950       Reason for Follow Up:    1.  Upper GI bleed  2.  Gastric ulcer    Subjective:   Patient much more docile today.  Minimal dark stool overnight.    No Known Allergies    Current  Facility-Administered Medications:   •  acetaminophen (TYLENOL) tablet 650 mg, 650 mg, Oral, Q4H PRN **OR** acetaminophen (TYLENOL) 160 MG/5ML solution 650 mg, 650 mg, Oral, Q4H PRN **OR** acetaminophen (TYLENOL) suppository 650 mg, 650 mg, Rectal, Q4H PRN, Gallo Hazel MD  •  Chlorhexidine Gluconate Cloth 2 % pads 1 application, 1 application, Topical, Q24H, Gallo Hazel MD, 1 application at 05/08/23 0349  •  collagenase ointment 1 application, 1 application, Topical, Q12H, Estrella Valdez APRN  •  dextrose 5 % with KCl 20 mEq/L infusion, 75 mL/hr, Intravenous, Continuous, Gallo Hazel MD, Last Rate: 75 mL/hr at 05/08/23 1418, 75 mL/hr at 05/08/23 1418  •  ipratropium-albuterol (DUO-NEB) nebulizer solution 3 mL, 3 mL, Nebulization, Q6H PRN, Gallo Hazel MD  •  Morphine sulfate (PF) injection 1 mg, 1 mg, Intravenous, Q4H PRN, Gallo Hazel MD, 1 mg at 05/07/23 0016  •  mupirocin (BACTROBAN) 2 % nasal ointment 1 application, 1 application, Each Nare, BID, Gallo Hazel MD, 1 application at 05/08/23 0916  •  ondansetron (ZOFRAN) injection 4 mg, 4 mg, Intravenous, Q6H PRN, Gallo Hazel MD  •  pantoprazole (PROTONIX) 40 mg in 100mL NS IVPB, 8 mg/hr, Intravenous, Continuous, Gallo Hazel MD, Last Rate: 20 mL/hr at 05/08/23 1227, 8 mg/hr at 05/08/23 1227  •  Pharmacy to Dose Zosyn, , Does not apply, Continuous PRN, Gallo Hazel MD  •  piperacillin-tazobactam (ZOSYN) 4.5 g in iso-osmotic dextrose 100 mL IVPB (premix), 4.5 g, Intravenous, Q8H, Gallo Hazel MD, 4.5 g at 05/08/23 1230  •  potassium chloride (MICRO-K) CR capsule 40 mEq, 40 mEq, Oral, Once, Gallo Hazel MD  •  sodium chloride 0.9 % flush 10 mL, 10 mL, Intravenous, Q12H, Gallo Hazel MD, 10 mL at 05/08/23 0916  •  sodium chloride 0.9 % flush 10 mL, 10 mL, Intravenous, PRN, Gallo Hazel MD  •  sodium chloride 0.9 % infusion 40 mL, 40 mL, Intravenous, PRN, Gallo Hazel MD  •  ziprasidone (GEODON) injection 10 mg, 10 mg,  Intramuscular, Q12H PRN, Gallo Hazel MD, 10 mg at 05/07/23 0258    Review of Systems:   Review of Systems   All other systems reviewed and are negative.  Unable to obtain due to mental status; patient minimally verbal; difficult to communicate  Vital Signs:  Temp:  [96.9 °F (36.1 °C)-97.7 °F (36.5 °C)] 97.6 °F (36.4 °C)  Heart Rate:  [75-84] 79  Resp:  [17-22] 18  BP: ()/() 135/72  Body mass index is 24.83 kg/m².     Intake/Output Summary (Last 24 hours) at 5/8/2023 1830  Last data filed at 5/8/2023 1200  Gross per 24 hour   Intake 1531.6 ml   Output 655 ml   Net 876.6 ml     I/O this shift:  In: 600.6 [I.V.:485.6; IV Piggyback:115]  Out: 300 [Urine:300]  Physical Exam:  Physical Exam  CV-RRR  Lung-good expansion bilaterally  ABD-benign      Results Review:   I have reviewed all of the patient's current test results    Results from last 7 days   Lab Units 05/08/23  0401 05/07/23  1547 05/07/23  1156 05/07/23  0537 05/06/23  0834 05/06/23  0400   WBC 10*3/mm3 7.40  --   --  8.56  --  8.66   HEMOGLOBIN g/dL 9.5* 9.7* 9.7* 8.2*   < > 10.0*   HEMATOCRIT % 28.4* 28.7* 28.0* 23.4*   < > 30.4*   PLATELETS 10*3/mm3 77*  --   --  84*  --  59*    < > = values in this interval not displayed.       Results from last 7 days   Lab Units 05/08/23  0455 05/07/23  0537 05/06/23  0400 05/05/23  2000   SODIUM mmol/L 150* 153* 147* 144   POTASSIUM mmol/L 3.3* 3.1* 3.6 3.9   CHLORIDE mmol/L 117* 123* 120* 114*   CO2 mmol/L 26.0 26.0 19.0* 25.0   BUN mg/dL 18 31* 52* 56*   CREATININE mg/dL 0.45* 0.51* 0.62 0.83   CALCIUM mg/dL 8.5* 8.3* 7.5* 7.8*   BILIRUBIN mg/dL 0.6 0.7  --  0.3   ALK PHOS U/L 60 51  --  53   ALT (SGPT) U/L 48* 42*  --  54*   AST (SGOT) U/L 40* 39*  --  39*   GLUCOSE mg/dL 87 78 156* 145*       Results from last 7 days   Lab Units 05/07/23  1156 05/06/23  0839   INR  1.09 1.38*       Lab Results   Lab Value Date/Time    LIPASE 25 08/14/2020 0712    LIPASE 16 07/21/2019 2134       Radiology  Review:  Imaging Results (Last 24 Hours)     ** No results found for the last 24 hours. **          Impression/Plan:  Patient Active Problem List   Diagnosis Code   • Septic shock A41.9, R65.21   • Right lower lobe pneumonia J18.9   • Intellectual disability F79   • Anemia D64.9   • Thrombocytopenia D69.6   • Transaminitis R74.01   • Hypoxia R09.02   • Metabolic encephalopathy G93.41   • Acute blood loss anemia D62   • Hypovolemic shock R57.1     UGI bleed/secondary to gastric ulcer.  Status post epinephrine/Hemoclip with moderate success.  No further bleeding with stable Hgb.       Vascular surgery evaluation appreciated     -Continue aggressive supportive care  -PPI  -Advance to full liquids  -Relook EGD tomorrow  -Follow-up H. pylori antibody    Sherif Villegas MD  05/08/23  18:30 CDT    Electronically signed by Sherif Villegas MD at 05/08/23 1834     David Laird DO at 05/05/23 1806              [facility] Medicine Services  HISTORY AND PHYSICAL    Date of Admission: 5/5/2023  Primary Care Physician: David Frazier MD    Subjective   Primary Historian: Chart.    Chief Complaint: Transferred for hypotension.    History of Present Illness  This is a 72-year-old -American female patient who lives at a group home in [place], [place].  She is a snow of the [facility] in [place].  She has had a prior stroke and has a cognitive deficit according to the transferring physician.  Her baseline is unclear to me.  She arrives unable to provide any history.  She is fairly somnolent and does not speak.    She was admitted at the transferring facility on 5/3.  She comes from [facility] in [facility].  Her primary care doctor, Dr. Frazier, had been caring for her.  He states that when he admitted her she was hypothermic and had altered mental status.  He searched for signs of infection and found none.  He felt that she was dehydrated.  She was warmed and placed on  fluids.  She has not improved very much.  She started requiring some oxygen earlier today after she had not been.  He repeated a chest x-ray and saw a right lower lobe pneumonia.  She had also been having blood pressures in the 80s so he placed a central line and started her on norepinephrine.  They do not have an intensive care unit so he requested that she come here.    Review of Systems   Unable to perform ROS: Mental status change      Past Medical History:   Past Medical History:   Diagnosis Date   • Hypertension    • Intellectual disability    • Psychosis    • Right hemiparesis    • Stroke       Past Surgical History:  Past Surgical History:   Procedure Laterality Date   • HIP SURGERY Right 2019     Social History: Alcohol use questions deferred to the physician. Drug use questions deferred to the physician.    Family History: Family history is unknown by patient.       Allergies:  No Known Allergies    Medications:  It appears that her outpatient medications were acetaminophen, amlodipine, aspirin, atorvastatin, carvedilol, loratadine, Risperdal, Dyazide, calcium and vitamin D, PreserVision, and Senokot.    In the hospital she had been receiving albuterol, pantoprazole, Lovenox, acetaminophen, and vancomycin/Zosyn.    I have utilized all available immediate resources to obtain, update, or review the patient's current medications (including all prescriptions, over-the-counter products, herbals, cannabis/cannabidiol products, and vitamin/mineral/dietary (nutritional) supplements).    Objective     Vital Signs: BP 99/68   Pulse 88   Temp 98.5 °F (36.9 °C) (Axillary)   Resp 12   Wt 57 kg (125 lb 10.6 oz)   SpO2 93%   Physical Exam  Constitutional:       Comments: In bed.   HENT:      Head: Normocephalic and atraumatic.      Mouth/Throat:      Mouth: Mucous membranes are dry.   Eyes:      Conjunctiva/sclera: Conjunctivae normal.      Pupils: Pupils are equal, round, and reactive to light.   Neck:       Vascular: No JVD.      Comments: Right IJ CVC placed at the transferring facility.  We will get a chest x-ray to confirm placement.  Cardiovascular:      Rate and Rhythm: Normal rate and regular rhythm.      Heart sounds: Normal heart sounds.   Pulmonary:      Breath sounds: Rhonchi and rales present. No wheezing.      Comments: On 3 L of oxygen.  Slightly tachypneic at times.  Chest:      Chest wall: No tenderness.   Abdominal:      General: Bowel sounds are normal. There is no distension.      Palpations: Abdomen is soft.      Tenderness: There is no abdominal tenderness.   Musculoskeletal:         General: No tenderness or deformity. Normal range of motion.      Cervical back: Neck supple.   Skin:     General: Skin is warm and dry.      Findings: No rash.   Neurological:      Mental Status: She is disoriented.      Motor: Weakness present. No abnormal muscle tone.      Comments: Seems to be globally weak at present, but also record stated she has more chronic right hemiparesis after her stroke.  She responded to painful stimuli in the lower extremities, but not well in the upper extremities.   Psychiatric:      Comments: Fairly somnolent.        Results Reviewed from [facility]:  1.  ABG on 5/3 showed a pH of 7.316, PCO2 65, PaO2 555.  Sat of 100% on room air.  2.  Chemistries from 5/5 show a sodium of 147, potassium 4.1.  Chloride 115 and bicarb 29.  BUN and creatinine 51 and 0.96 respectively.  Calcium 7.9 and glucose 126.  Hepatic function panel shows an AST of 54, ALT 85.  Alkaline phosphatase 62 with a total bilirubin of 0.27.  Total protein 4.0 and albumin 1.4.  3.  Magnesium on 5/3 was 2.1.  4.  BNP slightly elevated today at 322.  5.  High-sensitivity troponin on 5/3 was normal at 15 and was repeated today at 52.  Upper limit of normal is 51.  6.  CBC from today shows white blood cell count of 4.8.  H&H 8.3 and 23.9 respectively.  Platelets 86,000.  7.  COVID-19 testing negative.  8.   Urinalysis showed 2+ bacteria with no pyuria and negative nitrites with trace leukocyte esterase.  9.  EKG from today at 1117 showed sinus rhythm without ST or T wave changes suggestive of ischemia.  10.  She had a recent CT of the head without contrast and a chest x-ray.  The transferring physician stated that her initial chest x-ray failed to show any problems.  He told me on the transfer line that the chest x-ray from today showed a developing right lower lobe pneumonia.  Recent CT of the head was apparently unremarkable.  No reads were sent.      I have personally reviewed and interpreted the radiology studies and ECG obtained at time of admission.     Assessment / Plan   Assessment:   Active Hospital Problems    Diagnosis    • **Septic shock    • Right lower lobe pneumonia    • Hypoxia    • Metabolic encephalopathy    • Anemia    • Thrombocytopenia    • Intellectual disability    • Transaminitis      Treatment Plan  The patient will be admitted to my service here at [facility].  She is transferred here from [facility] in [facility].  Her primary care provider had her in the hospital there for the last 2 days.  She presented with hypothermia and altered mental status.  He thought at first she was just dehydrated and then she ultimately developed a right lower lobe pneumonia and some hypoxemia after initially having clear chest imaging and a normal urinalysis.  She became hypotensive.  A central line was placed and she was started on norepinephrine and sent here.    Check lactate and procalcitonin.  MRSA nasal screen.  Streptococcal/Legionella urinary antigens. Highly unlikely to produce sputum.    Dose vancomycin and Zosyn.    Make efforts at pulmonary toilet.  DuoNeb.    Bolus fluids and check CVP.  Try to wean off norepinephrine.    Speech therapy to evaluate her if she becomes more awake.    She may be chronically thrombocytopenic and anemic.  Unclear if she is.  Also has a  transaminitis.  These issues may need to be looked into further soon.    SCDs for DVT prophylaxis.    Medical Decision Making  Number and Complexity of problems: 1 acute, highly complex problem in the form of her septic shock caused by a presumed right lower lobe pneumonia that was checked out by the transferring provider.  Differential Diagnosis: CHF.    Conditions and Status        Condition is worsening.     [facility] Data  External documents reviewed: Reviewed records from [facility].   Cardiac tracing (EKG, telemetry) interpretation: NSR.   Radiology interpretation: Was told by the transferring facility that she had a right lower lobe pneumonia.  I was told the CT of the head was unremarkable.  Labs reviewed: As above.  Any tests that were considered but not ordered: CT of the chest.     Decision rules/scores evaluated (example [id number], Wells, etc): None considered at present.     Discussed with: The transferring physician as above.  Discussed with her nurse (Chantel) here.     Care Planning  Shared decision making: Unable to consent to anything.  Her transfer was approved by her state guardian.  Code status and discussions: Full with full interventions according to her state guardian.  Her primary care provider discussed her case with them prior to transfer.    Disposition  Social Determinants of Health that impact treatment or disposition: Lives in a group home with an intellectual disability.  [place] of the [facility].  Estimated length of stay is 3-5 days.     I confirmed that the patient's advanced care plan is present, code status is documented, and a surrogate decision maker is listed in the patient's medical record.     The patient's surrogate decision maker is her state guardian in [place].     The patient was seen and examined by me on 05/05/23 at 1745.    Electronically signed by David Laird DO, 05/05/23, 19:13 CDT.    I spent 35 minutes providing critical care management to this patient. This  excludes time spent in performing separately billed procedures.                   Electronically signed by David Laird DO at 05/05/23 1924          Physician Progress Notes (most recent note)      David Laird DO at 05/15/23 0943              [facility] Medicine Services  INPATIENT PROGRESS NOTE    Patient Name: Jess Ramsey  Date of Admission: 5/5/2023  Today's Date: 05/15/23  Length of Stay: 10  Primary Care Physician: David Frazier MD    Subjective   Chief Complaint: Recent GI bleeding.  HPI   I last saw the patient on 5/5 when she was admitted in transfer from [facility].  At presentation, the main thought was that she was experiencing septic shock related to a right lower lobe pneumonia.  However, shortly after presenting her, she had a large melanotic stool and had a hemoglobin of less than 7.  Her BUN was also elevated.  She was felt to have hemorrhagic shock.  She was transfused blood and has undergone endoscopy twice with Dr. Villegas.    She has since stabilized and has been on the floor recently.  She is actually waiting for precertification to go to skilled nursing in [place].    She remains poorly communicative, which is her baseline.  She has a state guardian.    She needs her right IJ CVC removed today.  It has been and since she was transferred here.    Review of Systems   All pertinent negatives and positives are as above. All other systems have been reviewed and are negative unless otherwise stated.     Objective    Temp:  [97.8 °F (36.6 °C)-98.9 °F (37.2 °C)] 97.8 °F (36.6 °C)  Heart Rate:  [72-80] 80  Resp:  [16-17] 17  BP: (118-152)/(59-92) 118/90  Physical Exam  Constitutional:       Comments: Up in the chair.    HENT:      Head: Normocephalic and atraumatic.      Mouth/Throat:      Mouth: Mucous membranes are dry.   Eyes:      Conjunctiva/sclera: Conjunctivae normal.      Pupils: Pupils are equal, round, and reactive to light.   Neck:       Vascular: No JVD.   Cardiovascular: NSR.      Rate and Rhythm: Normal rate and regular rhythm.      Heart sounds: Normal heart sounds.   Pulmonary:      Breath sounds: No rales or rhonchi present. No wheezing.      Comments: On room air.   Chest:      Chest wall: No tenderness.   Abdominal:      General: Bowel sounds are normal. There is no distension.      Palpations: Abdomen is soft.      Tenderness: There is no abdominal tenderness.   Musculoskeletal: Right IJ CVC placed prior to transfer.        General: No tenderness or deformity.      Cervical back: Neck supple.   Skin:     General: Skin is warm and dry.      Findings: No rash.   Neurological:      Mental Status: She is disoriented.      Motor: Weakness present. No abnormal muscle tone.      Comments: Drowsy and weak, which is her baseline.     Intake/Output Summary (Last 24 hours) at 5/15/2023 0957  Last data filed at 5/15/2023 0600  Gross per 24 hour   Intake 240 ml   Output 1925 ml   Net -1685 ml     Results Review:  I have reviewed the labs, radiology results, and diagnostic studies.    Laboratory Data:   Results from last 7 days   Lab Units 05/15/23  0420 05/14/23  0348 05/13/23  0618   WBC 10*3/mm3 10.01 9.15 8.84   HEMOGLOBIN g/dL 8.0* 7.5* 7.9*   HEMATOCRIT % 24.9* 23.4* 26.3*   PLATELETS 10*3/mm3 196 160 130*     Results from last 7 days   Lab Units 05/15/23  0420 05/14/23  0348 05/13/23  0618 05/12/23  1225   SODIUM mmol/L 139 140 141 144   POTASSIUM mmol/L 3.5 3.7 3.8 3.7   CHLORIDE mmol/L 102 104 107 109*   CO2 mmol/L 29.0 29.0 27.0 30.0*   BUN mg/dL 9 8 9 8   CREATININE mg/dL 0.41* 0.36* 0.45* 0.49*   CALCIUM mg/dL 8.7 8.5* 8.5* 8.8   BILIRUBIN mg/dL  --   --   --  0.3   ALK PHOS U/L  --   --   --  71   ALT (SGPT) U/L  --   --   --  23   AST (SGOT) U/L  --   --   --  16   GLUCOSE mg/dL 106* 88 99 90     I have reviewed the patient's current medications.     Assessment/Plan   Assessment  Active Hospital Problems    Diagnosis    • **Hemorrhagic  shock    • Acute gastric ulcer with hemorrhage    • Gastrointestinal hemorrhage with melena    • Acute blood loss anemia    • Right lower lobe pneumonia    • Hypoxia    • Thrombocytopenia    • Metabolic encephalopathy    • Intellectual disability    • Transaminitis      Treatment Plan  The patient was admitted to my service here at [facility] on 5/5.   She has subsequently been cared for by Dr. Hazel since that point in time. She was transferred here from [facility] in [place]. Her primary care provider had her in the hospital there for 2 days prior to transfer.  She had initially presented with hypothermia and altered mental status.  He thought at first she was just dehydrated and then she ultimately developed a right lower lobe pneumonia and some hypoxemia after initially having clear chest imaging and a normal urinalysis.  She became hypotensive.  A central line was placed and she was started on norepinephrine and sent here.     Lactate and procalcitonin were normal.  MRSA nasal screen was negative.  Streptococcal/Legionella urinary antigens were negative.  Unable to produce sputum.  She was treated initially with vancomycin and Zosyn.  Efforts were made at pulmonary toilet.  She has received inhaled bronchodilators.  She has been weaned from oxygen to room air at this point.     She ended up having melena and her hemoglobin came back at 6.7 when first checked here.  She also had a BUN of 56.  Concerns were for an upper GI bleed.  She was started on pantoprazole.  GI was consulted.  She was taken on 5/6 for EGD by Dr. Villegas.  She had gastric ulcers with adherent clot.  These were clipped and injected with epinephrine.  She was taken back on 5/9 and had more clips placed as one of the ulcers had ultimately revealed an underlying visible vessel.  She received a total of 3 units of packed red blood cells.  She has not required blood since 5/6.  Hemoglobin 8.0 this morning after  being 7.5 yesterday.     Speech therapy cleared for puréed foods with thin liquids.       She had a mild transaminitis at transfer, which resolved.      She was mildly thrombocytopenic at transfer and this was likely secondary to consumption from her bleeding.  This has resolved.       SCDs for DVT prophylaxis.     Medical Decision Making  Number and Complexity of problems: 1 acute, highly complex problem in the form of her presumed septic shock caused by a presumed right lower lobe pneumonia at transfer.  However, her condition turned out to be an upper gastrointestinal bleed that was causing hemorrhagic shock.  Differential Diagnosis:  None considered at present.     Conditions and Status        Condition has improved.     [facility] Data  External documents reviewed: Reviewed records from [facility] at presentation.   Cardiac tracing (EKG, telemetry) interpretation: NSR.  Cardiology recently saw for pauses.  They diagnosed her with sinus arrhythmia and recommended she not be placed back on carvedilol.   Radiology interpretation:  Previously interpreted by radiology.  Labs reviewed: As above.  Any tests that were considered but not ordered:  None considered at present.     Decision rules/scores evaluated (example [id number], Wells, etc): None considered at present.     Discussed with: Discussed with her nurse (Ayde).     Care Planning  Shared decision making: Unable to consent to anything.  Her transfer and admission here was approved by her state guardian.  Code status and discussions: Full with full interventions according to her state guardian.  Her primary care provider discussed her case with them prior to transfer.     Disposition  Social Determinants of Health that impact treatment or disposition: Lives in a group home with an intellectual disability.  [place] [facility].  I expect the patient to be discharged to [facility] when precertification goes through.      Electronically  signed by David Laird DO, 05/15/23, 09:43 CDT.      Electronically signed by David Laird DO at 05/15/23 1008          Physical Therapy Notes (most recent note)      Olivia Villanueva, PTA at 23 1114  Version 1 of 1         Acute Care - Physical Therapy Treatment Note  [facility]     Patient Name: Jess Ramsey  : 1950  MRN: [id number]  Today's Date: 2023      Visit Dx:     ICD-10-CM ICD-9-CM   1. Acute blood loss anemia  D62 285.1   2. Hypovolemic shock  R57.1 785.59   3. Oral phase dysphagia  R13.11 787.21   4. Acute gastric ulcer with hemorrhage  K25.0 531.00   5. Impaired mobility and ADLs  Z74.09 V49.89    Z78.9    6. Impaired mobility  Z74.09 799.89     Patient Active Problem List   Diagnosis   • Septic shock   • Right lower lobe pneumonia   • Intellectual disability   • Anemia   • Thrombocytopenia   • Transaminitis   • Hypoxia   • Metabolic encephalopathy   • Acute blood loss anemia   • Hypovolemic shock     Past Medical History:   Diagnosis Date   • Gastritis    • Hypertension    • Intellectual disability    • Psychosis    • Right hemiparesis    • Stroke      Past Surgical History:   Procedure Laterality Date   • ENDOSCOPY N/A 2023    Procedure: ESOPHAGOGASTRODUODENOSCOPY WITH ANESTHESIA;  Surgeon: Sherif Villegas MD;  Location: [facility] OR;  Service: Gastroenterology;  Laterality: N/A;  PRE GI BLEED  POST gastric ulcers,ink and clip   • ENDOSCOPY N/A 2023    Procedure: ESOPHAGOGASTRODUODENOSCOPY WITH ANESTHESIA;  Surgeon: Sherif Villegas MD;  Location: [facility] ENDOSCOPY;  Service: Gastroenterology;  Laterality: N/A;  Pre: Anemia  Post: Gastric ulcers  David Frazier MD   • HIP SURGERY Right 2019     PT Assessment (last 12 hours)     PT Evaluation and Treatment     Row Name 23 1057          Physical Therapy Time and Intention    Document Type therapy note (daily note)  -AB     Mode of Treatment physical therapy  -AB     Row Name 23 1057          General  Information    Existing Precautions/Restrictions fall  -AB     Row Name 05/13/23 1057          Bed Mobility    Supine-Sit Robeson (Bed Mobility) maximum assist (25% patient effort)  -AB     Sit-Supine Robeson (Bed Mobility) maximum assist (25% patient effort)  -AB     Assistive Device (Bed Mobility) bed rails;draw sheet;head of bed elevated  -AB     Row Name 05/13/23 1057          Motor Skills    Comments, Therapeutic Exercise AA-PROM Ilan LE 15 reps  -AB     Additional Documentation Comments, Therapeutic Exercise (Row)  -AB     Row Name             Wound 05/05/23 2107 gluteal    Wound - Properties Group Placement Date: 05/05/23 -SY Placement Time: 2107 -SY Location: gluteal  -SY    Retired Wound - Properties Group Placement Date: 05/05/23 -SY Placement Time: 2107 -SY Location: gluteal  -SY    Retired Wound - Properties Group Date first assessed: 05/05/23 -SY Time first assessed: 2107 -SY Location: gluteal  -SY          User Key  (r) = Recorded By, (t) = Taken By, (c) = Cosigned By    Initials Name Provider Type    Olivia Priest PTA Physical Therapist Assistant    Clemente Caldera, RN Registered Nurse                Physical Therapy Education     Title: PT OT SLP Therapies (In Progress)     Topic: Physical Therapy (In Progress)     Point: Mobility training (In Progress)     Learning Progress Summary           Patient Nonacceptance, E, NL by SB at 5/10/2023 1610    Comment: pt edu on POC, benefits of act and d/c plans                   Point: Home exercise program (Not Started)     Learner Progress:  Not documented in this visit.          Point: Body mechanics (Not Started)     Learner Progress:  Not documented in this visit.          Point: Precautions (In Progress)     Learning Progress Summary           Patient Nonacceptance, E, NL by SB at 5/10/2023 1610    Comment: pt edu on POC, benefits of act and d/c plans                               User Key     Initials Effective Dates Name Provider  Type Discipline    SB 06/16/21 -  Kala Rodriguez, PT DPT Physical Therapist PT              PT Recommendation and Plan         Outcome Measures     Row Name 05/12/23 0844 05/11/23 1423 05/11/23 1000       How much help from another person do you currently need...    Turning from your back to your side while in flat bed without using bedrails? 1  -TB 1  -TB --    Moving from lying on back to sitting on the side of a flat bed without bedrails? 1  -TB 1  -TB --    Moving to and from a bed to a chair (including a wheelchair)? 1  -TB 1  -TB --    Standing up from a chair using your arms (e.g., wheelchair, bedside chair)? 1  -TB 1  -TB --    Climbing 3-5 steps with a railing? 1  -TB 1  -TB --    To walk in hospital room? 1  -TB 1  -TB --    AM-PAC 6 Clicks Score (PT) 6  -TB 6  -TB --       How much help from another is currently needed...    Putting on and taking off regular lower body clothing? -- -- 1  -LS (r) GK (t) LS (c)    Bathing (including washing, rinsing, and drying) -- -- 1  -LS (r) GK (t) LS (c)    Toileting (which includes using toilet bed pan or urinal) -- -- 1  -LS (r) GK (t) LS (c)    Putting on and taking off regular upper body clothing -- -- 1  -LS (r) GK (t) LS (c)    Taking care of personal grooming (such as brushing teeth) -- -- 1  -LS (r) GK (t) LS (c)    Eating meals -- -- 1  -LS (r) GK (t) LS (c)    AM-PAC 6 Clicks Score (OT) -- -- 6  -LS (r) GK (t)       Functional Assessment    Outcome Measure Options AM-PAC 6 Clicks Basic Mobility (PT)  -TB AM-PAC 6 Clicks Basic Mobility (PT)  -TB AM-PAC 6 Clicks Daily Activity (OT)  -LS (r) GK (t) LS (c)          User Key  (r) = Recorded By, (t) = Taken By, (c) = Cosigned By    Initials Name Provider Type    TB Toni Hoffman, PTA Physical Therapist Assistant    Ana M Burton COTA Occupational Therapist Assistant    Juan Pablo Ni OTA Student OT Student                 Time Calculation:    PT Charges     Row Name 05/13/23 1052             Time  Calculation    Start Time 1057  -AB      Stop Time 1121  -AB      Time Calculation (min) 24 min  -AB      PT Received On 23  -AB         Time Calculation- PT    Total Timed Code Minutes- PT 24 minute(s)  -AB            User Key  (r) = Recorded By, (t) = Taken By, (c) = Cosigned By    Initials Name Provider Type    AB Olivia Villanueva PTA Physical Therapist Assistant              Therapy Charges for Today     Code Description Service Date Service Provider Modifiers Qty    91443130005 HC PT THER PROC EA 15 MIN 2023 Oilvia Villanueva PTA GP 2          PT G-Codes  Outcome Measure Options: AM-PAC 6 Clicks Basic Mobility (PT)  AM-PAC 6 Clicks Score (PT): 6  AM-PAC 6 Clicks Score (OT): 6    Olivia Villanueva PTA  2023      Electronically signed by Olivia Villanueva PTA at 23 1115          Occupational Therapy Notes (most recent note)      Breanne Petty COTA at 05/15/23 1007          Patient Name: Jess Ramsey  : 1950    MRN: [id number]                              Today's Date: 5/15/2023       Admit Date: 2023    Visit Dx: Therapist utilized gait belt, applied non-slipped socks, provided fall risk education/prevention, & facilitated muscle strengthening PRN to reduce patient falls risk during this session.      ICD-10-CM ICD-9-CM   1. Acute blood loss anemia  D62 285.1   2. Hypovolemic shock  R57.1 785.59   3. Oral phase dysphagia  R13.11 787.21   4. Acute gastric ulcer with hemorrhage  K25.0 531.00   5. Impaired mobility and ADLs  Z74.09 V49.89    Z78.9    6. Impaired mobility  Z74.09 799.89     Patient Active Problem List   Diagnosis   • Right lower lobe pneumonia   • Intellectual disability   • Thrombocytopenia   • Transaminitis   • Hypoxia   • Metabolic encephalopathy   • Acute blood loss anemia   • Hemorrhagic shock   • Acute gastric ulcer with hemorrhage   • Gastrointestinal hemorrhage with melena     Past Medical History:   Diagnosis Date   • Gastritis    • Hypertension    •  Intellectual disability    • Psychosis    • Right hemiparesis    • Stroke      Past Surgical History:   Procedure Laterality Date   • ENDOSCOPY N/A 5/6/2023    Procedure: ESOPHAGOGASTRODUODENOSCOPY WITH ANESTHESIA;  Surgeon: Sherif Villegas MD;  Location: [facility] OR;  Service: Gastroenterology;  Laterality: N/A;  PRE GI BLEED  POST gastric ulcers,ink and clip   • ENDOSCOPY N/A 5/9/2023    Procedure: ESOPHAGOGASTRODUODENOSCOPY WITH ANESTHESIA;  Surgeon: Sherif Villegas MD;  Location: [facility] ENDOSCOPY;  Service: Gastroenterology;  Laterality: N/A;  Pre: Anemia  Post: Gastric ulcers  David Frazier MD   • HIP SURGERY Right 2019      General Information     Row Name 05/15/23 1007          OT Time and Intention    Document Type therapy note (daily note)  -MT     Row Name 05/15/23 1007          General Information    Patient Profile Reviewed yes  -MT     Existing Precautions/Restrictions fall  -MT     Row Name 05/15/23 1007          Cognition    Orientation Status (Cognition) unable/difficult to assess  -MT     Row Name 05/15/23 1007          Safety Issues, Functional Mobility    Impairments Affecting Function (Mobility) balance;cognition;strength;endurance/activity tolerance;grasp;pain;muscle tone abnormal;postural/trunk control;range of motion (ROM)  -MT     Cognitive Impairments, Mobility Safety/Performance attention;awareness, need for assistance;impulsivity;insight into deficits/self-awareness;judgment;problem-solving/reasoning;safety precaution awareness;safety precaution follow-through  -MT     Comment, Safety Issues/Impairments (Mobility) pt with approx 25% command following with repeated cues and demo at times. Pt demo'd difficulty with coordnitation in BUEs  -MT           User Key  (r) = Recorded By, (t) = Taken By, (c) = Cosigned By    Initials Name Provider Type    MT Breanne Petty COTA Occupational Therapist Assistant                 Mobility/ADL's     Row Name 05/15/23 1007          Bed Mobility     "Comment, (Bed Mobility) pt up in chair, declined \"no\" and head shake to getting BTB. When asked if she wanted to stay up in chair longer head shake of yes.  -MT           User Key  (r) = Recorded By, (t) = Taken By, (c) = Cosigned By    Initials Name Provider Type    Breanne Boggs COTA Occupational Therapist Assistant               Obj/Interventions     Row Name 05/15/23 1007          Balance    Comment, Balance Performed balance activities of reaching outside MARLON and maintaining midline after balance challanges. Pt required initiation of movements by VAIL/L of reaching with BUEs unilaterally  -MT           User Key  (r) = Recorded By, (t) = Taken By, (c) = Cosigned By    Initials Name Provider Type    Breanne Boggs COTA Occupational Therapist Assistant               Goals/Plan    No documentation.                Clinical Impression     [facility] Name 05/15/23 1007          Pain Scale: FACES Pre/Post-Treatment    Pain: FACES Scale, Pretreatment 0-->no hurt  -MT     Posttreatment Pain Rating 4-->hurts little more  stretching RUE at shoulder to acheive increased available range, pain at approx 70 degrees shoulder flex  -[facility] Name 05/15/23 1007          Therapy Plan Review/Discharge Plan (OT)    Anticipated Discharge Disposition (OT) skilled nursing facility  -MT     Row Name 05/15/23 1007          Vital Signs    O2 Delivery Pre Treatment room air  -MT     Row Name 05/15/23 1007          Positioning and Restraints    Pre-Treatment Position sitting in chair/recliner  -MT     Post Treatment Position chair  -MT     In Chair call light within reach;encouraged to call for assist;notified nsg;RUE elevated;LUE elevated;legs elevated  -MT           User Key  (r) = Recorded By, (t) = Taken By, (c) = Cosigned By    Initials Name Provider Type    Breanne Boggs COTA Occupational Therapist Assistant               Outcome Measures     Row Name 05/15/23 1007          How much help from another is currently needed... "    Putting on and taking off regular lower body clothing? 1  -MT     Bathing (including washing, rinsing, and drying) 1  -MT     Toileting (which includes using toilet bed pan or urinal) 1  -MT     Putting on and taking off regular upper body clothing 1  -MT     Taking care of personal grooming (such as brushing teeth) 1  -MT     Eating meals 1  -MT     AM-PAC 6 Clicks Score (OT) 6  -MT           User Key  (r) = Recorded By, (t) = Taken By, (c) = Cosigned By    Initials Name Provider Type    MT Breanne Petty COTA Occupational Therapist Assistant                Occupational Therapy Education     Title: PT OT SLP Therapies (In Progress)     Topic: Occupational Therapy (In Progress)     Point: ADL training (Done)     Description:   Instruct learner(s) on proper safety adaptation and remediation techniques during self care or transfers.   Instruct in proper use of assistive devices.              Learning Progress Summary           Patient Acceptance, E, VU,NR by DIANA at 5/11/2023 1043    Comment: Pt educated on safety during bed mobility    Acceptance, E, NR,NL by ЮЛИЯ at 5/10/2023 1528                   Point: Home exercise program (Not Started)     Description:   Instruct learner(s) on appropriate technique for monitoring, assisting and/or progressing therapeutic exercises/activities.              Learner Progress:  Not documented in this visit.          Point: Precautions (In Progress)     Description:   Instruct learner(s) on prescribed precautions during self-care and functional transfers.              Learning Progress Summary           Patient Acceptance, E, NR,NL by ЮЛИЯ at 5/10/2023 1528                   Point: Body mechanics (Not Started)     Description:   Instruct learner(s) on proper positioning and spine alignment during self-care, functional mobility activities and/or exercises.              Learner Progress:  Not documented in this visit.                      User Key     Initials Effective Dates Name  "Provider Type Discipline     11/10/21 -  Jessenia Alexandre, OTR/L, CSRS Occupational Therapist OT     02/20/23 -  Juan Pablo Romano OTA Student OT Student OT              OT Recommendation and Plan     Plan of Care Review  Plan of Care Reviewed With: patient  Progress: improving  Outcome Evaluation: pt with approx 25% command following with repeated cues and demo at times. Pt demo'd difficulty with coordnitation in BUEs and decreased ROM/strength which is limiting her ADL performance and ability to hold grooming/feeding items. Performed balance activities of reaching outside MARLON and maintaining midline after balance challanges. Pt required initiation of movements by VAIL/L of reaching with BUEs unilaterally. Pt has difficulty following cues and sequencing tasks for ther ex performance. pt up in chair, declined \"no\" and head shake to getting BTB. When asked if she wanted to stay up in chair longer head shake of yes. Provided support to L side d/t lateral lean and elevated BUEs/LEs for skin protection. Recommend SNF at d/c.     Time Calculation:    Time Calculation- OT     Row Name 05/15/23 1007             Time Calculation- OT    OT Start Time 1007  -MT      OT Stop Time 1030  -MT      OT Time Calculation (min) 23 min  -MT      Total Timed Code Minutes- OT 23 minute(s)  -MT      OT Received On 05/15/23  -MT         Timed Charges    00709 - OT Therapeutic Activity Minutes 23  -MT         Total Minutes    Timed Charges Total Minutes 23  -MT       Total Minutes 23  -MT            User Key  (r) = Recorded By, (t) = Taken By, (c) = Cosigned By    Initials Name Provider Type    MT Breanne Petty COTA Occupational Therapist Assistant              Therapy Charges for Today     Code Description Service Date Service Provider Modifiers Qty    35778944755  OT THERAPEUTIC ACT EA 15 MIN 5/15/2023 Breanne Petty COTA GO 2               YARED Woods  5/15/2023    Electronically signed by Breanne Petty COTA at " 05/15/23 1034

## 2023-05-15 NOTE — PLAN OF CARE
Goal Outcome Evaluation:  Plan of Care Reviewed With: patient        Progress: improving  Outcome Evaluation: Pt in bed agreeable to tx. Asked pt if she'd like to get to EOB today and she excitedly shook her head. Required MAX to get to EOB. Pt started to move LEs to edge w/ lots of cues. Once EOB pt needed CGA to maintain balance. Performed AROM-AAROM BLE x15 w/ MAX cues to maintain focus. Stood Mod x2 w/ HHA. Stood ~20 secs w/ cues for posture. Seated rest before standing again and taking 2 steps to the chair. Pt started to sit too earlu needing MAX to redirect and get to the chair safely. Max x2 ro scoot back in chair. Pt w/ L lateral lean in the chair so pt was supported upright. Will cont POC.

## 2023-05-15 NOTE — CASE MANAGEMENT/SOCIAL WORK
"Continued Stay Note  [facility]     Patient Name: Jess Ramsey  MRN: [id number]  Today's Date: 5/15/2023    Admit Date: 5/5/2023    Plan: SNF   Discharge Plan     Row Name 05/15/23 1136       Plan    Plan SNF    Plan Comments Aliyah Barger now says [facility] cannot accept because of \"behavior\", SW did ask lAiyah to specify what behaviors are indicated. Aliyah says she is unsure but her nurse \"read it somewhere\". SW has reviewed entire chart and also spoke to nurse that has had patient and there are no reports of any negative or disruptive behaviors. KYLIE contacted Jessica at [facility] to ask about referral. KYLIE called and left a message at [facility] to see if they will consider. KYLIE also faxed new referrals to the following facilities: [facility], [facility], [facility], and [facility].                 EMMANUELLE Molina    "

## 2023-05-15 NOTE — CASE MANAGEMENT/SOCIAL WORK
"Continued Stay Note  [facility]     Patient Name: Jess Ramsey  MRN: [id number]  Today's Date: 5/15/2023    Admit Date: 5/5/2023    Plan: SNF   Discharge Plan     Row Name 05/15/23 1412       Plan    Plan SNF    Plan Comments SW has made additional referrals to the following SNFs: [facility], [facility], [facility], [facility], [facility], [facility], [facility], [facility], [facility], and [facility].    Row Name 05/15/23 1136       Plan    Plan SNF    Plan Comments Aliyah Barger now says [facility] cannot accept because of \"behavior\", SW did ask Aliyah to specify what behaviors are indicated. Aliyah says she is unsure but her nurse \"read it somewhere\". KYLIE has reviewed entire chart and also spoke to nurse that has had patient and there are no reports of any negative or disruptive behaviors. KYLIE contacted Jessica at [facility] to ask about referral. KYLIE called and left a message at [facility] to see if they will consider. KYLIE also faxed new referrals to the following facilities: [facility], [facility], [facility], [facility], and [facility].                EMMANUELLE Molina    "

## 2023-05-15 NOTE — PLAN OF CARE
Goal Outcome Evaluation:              Outcome Evaluation: NTN follow up. Fed by staff. PO % meals, encouraging [employer] all meals. Continues to receive wound care to gluteal. Smiled but did not communicate with this RD. Wt stable at 130.4lb. NTN following per protocol.

## 2023-05-15 NOTE — THERAPY TREATMENT NOTE
Patient Name: Jess Ramsey  : 1950    MRN: [id number]                              Today's Date: 5/15/2023       Admit Date: 2023    Visit Dx: Therapist utilized gait belt, applied non-slipped socks, provided fall risk education/prevention, & facilitated muscle strengthening PRN to reduce patient falls risk during this session.      ICD-10-CM ICD-9-CM   1. Acute blood loss anemia  D62 285.1   2. Hypovolemic shock  R57.1 785.59   3. Oral phase dysphagia  R13.11 787.21   4. Acute gastric ulcer with hemorrhage  K25.0 531.00   5. Impaired mobility and ADLs  Z74.09 V49.89    Z78.9    6. Impaired mobility  Z74.09 799.89     Patient Active Problem List   Diagnosis   • Right lower lobe pneumonia   • Intellectual disability   • Thrombocytopenia   • Transaminitis   • Hypoxia   • Metabolic encephalopathy   • Acute blood loss anemia   • Hemorrhagic shock   • Acute gastric ulcer with hemorrhage   • Gastrointestinal hemorrhage with melena     Past Medical History:   Diagnosis Date   • Gastritis    • Hypertension    • Intellectual disability    • Psychosis    • Right hemiparesis    • Stroke      Past Surgical History:   Procedure Laterality Date   • ENDOSCOPY N/A 2023    Procedure: ESOPHAGOGASTRODUODENOSCOPY WITH ANESTHESIA;  Surgeon: Sherif Villegas MD;  Location: [facility] OR;  Service: Gastroenterology;  Laterality: N/A;  PRE GI BLEED  POST gastric ulcers,ink and clip   • ENDOSCOPY N/A 2023    Procedure: ESOPHAGOGASTRODUODENOSCOPY WITH ANESTHESIA;  Surgeon: Sherif Villegas MD;  Location: [facility] ENDOSCOPY;  Service: Gastroenterology;  Laterality: N/A;  Pre: Anemia  Post: Gastric ulcers  David Frazier MD   • HIP SURGERY Right       General Information     Row Name 05/15/23 1007          OT Time and Intention    Document Type therapy note (daily note)  -MT     Row Name 05/15/23 1007          General Information    Patient Profile Reviewed yes  -MT     Existing Precautions/Restrictions fall  -MT     Row  "Name 05/15/23 1007          Cognition    Orientation Status (Cognition) unable/difficult to assess  -MT     Row Name 05/15/23 1007          Safety Issues, Functional Mobility    Impairments Affecting Function (Mobility) balance;cognition;strength;endurance/activity tolerance;grasp;pain;muscle tone abnormal;postural/trunk control;range of motion (ROM)  -MT     Cognitive Impairments, Mobility Safety/Performance attention;awareness, need for assistance;impulsivity;insight into deficits/self-awareness;judgment;problem-solving/reasoning;safety precaution awareness;safety precaution follow-through  -MT     Comment, Safety Issues/Impairments (Mobility) pt with approx 25% command following with repeated cues and demo at times. Pt demo'd difficulty with coordnitation in BUEs  -MT           User Key  (r) = Recorded By, (t) = Taken By, (c) = Cosigned By    Initials Name Provider Type    Breanne Boggs COTA Occupational Therapist Assistant                 Mobility/ADL's     Row Name 05/15/23 1007          Bed Mobility    Comment, (Bed Mobility) pt up in chair, declined \"no\" and head shake to getting BTB. When asked if she wanted to stay up in chair longer head shake of yes.  -MT           User Key  (r) = Recorded By, (t) = Taken By, (c) = Cosigned By    Initials Name Provider Type    Breanne Boggs COTA Occupational Therapist Assistant               Obj/Interventions     Row Name 05/15/23 1007          Balance    Comment, Balance Performed balance activities of reaching outside MARLON and maintaining midline after balance challanges. Pt required initiation of movements by VAIL/L of reaching with BUEs unilaterally  -MT           User Key  (r) = Recorded By, (t) = Taken By, (c) = Cosigned By    Initials Name Provider Type    Breanne Boggs COTA Occupational Therapist Assistant               Goals/Plan    No documentation.                Clinical Impression     Row Name 05/15/23 1007          Pain Scale: FACES " Pre/Post-Treatment    Pain: FACES Scale, Pretreatment 0-->no hurt  -MT     Posttreatment Pain Rating 4-->hurts little more  stretching RUE at shoulder to acheive increased available range, pain at approx 70 degrees shoulder flex  -MT     Row Name 05/15/23 1007          Therapy Plan Review/Discharge Plan (OT)    Anticipated Discharge Disposition (OT) skilled nursing [facility]  -MT     Row Name 05/15/23 1007          Vital Signs    O2 Delivery Pre Treatment room air  -MT     Row Name 05/15/23 1007          Positioning and Restraints    Pre-Treatment Position sitting in chair/recliner  -MT     Post Treatment Position chair  -MT     In Chair call light within reach;encouraged to call for assist;notified nsg;RUE elevated;LUE elevated;legs elevated  -MT           User Key  (r) = Recorded By, (t) = Taken By, (c) = Cosigned By    Initials Name Provider Type    Breanne Boggs COTA Occupational Therapist Assistant               Outcome Measures     Row Name 05/15/23 1007          How much help from another is currently needed...    Putting on and taking off regular lower body clothing? 1  -MT     Bathing (including washing, rinsing, and drying) 1  -MT     Toileting (which includes using toilet bed pan or urinal) 1  -MT     Putting on and taking off regular upper body clothing 1  -MT     Taking care of personal grooming (such as brushing teeth) 1  -MT     Eating meals 1  -MT     AM-PAC 6 Clicks Score (OT) 6  -MT           User Key  (r) = Recorded By, (t) = Taken By, (c) = Cosigned By    Initials Name Provider Type    Breanne Boggs COTA Occupational Therapist Assistant                Occupational Therapy Education     Title: PT OT SLP Therapies (In Progress)     Topic: Occupational Therapy (In Progress)     Point: ADL training (Done)     Description:   Instruct learner(s) on proper safety adaptation and remediation techniques during self care or transfers.   Instruct in proper use of assistive devices.               "Learning Progress Summary           Patient Acceptance, E, VU,NR by  at 5/11/2023 1043    Comment: Pt educated on safety during bed mobility    Acceptance, E, NR,NL by ЮЛИЯ at 5/10/2023 1528                   Point: Home exercise program (Not Started)     Description:   Instruct learner(s) on appropriate technique for monitoring, assisting and/or progressing therapeutic exercises/activities.              Learner Progress:  Not documented in this visit.          Point: Precautions (In Progress)     Description:   Instruct learner(s) on prescribed precautions during self-care and functional transfers.              Learning Progress Summary           Patient Acceptance, E, NR,NL by ЮЛИЯ at 5/10/2023 1528                   Point: Body mechanics (Not Started)     Description:   Instruct learner(s) on proper positioning and spine alignment during self-care, functional mobility activities and/or exercises.              Learner Progress:  Not documented in this visit.                      User Key     Initials Effective Dates Name Provider Type Discipline     11/10/21 -  Jessenia Alexandre OTR/L, CSRS Occupational Therapist OT     02/20/23 -  Juan Pablo Romano OTA Student OT Student OT              OT Recommendation and Plan     Plan of Care Review  Plan of Care Reviewed With: patient  Progress: improving  Outcome Evaluation: pt with approx 25% command following with repeated cues and demo at times. Pt demo'd difficulty with coordnitation in BUEs and decreased ROM/strength which is limiting her ADL performance and ability to hold grooming/feeding items. Performed balance activities of reaching outside MARLON and maintaining midline after balance challanges. Pt required initiation of movements by VAIL/L of reaching with BUEs unilaterally. Pt has difficulty following cues and sequencing tasks for ther ex performance. pt up in chair, declined \"no\" and head shake to getting BTB. When asked if she wanted to stay up in chair longer " head shake of yes. Provided support to L side d/t lateral lean and elevated BUEs/LEs for skin protection. Recommend SNF at d/c.     Time Calculation:    Time Calculation- OT     Row Name 05/15/23 1007             Time Calculation- OT    OT Start Time 1007  -MT      OT Stop Time 1030  -MT      OT Time Calculation (min) 23 min  -MT      Total Timed Code Minutes- OT 23 minute(s)  -MT      OT Received On 05/15/23  -MT         Timed Charges    28930 - OT Therapeutic Activity Minutes 23  -MT         Total Minutes    Timed Charges Total Minutes 23  -MT       Total Minutes 23  -MT            User Key  (r) = Recorded By, (t) = Taken By, (c) = Cosigned By    Initials Name Provider Type    MT Breanne Petty COTA Occupational Therapist Assistant              Therapy Charges for Today     Code Description Service Date Service Provider Modifiers Qty    87389110524  OT THERAPEUTIC ACT EA 15 MIN 5/15/2023 Breanne Petty COTA GO 2               YARED Woods  5/15/2023

## 2023-05-16 VITALS
OXYGEN SATURATION: 94 % | HEIGHT: 61 IN | RESPIRATION RATE: 18 BRPM | SYSTOLIC BLOOD PRESSURE: 126 MMHG | BODY MASS INDEX: 24.62 KG/M2 | DIASTOLIC BLOOD PRESSURE: 79 MMHG | TEMPERATURE: 98.4 F | WEIGHT: 130.4 LBS | HEART RATE: 77 BPM

## 2023-05-16 LAB
ANION GAP SERPL CALCULATED.3IONS-SCNC: 5 MMOL/L (ref 5–15)
BUN SERPL-MCNC: 9 MG/DL (ref 8–23)
BUN/CREAT SERPL: 22.5 (ref 7–25)
CALCIUM SPEC-SCNC: 8.7 MG/DL (ref 8.6–10.5)
CHLORIDE SERPL-SCNC: 103 MMOL/L (ref 98–107)
CO2 SERPL-SCNC: 30 MMOL/L (ref 22–29)
CREAT SERPL-MCNC: 0.4 MG/DL (ref 0.57–1)
DEPRECATED RDW RBC AUTO: 50.5 FL (ref 37–54)
EGFRCR SERPLBLD CKD-EPI 2021: 104.7 ML/MIN/1.73
ERYTHROCYTE [DISTWIDTH] IN BLOOD BY AUTOMATED COUNT: 15.8 % (ref 12.3–15.4)
GLUCOSE SERPL-MCNC: 99 MG/DL (ref 65–99)
HCT VFR BLD AUTO: 25.5 % (ref 34–46.6)
HGB BLD-MCNC: 8.5 G/DL (ref 12–15.9)
MCH RBC QN AUTO: 29.5 PG (ref 26.6–33)
MCHC RBC AUTO-ENTMCNC: 33.3 G/DL (ref 31.5–35.7)
MCV RBC AUTO: 88.5 FL (ref 79–97)
PLATELET # BLD AUTO: 235 10*3/MM3 (ref 140–450)
PMV BLD AUTO: 11.2 FL (ref 6–12)
POTASSIUM SERPL-SCNC: 3.7 MMOL/L (ref 3.5–5.2)
RBC # BLD AUTO: 2.88 10*6/MM3 (ref 3.77–5.28)
SODIUM SERPL-SCNC: 138 MMOL/L (ref 136–145)
WBC NRBC COR # BLD: 8.9 10*3/MM3 (ref 3.4–10.8)

## 2023-05-16 PROCEDURE — 97530 THERAPEUTIC ACTIVITIES: CPT

## 2023-05-16 PROCEDURE — 80048 BASIC METABOLIC PNL TOTAL CA: CPT | Performed by: FAMILY MEDICINE

## 2023-05-16 PROCEDURE — 97110 THERAPEUTIC EXERCISES: CPT

## 2023-05-16 PROCEDURE — 85027 COMPLETE CBC AUTOMATED: CPT | Performed by: FAMILY MEDICINE

## 2023-05-16 RX ORDER — PANTOPRAZOLE SODIUM 40 MG/1
40 TABLET, DELAYED RELEASE ORAL
Start: 2023-05-16

## 2023-05-16 RX ADMIN — PANTOPRAZOLE SODIUM 40 MG: 40 TABLET, DELAYED RELEASE ORAL at 06:39

## 2023-05-16 RX ADMIN — RISPERIDONE 1 MG: 1 TABLET, FILM COATED ORAL at 09:02

## 2023-05-16 RX ADMIN — ASPIRIN 81 MG: 81 TABLET, CHEWABLE ORAL at 09:02

## 2023-05-16 RX ADMIN — COLLAGENASE SANTYL 1 APPLICATION: 250 OINTMENT TOPICAL at 11:42

## 2023-05-16 NOTE — THERAPY TREATMENT NOTE
Acute Care - Physical Therapy Treatment Note  [facility]     Patient Name: Jess Ramsey  : 1950  MRN: [id number]  Today's Date: 2023      Visit Dx:     ICD-10-CM ICD-9-CM   1. Acute blood loss anemia  D62 285.1   2. Hypovolemic shock  R57.1 785.59   3. Oral phase dysphagia  R13.11 787.21   4. Acute gastric ulcer with hemorrhage  K25.0 531.00   5. Impaired mobility and ADLs  Z74.09 V49.89    Z78.9    6. Impaired mobility  Z74.09 799.89     Patient Active Problem List   Diagnosis    Right lower lobe pneumonia    Intellectual disability    Thrombocytopenia    Transaminitis    Hypoxia    Metabolic encephalopathy    Acute blood loss anemia    Hemorrhagic shock    Acute gastric ulcer with hemorrhage    Gastrointestinal hemorrhage with melena     Past Medical History:   Diagnosis Date    Gastritis     Hypertension     Intellectual disability     Psychosis     Right hemiparesis     Stroke      Past Surgical History:   Procedure Laterality Date    ENDOSCOPY N/A 2023    Procedure: ESOPHAGOGASTRODUODENOSCOPY WITH ANESTHESIA;  Surgeon: Sherif Villegas MD;  Location: [facility] OR;  Service: Gastroenterology;  Laterality: N/A;  PRE GI BLEED  POST gastric ulcers,ink and clip    ENDOSCOPY N/A 2023    Procedure: ESOPHAGOGASTRODUODENOSCOPY WITH ANESTHESIA;  Surgeon: Sherif Villegas MD;  Location: [facility] ENDOSCOPY;  Service: Gastroenterology;  Laterality: N/A;  Pre: Anemia  Post: Gastric ulcers  aDvid Frazier MD    HIP SURGERY Right 2019     PT Assessment (last 12 hours)       PT Evaluation and Treatment       Row Name 23 0810          Physical Therapy Time and Intention    Subjective Information no complaints  -TB     Document Type therapy note (daily note)  -TB     Mode of Treatment physical therapy  -TB       Row Name 23 0810          General Information    Existing Precautions/Restrictions fall  -TB       Row Name 23 0810          Bed Mobility    Bed Mobility scooting/bridging;supine-sit   -TB     Scooting/Bridging Kleberg (Bed Mobility) maximum assist (25% patient effort);verbal cues  -TB     Supine-Sit Kleberg (Bed Mobility) maximum assist (25% patient effort);verbal cues  -TB     Bed Mobility, Safety Issues decreased use of arms for pushing/pulling;decreased use of legs for bridging/pushing;impaired trunk control for bed mobility;cognitive deficits limit understanding  -TB     Assistive Device (Bed Mobility) head of bed elevated  -TB       Row Name 05/16/23 0810          Transfers    Transfers sit-stand transfer;stand-sit transfer;bed-chair transfer  -TB       Row Name 05/16/23 0810          Bed-Chair Transfer    Bed-Chair Kleberg (Transfers) moderate assist (50% patient effort);2 person assist;verbal cues  -TB     Assistive Device (Bed-Chair Transfers) other (see comments)  HHAx2  -TB       Row Name 05/16/23 0810          Sit-Stand Transfer    Sit-Stand Kleberg (Transfers) moderate assist (50% patient effort);2 person assist;verbal cues  -TB     Assistive Device (Sit-Stand Transfers) other (see comments)  HHAx2  -TB       Row Name 05/16/23 0810          Stand-Sit Transfer    Stand-Sit Kleberg (Transfers) moderate assist (50% patient effort);2 person assist;verbal cues  -TB     Assistive Device (Stand-Sit Transfers) other (see comments)  HHAx2  -TB       Row Name 05/16/23 0810          Balance    Balance Assessment sitting static balance;sitting dynamic balance  -TB     Static Sitting Balance contact guard;verbal cues  -TB     Dynamic Sitting Balance contact guard;verbal cues  -TB     Position, Sitting Balance supported;sitting in chair;sitting edge of bed  -TB     Comment, Balance Reaching across midline, Reaching outside MARLON  -TB       Row Name 05/16/23 0810          Motor Skills    Comments, Therapeutic Exercise AROM BLE x15  -TB       Row Name             Wound 05/05/23 2107 gluteal    Wound - Properties Group Placement Date: 05/05/23 -SY Placement Time: 2107 -SY  Location: gluteal  -SY    Retired Wound - Properties Group Placement Date: 05/05/23  -SY Placement Time: 2107 -SY Location: gluteal  -SY    Retired Wound - Properties Group Date first assessed: 05/05/23 -SY Time first assessed: 2107 -SY Location: gluteal  -SY      Row Name 05/16/23 0810          Plan of Care Review    Plan of Care Reviewed With patient  -TB     Progress improving  -TB     Outcome Evaluation Pt in bed agreeable to tx. Bed mob Max A to EOB. Sitting balance CGA for safety. Pt w/ FF posture and L lateral lean. Stood Min x2 w/ cues for posture. Tolerated standing ~30 secs. Practiced standing x2. On 3rd stand pt took 3-4 steps to the chair. Pt tried sitting too soon needing MAX to get her back to the chair. Performed seated AROM BLE x15 w/ increased time and cues for technique. Pt able to sit up in the chair w/ BUE support. Completed BUE reaching across midline and outside MARLON. Will cont POC.  -TB       Row Name 05/16/23 0810          Positioning and Restraints    Pre-Treatment Position in bed  -TB     Post Treatment Position chair  -TB     In Chair notified nsg;reclined;sitting;call light within reach;encouraged to call for assist;legs elevated;heels elevated;RUE elevated;LUE elevated  -TB               User Key  (r) = Recorded By, (t) = Taken By, (c) = Cosigned By      Initials Name Provider Type    TB Toni Hoffman, PTA Physical Therapist Assistant    Clemente Caldera, RN Registered Nurse                    Physical Therapy Education       Title: PT OT SLP Therapies (In Progress)       Topic: Physical Therapy (In Progress)       Point: Mobility training (In Progress)       Learning Progress Summary             Patient Nonacceptance, E, NL by SB at 5/10/2023 1610    Comment: pt edu on POC, benefits of act and d/c plans                         Point: Home exercise program (Not Started)       Learner Progress:  Not documented in this visit.              Point: Body mechanics (Not Started)        Learner Progress:  Not documented in this visit.              Point: Precautions (In Progress)       Learning Progress Summary             Patient Nonacceptance, E, NL by SB at 5/10/2023 1610    Comment: pt edu on POC, benefits of act and d/c plans                                         User Key       Initials Effective Dates Name Provider Type Discipline    JANIS 06/16/21 -  Kala Rodriguez, PT DPT Physical Therapist PT                  PT Recommendation and Plan     Plan of Care Reviewed With: patient  Progress: improving  Outcome Evaluation: Pt in bed agreeable to tx. Bed mob Max A to EOB. Sitting balance CGA for safety. Pt w/ FF posture and L lateral lean. Stood Min x2 w/ cues for posture. Tolerated standing ~30 secs. Practiced standing x2. On 3rd stand pt took 3-4 steps to the chair. Pt tried sitting too soon needing MAX to get her back to the chair. Performed seated AROM BLE x15 w/ increased time and cues for technique. Pt able to sit up in the chair w/ BUE support. Completed BUE reaching across midline and outside MARLON. Will cont POC.   Outcome Measures       Row Name 05/16/23 0810 05/15/23 0835          How much help from another person do you currently need...    Turning from your back to your side while in flat bed without using bedrails? 2  -TB 2  -TB     Moving from lying on back to sitting on the side of a flat bed without bedrails? 2  -TB 2  -TB     Moving to and from a bed to a chair (including a wheelchair)? 2  -TB 2  -TB     Standing up from a chair using your arms (e.g., wheelchair, bedside chair)? 2  -TB 2  -TB     Climbing 3-5 steps with a railing? 1  -TB 1  -TB     To walk in hospital room? 1  -TB 1  -TB     AM-PAC 6 Clicks Score (PT) 10  -TB 10  -TB        Functional Assessment    Outcome Measure Options AM-PAC 6 Clicks Basic Mobility (PT)  -TB AM-PAC 6 Clicks Basic Mobility (PT)  -TB               User Key  (r) = Recorded By, (t) = Taken By, (c) = Cosigned By      Initials Name Provider Type    TB  Toni Hoffman PTA Physical Therapist Assistant                     Time Calculation:    PT Charges       Row Name 05/16/23 1122             Time Calculation    Start Time 0810  -TB      Stop Time 0849  -TB      Time Calculation (min) 39 min  -TB      PT Received On 05/16/23  -TB         Time Calculation- PT    Total Timed Code Minutes- PT 39 minute(s)  -TB                User Key  (r) = Recorded By, (t) = Taken By, (c) = Cosigned By      Initials Name Provider Type    TB Toni Hoffman PTA Physical Therapist Assistant                  Therapy Charges for Today       Code Description Service Date Service Provider Modifiers Qty    98227633194 HC PT THER PROC EA 15 MIN 5/15/2023 Toni Hoffman, PTA GP 1    61277732950 HC PT THERAPEUTIC ACT EA 15 MIN 5/15/2023 Toni Hoffman, RIOS GP 2    93653828055 HC PT THER PROC EA 15 MIN 5/16/2023 Toni Hoffman, RIOS GP 1    59244351715 HC PT THERAPEUTIC ACT EA 15 MIN 5/16/2023 Toni Hoffman, PTA GP 2            PT G-Codes  Outcome Measure Options: AM-PAC 6 Clicks Basic Mobility (PT)  AM-PAC 6 Clicks Score (PT): 10  AM-PAC 6 Clicks Score (OT): 6    Toni Hoffman PTA  5/16/2023

## 2023-05-16 NOTE — PLAN OF CARE
Goal Outcome Evaluation:  Plan of Care Reviewed With: patient        Progress: improving  Outcome Evaluation: Pt in bed agreeable to tx. Bed mob Max A to EOB. Sitting balance CGA for safety. Pt w/ FF posture and L lateral lean. Stood Min x2 w/ cues for posture. Tolerated standing ~30 secs. Practiced standing x2. On 3rd stand pt took 3-4 steps to the chair. Pt tried sitting too soon needing MAX to get her back to the chair. Performed seated AROM BLE x15 w/ increased time and cues for technique. Pt able to sit up in the chair w/ BUE support. Completed BUE reaching across midline and outside MARLON. Will cont POC.

## 2023-05-16 NOTE — DISCHARGE SUMMARY
[facility] Medicine Services  DISCHARGE SUMMARY       Date of Admission: 5/5/2023  Date of Discharge:  5/16/2023  Primary Care Physician: David Frazier MD    Presenting Problem/Chief Complaint:  Low blood pressure.     Final Discharge Diagnoses:  Active Hospital Problems    Diagnosis    • **Hemorrhagic shock    • Acute gastric ulcer with hemorrhage    • Gastrointestinal hemorrhage with melena    • Acute blood loss anemia    • Right lower lobe pneumonia    • Hypoxia    • Thrombocytopenia    • Metabolic encephalopathy    • Intellectual disability    • Transaminitis      Consults: GI.     Procedures Performed: EGD x 2 described below.     Pertinent Test Results:   Imaging Results (All)     Procedure Component Value Units Date/Time    US Liver [[id number]] Collected: 05/12/23 1009     Updated: 05/12/23 1019    Narrative:      EXAM: US LIVER-      DATE: 5/12/2023 9:09 AM CDT     HISTORY: elevated liver enzymes. Reported prior pancreatic head cyst  measuring 5 mm on outside CT, which is not available at time of  dictation.     COMPARISON: No existing relevant imaging studies available     TECHNIQUE: Real-time ultrasound performed with representative images  saved to PACS.     FINDINGS:  PANCREAS. Partially visualized pancreas within normal limits. Main  pancreatic duct measures 3 mm proximally, within normal limits.     AORTA. Visualized proximal abdominal aorta appears grossly within normal  limits, distal obscured by bowel gas.     IVC. Partially visualized IVC within normal limits.     LIVER. Normal liver echogenicity and contour. Patent portal vein with  normal hepatopedal flow.     BILE DUCTS AND GALLBLADDER. No gallstones or sludge demonstrated. No  pericholecystic fluid. Limited assessment of gallbladder wall due to  contraction. Common bile duct measures 0.7 cm, within normal limits for  age.     RIGHT KIDNEY. Measures 9.2 x 3.8 x 4.4 cm. No hydronephrosis. There is a  1  cm simple appearing cyst with small dependent echogenicity with  twinkle artifact, which could represent nephrolithiasis or layering milk  of calcium.            Impression:      1. No acute abdominal finding by ultrasound.  2. RIGHT kidney with small echogenicity, could represent nephrolithiasis  or layering milk of calcium in a 1 cm otherwise simple appearing cyst.  No hydronephrosis demonstrated.     This report was finalized on 05/12/2023 10:16 by Dr Lelo Seaman MD.    XR Chest 1 View [[id number]] Collected: 05/05/23 1959     Updated: 05/05/23 2003    Narrative:      EXAM/TECHNIQUE: XR CHEST 1 VW-     INDICATION: Line placement, pneumonia.     COMPARISON: None     FINDINGS:     Right-sided CVL with tip overlying the cavoatrial junction. No visible  pneumothorax. Hazy veiling RIGHT basilar opacity. LEFT lung and pleural  space are clear. Cardiac silhouette is normal size. No acute osseous  finding.       Impression:         1.  Right-sided CVL with tip overlying the low SVC. No pneumothorax.  2.  Veiling RIGHT basilar opacity, likely representing small layering  pleural effusion and/or atelectasis.  This report was finalized on 05/05/2023 20:00 by Dr. Miguel Angel Grewal MD.        LAB RESULTS:      Lab 05/15/23  0420 05/14/23  0348 05/13/23  0618 05/12/23  1225 05/11/23  0505 05/09/23  0845   WBC 10.01 9.15 8.84 9.66 9.44 7.43   HEMOGLOBIN 8.0* 7.5* 7.9* 7.9* 8.3* 9.0*   HEMATOCRIT 24.9* 23.4* 26.3* 25.1* 25.6* 27.2*   PLATELETS 196 160 130* 113* 83* 72*   NEUTROS ABS  --   --   --   --   --  5.79   IMMATURE GRANS (ABS)  --   --   --   --   --  0.04   LYMPHS ABS  --   --   --   --   --  0.80   MONOS ABS  --   --   --   --   --  0.56   EOS ABS  --   --   --   --   --  0.22   MCV 89.2 91.8 97.0 91.3 89.2 88.0         Lab 05/16/23  0531 05/15/23  0420 05/14/23  0348 05/13/23  0618 05/12/23  1225   SODIUM 138 139 140 141 144   POTASSIUM 3.7 3.5 3.7 3.8 3.7   CHLORIDE 103 102 104 107 109*   CO2 30.0* 29.0 29.0 27.0  30.0*   ANION GAP 5.0 8.0 7.0 7.0 5.0   BUN 9 9 8 9 8   CREATININE 0.40* 0.41* 0.36* 0.45* 0.49*   EGFR 104.7 104.0 107.3 101.7 99.7   GLUCOSE 99 106* 88 99 90   CALCIUM 8.7 8.7 8.5* 8.5* 8.8         Lab 05/12/23  1225   TOTAL PROTEIN 5.2*   ALBUMIN 2.7*   GLOBULIN 2.5   ALT (SGPT) 23   AST (SGOT) 16   BILIRUBIN 0.3   ALK PHOS 71                     Brief Urine Lab Results  (Last result in the past 365 days)      Color   Clarity   Blood   Leuk Est   Nitrite   Protein   CREAT   Urine HCG        05/06/23 1000 Yellow   Clear   Small (1+)   Negative   Negative   Negative               Microbiology Results (last 10 days)     ** No results found for the last 240 hours. **        Hospital Course:   The patient was admitted to my service here at [facility] on 5/5.   She has subsequently been cared for by Dr. Hazel since that point in time. She was transferred here from [facility] in [facility]. Her primary care provider had her in the hospital there for 2 days prior to transfer.  She had initially presented with hypothermia and altered mental status.  He thought at first she was just dehydrated and then she ultimately developed a right lower lobe pneumonia and some hypoxemia after initially having clear chest imaging and a normal urinalysis.  She became hypotensive.  A central line was placed and she was started on norepinephrine and sent here.     Lactate and procalcitonin were normal.  MRSA nasal screen was negative.  Streptococcal/Legionella urinary antigens were negative.  Unable to produce sputum.  She was treated initially with vancomycin and Zosyn.  Efforts were made at pulmonary toilet.  She has received inhaled bronchodilators.  She has been weaned from oxygen to room air at this point.     She ended up having melena and her hemoglobin came back at 6.7 when first checked here.  She also had a BUN of 56.  Concerns were for an upper GI bleed.  She was started on pantoprazole.  GI was  "consulted.  She was taken on 5/6 for EGD by Dr. Villegas.  She had gastric ulcers with adherent clot.  These were clipped and injected with epinephrine.  She was taken back on 5/9 and had more clips placed as one of the ulcers had ultimately revealed an underlying visible vessel.  She received a total of 3 units of packed red blood cells.  She has not required blood since 5/6.  Hemoglobin 8.0 (7.5) yesterday.     Speech therapy cleared for puréed foods with thin liquids.       She had a mild transaminitis at transfer, which resolved.       She was mildly thrombocytopenic at transfer and this was likely secondary to consumption from her bleeding.  This has resolved.       SCDs were used  for DVT prophylaxis.    She has an SNF bed today.     Physical Exam on Discharge:  /65 (BP Location: Left arm, Patient Position: Lying)   Pulse 79   Temp 97.9 °F (36.6 °C) (Oral)   Resp 18   Ht 154.9 cm (61\")   Wt 59.1 kg (130 lb 6.4 oz)   SpO2 94%   BMI 24.64 kg/m²   Physical Exam  Constitutional:       Comments: Up in bed.    HENT:      Head: Normocephalic and atraumatic.      Mouth/Throat:      Mouth: Mucous membranes are dry.   Eyes:      Conjunctiva/sclera: Conjunctivae normal.      Pupils: Pupils are equal, round, and reactive to light.   Neck:      Vascular: No JVD.   Cardiovascular: NSR.      Rate and Rhythm: Normal rate and regular rhythm.      Heart sounds: Normal heart sounds.   Pulmonary:      Breath sounds: No rales or rhonchi present. No wheezing.      Comments: On room air.   Chest:      Chest wall: No tenderness.   Abdominal:      General: Bowel sounds are normal. There is no distension.      Palpations: Abdomen is soft.      Tenderness: There is no abdominal tenderness.   Musculoskeletal: Right IJ CVC removed yesterday.        General: No tenderness or deformity.      Cervical back: Neck supple.   Skin:     General: Skin is warm and dry.      Findings: No rash.   Neurological:      Mental Status: She is " disoriented.      Motor: Weakness present. No abnormal muscle tone.      Comments: Drowsy and weak, which is her baseline.     Condition on Discharge: Stable for SNF care.     Discharge Disposition:  Skilled Nursing Facility (DC - External): [facility] in [place], [place]    Discharge Medications:     Discharge Medications      New Medications      Instructions Start Date   pantoprazole 40 MG EC tablet  Commonly known as: PROTONIX   40 mg, Oral, 2 Times Daily Before Meals         Changes to Medications      Instructions Start Date   risperiDONE 1 MG tablet  Commonly known as: risperDAL  What changed: Another medication with the same name was removed. Continue taking this medication, and follow the directions you see here.   1 mg, Oral, 2 Times Daily         Continue These Medications      Instructions Start Date   acetaminophen 325 MG tablet  Commonly known as: TYLENOL   650 mg, Oral, Every 6 Hours PRN      albuterol (2.5 MG/3ML) 0.083% nebulizer solution  Commonly known as: PROVENTIL   2.5 mg, Nebulization, Every 4 Hours PRN      amLODIPine 5 MG tablet  Commonly known as: NORVASC   5 mg, Oral, Daily      aspirin 81 MG chewable tablet   81 mg, Oral, Daily      atorvastatin 10 MG tablet  Commonly known as: LIPITOR   10 mg, Oral, Nightly      Calcium Carbonate-Vitamin D 600-10 MG-MCG per tablet   1 tablet, Oral, 2 Times Daily      carvedilol 12.5 MG tablet  Commonly known as: COREG   12.5 mg, Oral, 2 Times Daily With Meals      Loratadine 10 MG capsule   10 mg, Oral, Daily PRN      multivitamins-minerals capsule capsule   1 capsule, Oral, 2 Times Daily      sennosides-docusate 8.6-50 MG per tablet  Commonly known as: PERICOLACE   2 tablets, Oral, Nightly      traZODone 50 MG tablet  Commonly known as: DESYREL   25 mg, Oral, Nightly         Stop These Medications    potassium chloride 20 MEQ CR tablet  Commonly known as: K-DUR,KLOR-CON     triamterene-hydrochlorothiazide 37.5-25 MG per capsule  Commonly known as:  DYAZIDE          Discharge Diet:   Diet Instructions     Diet: Regular/House Diet; Pureed (NDD 1); Thin (IDDSI 0)      Discharge Diet: Regular/House Diet    Texture: Pureed (NDD 1)    Fluid Consistency: Thin (IDDSI 0)        Activity at Discharge:   Activity Instructions     Activity as Tolerated          Follow-up Appointments:   PCP or SNF physician in 1 week.     Test Results Pending at Discharge: None.     Electronically signed by David Laird DO, 05/16/23, 07:17 CDT.    Time: 35 minutes.

## 2023-05-16 NOTE — CASE MANAGEMENT/SOCIAL WORK
Continued Stay Note  [facility]     Patient Name: Jess Ramsey  MRN: [id number]  Today's Date: 5/16/2023    Admit Date: 5/5/2023    Plan: [facility] [place]   Discharge Plan       Row Name 05/16/23 0812       Plan    Plan [facility]    Final Discharge Disposition Code 03 - skilled nursing facility (SNF)    Final Note Patient is discharged today to [facility] [place]. KYLIE called facility and notified AKIRA James. KYLIE faxed discharge summary to [phone]. Patient's nurse will call report to [phone].                    Expected Discharge Date and Time       Expected Discharge Date Expected Discharge Time    May 16, 2023               EMMANUELLE Molina

## 2023-05-16 NOTE — THERAPY DISCHARGE NOTE
Acute Care - Physical Therapy Discharge Summary  [facility]       Patient Name: Jess Ramsey  : 1950  MRN: [id number]    Today's Date: 2023                 Admit Date: 2023      PT Recommendation and Plan    Visit Dx:    ICD-10-CM ICD-9-CM   1. Acute blood loss anemia  D62 285.1   2. Hypovolemic shock  R57.1 785.59   3. Oral phase dysphagia  R13.11 787.21   4. Acute gastric ulcer with hemorrhage  K25.0 531.00   5. Impaired mobility and ADLs  Z74.09 V49.89    Z78.9    6. Impaired mobility  Z74.09 799.89        Outcome Measures       Row Name 23 0810 05/15/23 0835          How much help from another person do you currently need...    Turning from your back to your side while in flat bed without using bedrails? 2  -TB 2  -TB     Moving from lying on back to sitting on the side of a flat bed without bedrails? 2  -TB 2  -TB     Moving to and from a bed to a chair (including a wheelchair)? 2  -TB 2  -TB     Standing up from a chair using your arms (e.g., wheelchair, bedside chair)? 2  -TB 2  -TB     Climbing 3-5 steps with a railing? 1  -TB 1  -TB     To walk in hospital room? 1  -TB 1  -TB     AM-PAC 6 Clicks Score (PT) 10  -TB 10  -TB        Functional Assessment    Outcome Measure Options AM-PAC 6 Clicks Basic Mobility (PT)  -TB AM-PAC 6 Clicks Basic Mobility (PT)  -TB               User Key  (r) = Recorded By, (t) = Taken By, (c) = Cosigned By      Initials Name Provider Type    TB Toni Hoffman PTA Physical Therapist Assistant                     PT Charges       Row Name 23 1122             Time Calculation    Start Time 0810  -TB      Stop Time 0849  -TB      Time Calculation (min) 39 min  -TB      PT Received On 23  -TB         Time Calculation- PT    Total Timed Code Minutes- PT 39 minute(s)  -TB                User Key  (r) = Recorded By, (t) = Taken By, (c) = Cosigned By      Initials Name Provider Type    Toni Narayan PTA Physical Therapist Assistant                      PT Rehab Goals       Row Name 05/16/23 1617             Bed Mobility Goal 1 (PT)    Activity/Assistive Device (Bed Mobility Goal 1, PT) sit to supine;supine to sit;rolling to right;rolling to left  -KJ      [place] Level/Cues Needed (Bed Mobility Goal 1, PT) moderate assist (50-74% patient effort)  -KJ      Time Frame (Bed Mobility Goal 1, PT) long term goal (LTG)  -KJ      Progress/Outcomes (Bed Mobility Goal 1, PT) goal met  -KJ         Transfer Goal 1 (PT)    Activity/Assistive Device (Transfer Goal 1, PT) bed-to-chair/chair-to-bed;wheelchair transfer  -KJ      [place] Level/Cues Needed (Transfer Goal 1, PT) moderate assist (50-74% patient effort)  -KJ      Time Frame (Transfer Goal 1, PT) long term goal (LTG)  -KJ      Progress/Outcome (Transfer Goal 1, PT) goal met  -KJ                User Key  (r) = Recorded By, (t) = Taken By, (c) = Cosigned By      Initials Name Provider Type Discipline    Jessica Manzanares PTA Physical Therapist Assistant PT                               Jessica Hess PTA   5/16/2023

## 2023-05-16 NOTE — THERAPY DISCHARGE NOTE
Acute Care - Occupational Therapy Discharge Summary  [facility]     Patient Name: Jess Ramsey  : 1950  MRN: [id number]    Today's Date: 2023                 Admit Date: 2023        OT Recommendation and Plan    Visit Dx:    ICD-10-CM ICD-9-CM   1. Acute blood loss anemia  D62 285.1   2. Hypovolemic shock  R57.1 785.59   3. Oral phase dysphagia  R13.11 787.21   4. Acute gastric ulcer with hemorrhage  K25.0 531.00   5. Impaired mobility and ADLs  Z74.09 V49.89    Z78.9    6. Impaired mobility  Z74.09 799.89                OT Rehab Goals       Row Name 23 1500             Grooming Goal 1 (OT)    Activity/Device (Grooming Goal 1, OT) wash face, hands  -LS      [place] (Grooming Goal 1, OT) maximum assist (25-49% patient effort)  -LS      Time Frame (Grooming Goal 1, OT) long term goal (LTG);by discharge  -LS      Progress/Outcome (Grooming Goal 1, OT) goal not met  -LS         Self-Feeding Goal 1 (OT)    Activity/Device (Self-Feeding Goal 1, OT) self-feeding skills, all  -LS      [place] Level/Cues Needed (Self-Feeding Goal 1, OT) minimum assist (75% or more patient effort)  -LS      Time Frame (Self-Feeding Goal 1, OT) long term goal (LTG);by discharge  -LS      Progress/Outcomes (Self-Feeding Goal 1, OT) goal not met  -LS                User Key  (r) = Recorded By, (t) = Taken By, (c) = Cosigned By      Initials Name Provider Type Discipline    LS Ana M Velarde COTA Occupational Therapist Assistant THERAPIES                     Outcome Measures       Row Name 23 0810 05/15/23 0835          How much help from another person do you currently need...    Turning from your back to your side while in flat bed without using bedrails? 2  -TB 2  -TB     Moving from lying on back to sitting on the side of a flat bed without bedrails? 2  -TB 2  -TB     Moving to and from a bed to a chair (including a wheelchair)? 2  -TB 2  -TB     Standing up from a chair using your arms (e.g.,  wheelchair, bedside chair)? 2  -TB 2  -TB     Climbing 3-5 steps with a railing? 1  -TB 1  -TB     To walk in hospital room? 1  -TB 1  -TB     AM-PAC 6 Clicks Score (PT) 10  -TB 10  -TB        Functional Assessment    Outcome Measure Options AM-PAC 6 Clicks Basic Mobility (PT)  -TB AM-PAC 6 Clicks Basic Mobility (PT)  -TB               User Key  (r) = Recorded By, (t) = Taken By, (c) = Cosigned By      Initials Name Provider Type    TB Toni Hoffman, PTA Physical Therapist Assistant                            OT Discharge Summary  Anticipated Discharge Disposition (OT): skilled nursing facility  Reason for Discharge: Discharge from facility  Outcomes Achieved: Refer to plan of care for updates on goals achieved  Discharge Destination: SNF      YARED Anna  5/16/2023

## 2023-05-19 ENCOUNTER — TELEPHONE (OUTPATIENT)
Dept: GASTROENTEROLOGY | Facility: CLINIC | Age: 73
End: 2023-05-19

## 2023-05-19 NOTE — TELEPHONE ENCOUNTER
----- Message from Sherif Villegas MD sent at 5/9/2023 12:31 PM CDT -----  Regarding: Follow-up EGD in 4 to 6 weeks  Hi Kerrie.  This patient had a significant bleed from a gastric ulcer.  I ended up clipping the blood vessel on the second endoscopy.  She is H. pylori negative.    I recommended pantoprazole 40 mg twice daily until her follow-up endoscopy which should be in 4 to 6 weeks.  She is living in an assisted living facility and is essentially bedridden.  If we can do everything remotely that would be great.  Please let me know if there is anything else you need.    J

## 2023-05-24 NOTE — PROGRESS NOTES
Enter Query Response Below      Query Response:       Metabolic Encephalopathy related to Sepsis        Electronically signed by David Laird DO, 23, 6:40 AM CDT.]         If applicable, please update the problem list.     Patient: Jess Ramsey        : 1950  Account: [id number]           Admit Date: 2023        How to Respond to this query:       a. Click New Note     b. Answer query within the yellow box.                c. Update the Problem List, if applicable.      If you have any questions about this query contact me at: [email or web address]     ,     71 y/o noted with Acute Gastric Ulcer with hemorrhage, Acute Blood Loss Anemia, right lower lobe Pneumonia, Thrombocytopenia, Metabolic Encephalopathy and Intellectual Disability per the Discharge Summary Final Discharge Diagnosis. Some progress notes noted Septic Shock. Septic Shock ruled out by query response. On admit: Lactate 1.8, Procalcitonin 0.27, Platelets 80 and patient disoriented/fairly somnolent on physical exam per the H&P. Nursing documented a patient response to pain only and GCS 7 on admit. Progress note  noted patient a snow of the state, behavior wise baseline aggressive and yelling that has been better with change in psych meds. The discharge summary noted thrombocytopenic at transfer was likely secondary to consumption from bleeding. At discharge nursing noted patient alert and GCS score 12. Blood cultures with no growth at five days. Patient treated with Levophed infusion, Protonix, Zosyn, GI Consult, EGD with gastric ulcer clipped and infected.     After study, can the patient's condition be clarified as:     >>Metabolic Encephalopathy related to Sepsis  >>Metabolic Encephalopathy only, NO Sepsis   >>Other (please specify):__________  >>Unable to determine    By submitting this query, we are merely seeking further clarification of documentation to accurately reflect all conditions that you are  monitoring, evaluating, treating or that extend the hospitalization or utilize additional resources of care. Please utilize your independent clinical judgment when addressing the question(s) above.     This query and your response, once completed, will be entered into the legal medical record.    Sincerely,  Estrella España RN CCDS   Clinical Documentation [facility]

## 2023-05-24 NOTE — PROGRESS NOTES
Enter Query Response Below      Query Response:     Ruled out    I feel it was more related to hemorrhagic shock.     Electronically signed by David Laird, , 23, 7:02 PM CDT.           If applicable, please update the problem list.     Patient: Jess Ramsey        : 1950  Account: [id number]           Admit Date: 2023        How to Respond to this query:       a. Click New Note     b. Answer query within the yellow box.                c. Update the Problem List, if applicable.      If you have any questions about this query contact me at: [email or web address]     ,     73 y/o noted with Acute Gastric Ulcer with hemorrhage, Acute Blood Loss Anemia, right lower lobe Pneumonia, Thrombocytopenia, Metabolic Encephalopathy and Intellectual Disability per the Discharge Summary Final Discharge Diagnosis. Progress note 05/15 noted patient was transfer from [facility], at presentation the main thought was that she was experiencing Septic Shock related to right lower lobe Pneumonia, however patient after presenting had a large melanotic stool and had hemoglobin less than 7, was felt to have hemorrhage shock. On admit: Lactate 1.8, Procalcitonin 0.27, Platelets 80 and blood pressure 77/43 at 18:45. Blood cultures with no growth at five days. Patient treated with Levophed infusion, Protonix, Zosyn, GI Consult, EGD with gastric ulcer clipped and infected.     After study, can the Septic Shock be clarified as:     >>Ruled out  >>Ruled in   >>Other (please specify):__________  >>Unable to determine    By submitting this query, we are merely seeking further clarification of documentation to accurately reflect all conditions that you are monitoring, evaluating, treating or that extend the hospitalization or utilize additional resources of care. Please utilize your independent clinical judgment when addressing the question(s) above.     This query and your response, once completed,  will be entered into the legal medical record.    Sincerely,  Estrella España RN CCDS   Clinical Documentation [facility]

## 2023-05-26 NOTE — TELEPHONE ENCOUNTER
Sherif Villegas MD Martin, Kerrie Sy.  This patient had a significant bleed from a gastric ulcer.  I ended up clipping the blood vessel on the second endoscopy.  She is H. pylori negative.    I recommended pantoprazole 40 mg twice daily until her follow-up endoscopy which should be in 4 to 6 weeks.  She is living in an assisted living facility and is essentially bedridden.  If we can do everything remotely that would be great.  Please let me know if there is anything else you need.    J

## 2025-04-21 NOTE — PROGRESS NOTES
[facility]  SPEECH THERAPY  [] SPEECH LANGUAGE COGNITIVE EVALUATION  [x] DAILY NOTE   [] PROGRESS NOTE  [] DISCHARGE NOTE    [x] [facility] [place]   [] [facility] CARE [place]    [] [facility]   [] [facility]    Date: 2025  Patient Name:  Yulissa Andersen  : 1978  MRN: [id number]  CSN: [id number]    Referring Practitioner Noa Leal DO [id number]      Diagnosis  Diagnoses       I63.9 (ICD-10-CM) - Cerebral infarction, unspecified           Treatment Diagnosis R41.89 Other symptoms and signs involving cognitive functions and awareness  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  she has a past surgical history that includes cyst removal; hysteroscopy; Hysterectomy (2013); Breast surgery (Left, 02/10/2015); pr exc b9 lesion mrgn xcp sk tg t/a/l 2.1-3.0 cm (N/A, 2018); DISSECTION GROIN (N/A, 2019); and Cardiac catheterization.     Allergies Allergies   Allergen Reactions    Latex Hives    Ace Inhibitors Angioedema    Bactrim [Sulfamethoxazole-Trimethoprim] Other (See Comments)     Caused acute allergic interstitial nephritis and acute kidney injury in 2015.  Marcelo Gunderson,     Penicillins Hives    Clindamycin/Lincomycin Rash    Ciprofloxacin Hives    Doxycycline Hives    Lisinopril      Attacked kidneys      Medications   Current Outpatient Medications:     apixaban (ELIQUIS) 5 MG TABS tablet, Take 1 tablet by mouth 2 times daily, Disp: 60 tablet, Rfl: 1    aspirin 81 MG chewable tablet, Take 1  Laine Olsen MD Progress Note     LOS: 5 days   Patient Care Team:  David Frazier MD as PCP - General (Family Medicine)        Subjective     No active bleeding noted.    Objective     Vital Signs  Temp:  [97.5 °F (36.4 °C)-98.8 °F (37.1 °C)] 98.6 °F (37 °C)  Heart Rate:  [] 98  Resp:  [14-20] 20  BP: (102-144)/(53-99) 117/76    Intake & Output (last 3 days)       05/07 0701  05/08 0700 05/08 0701 05/09 0700 05/09 0701  05/10 0700 05/10 0701  05/11 0700    P.O.   240     I.V. (mL/kg) 1858 (31.2) 485.6 (8.1) 2060 (34.6)     Blood        IV Piggyback 406 115      Total Intake(mL/kg) 2264 (38) 600.6 (10.1) 2300 (38.6)     Urine (mL/kg/hr) 830 (0.6) 900 (0.6) 2350 (1.6)     Stool 0 0      Total Output      Net +1434 -299.4 -50             Stool Unmeasured Occurrence 3 x 4 x            Physical Exam:     General Appearance:   Noncommunicable   Lungs:     respirations regular, even and unlabored    Heart:    Regular rhythm and normal rate, normal S1 and S2, no            murmur, no gallop, no rub   Chest Wall:    No abnormalities observed   Abdomen:      Soft   Extremities: No edema,    Results Review:     I reviewed the patient's new clinical results.    Lab Results (last 72 hours)     Procedure Component Value Units Date/Time    Blood Culture With SUSHIL - Blood, Arm, Left [[id number]]  (Normal) Collected: 05/05/23 1959    Specimen: Blood from Arm, Left Updated: 05/09/23 2015     Blood Culture No growth at 4 days    Basic Metabolic Panel [[id number]]  (Abnormal) Collected: 05/09/23 1049    Specimen: Blood Updated: 05/09/23 1129     Glucose 117 mg/dL      BUN 6 mg/dL      Creatinine 0.35 mg/dL      Sodium 139 mmol/L      Potassium 3.4 mmol/L      Chloride 107 mmol/L      CO2 24.0 mmol/L      Calcium 8.1 mg/dL      BUN/Creatinine Ratio 17.1     Anion Gap 8.0 mmol/L      eGFR 108.1 mL/min/1.73     Narrative:      GFR Normal >60  Chronic Kidney Disease <60  Kidney Failure <15    The GFR formula is only  valid for adults with stable renal function between ages 18 and 70.    CBC & Differential [[id number]]  (Abnormal) Collected: 05/09/23 0845    Specimen: Blood Updated: 05/09/23 0911    Narrative:      The following orders were created for panel order CBC & Differential.  Procedure                               Abnormality         Status                     ---------                               -----------         ------                     CBC Auto Differential[[id number]]        Abnormal            Final result                 Please view results for these tests on the individual orders.    CBC Auto Differential [[id number]]  (Abnormal) Collected: 05/09/23 0845    Specimen: Blood Updated: 05/09/23 0911     WBC 7.43 10*3/mm3      RBC 3.09 10*6/mm3      Hemoglobin 9.0 g/dL      Hematocrit 27.2 %      MCV 88.0 fL      MCH 29.1 pg      MCHC 33.1 g/dL      RDW 14.7 %      RDW-SD 46.5 fl      MPV 12.7 fL      Platelets 72 10*3/mm3      Neutrophil % 77.9 %      Lymphocyte % 10.8 %      Monocyte % 7.5 %      Eosinophil % 3.0 %      Basophil % 0.3 %      Immature Grans % 0.5 %      Neutrophils, Absolute 5.79 10*3/mm3      Lymphocytes, Absolute 0.80 10*3/mm3      Monocytes, Absolute 0.56 10*3/mm3      Eosinophils, Absolute 0.22 10*3/mm3      Basophils, Absolute 0.02 10*3/mm3      Immature Grans, Absolute 0.04 10*3/mm3      nRBC 0.8 /100 WBC     H. Pylori Antibody, IgG [[id number]] Collected: 05/07/23 1833    Specimen: Blood Updated: 05/09/23 0719     H. pylori IgG 0.22 Index Value      Comment:                              Negative           <0.80                               Equivocal    0.80 - 0.89                               Positive           >0.89       Narrative:      Performed at:  [address] [place] [address]  [id number]  : Lobo Gomez PhD, Phone:  [phone]    Comprehensive Metabolic Panel [[id number]]  (Abnormal) Collected: 05/08/23 0450    Specimen: Blood Updated: 05/08/23  0619     Glucose 87 mg/dL      BUN 18 mg/dL      Creatinine 0.45 mg/dL      Sodium 150 mmol/L      Potassium 3.3 mmol/L      Comment: Slight hemolysis detected by analyzer. Results may be affected.        Chloride 117 mmol/L      CO2 26.0 mmol/L      Calcium 8.5 mg/dL      Total Protein 4.8 g/dL      Albumin 2.6 g/dL      ALT (SGPT) 48 U/L      AST (SGOT) 40 U/L      Comment: Slight hemolysis detected by analyzer. Results may be affected.        Alkaline Phosphatase 60 U/L      Total Bilirubin 0.6 mg/dL      Globulin 2.2 gm/dL      A/G Ratio 1.2 g/dL      BUN/Creatinine Ratio 40.0     Anion Gap 7.0 mmol/L      eGFR 101.7 mL/min/1.73     Narrative:      GFR Normal >60  Chronic Kidney Disease <60  Kidney Failure <15    The GFR formula is only valid for adults with stable renal function between ages 18 and 70.    CBC & Differential [[id number]]  (Abnormal) Collected: 05/08/23 0401    Specimen: Blood Updated: 05/08/23 0427    Narrative:      The following orders were created for panel order CBC & Differential.  Procedure                               Abnormality         Status                     ---------                               -----------         ------                     CBC Auto Differential[[id number]]        Abnormal            Final result                 Please view results for these tests on the individual orders.    CBC Auto Differential [[id number]]  (Abnormal) Collected: 05/08/23 0401    Specimen: Blood Updated: 05/08/23 0427     WBC 7.40 10*3/mm3      RBC 3.28 10*6/mm3      Hemoglobin 9.5 g/dL      Hematocrit 28.4 %      MCV 86.6 fL      MCH 29.0 pg      MCHC 33.5 g/dL      RDW 14.8 %      RDW-SD 46.7 fl      MPV 11.9 fL      Platelets 77 10*3/mm3      Neutrophil % 79.8 %      Lymphocyte % 10.7 %      Monocyte % 6.4 %      Eosinophil % 2.3 %      Basophil % 0.3 %      Neutrophils, Absolute 5.91 10*3/mm3      Lymphocytes, Absolute 0.79 10*3/mm3      Monocytes, Absolute 0.47 10*3/mm3      Eosinophils,  Absolute 0.17 10*3/mm3      Basophils, Absolute 0.02 10*3/mm3     Hemoglobin & Hematocrit, Blood [[id number]]  (Abnormal) Collected: 05/07/23 1547    Specimen: Blood Updated: 05/07/23 1602     Hemoglobin 9.7 g/dL      Hematocrit 28.7 %     Magnesium [[id number]]  (Normal) Collected: 05/07/23 0537    Specimen: Blood Updated: 05/07/23 1254     Magnesium 1.7 mg/dL     Protime-INR [[id number]]  (Normal) Collected: 05/07/23 1156    Specimen: Blood Updated: 05/07/23 1214     Protime 14.2 Seconds      INR 1.09    Hemoglobin & Hematocrit, Blood [[id number]]  (Abnormal) Collected: 05/07/23 1156    Specimen: Blood Updated: 05/07/23 1206     Hemoglobin 9.7 g/dL      Hematocrit 28.0 %         Imaging Results (Last 72 Hours)     ** No results found for the last 72 hours. **              Assessment & Plan       Septic shock    Right lower lobe pneumonia    Intellectual disability    Anemia    Thrombocytopenia    Transaminitis    Hypoxia    Metabolic encephalopathy    Acute blood loss anemia    Hypovolemic shock      H&H is drifting down which is.unexpected.  No active bleeding noted.  I will be out of town through the weekend.  We will sign off for now but will call my coverage if any questions or problems arise.  Again, I spoke with Dr. Mora earlier and he would be willing to attempt coiling of any bleeding vessels if unable to be controlled endoscopically      Laine Olsen MD  05/10/23  07:59 CDT

## (undated) DEVICE — NDL SCLEROTHRPY INTERJECT 25G 4 240 CLR

## (undated) DEVICE — YANKAUER,BULB TIP WITH VENT: Brand: ARGYLE

## (undated) DEVICE — SINGLE-USE POLYPECTOMY SNARE: Brand: CAPTIVATOR II

## (undated) DEVICE — THE CHANNEL CLEANING BRUSH IS A NYLON FLEXI BRUSH ATTACHED TO A FLEXIBLE PLASTIC SHEATH DESIGNED TO SAFELY REMOVE DEBRIS FROM FLEXIBLE ENDOSCOPES.

## (undated) DEVICE — Device: Brand: BLACK EYE

## (undated) DEVICE — CONMED SCOPE SAVER BITE BLOCK, 20X27 MM: Brand: SCOPE SAVER

## (undated) DEVICE — Device: Brand: DEFENDO AIR/WATER/SUCTION AND BIOPSY VALVE

## (undated) DEVICE — CUFF,BP,DISP,1 TUBE,ADULT,HP: Brand: MEDLINE

## (undated) DEVICE — SENSR O2 OXIMAX FNGR A/ 18IN NONSTR